# Patient Record
Sex: FEMALE | Employment: OTHER | URBAN - METROPOLITAN AREA
[De-identification: names, ages, dates, MRNs, and addresses within clinical notes are randomized per-mention and may not be internally consistent; named-entity substitution may affect disease eponyms.]

---

## 2021-03-19 ENCOUNTER — OFFICE VISIT (OUTPATIENT)
Dept: FAMILY MEDICINE CLINIC | Age: 51
End: 2021-03-19
Payer: COMMERCIAL

## 2021-03-19 ENCOUNTER — HOSPITAL ENCOUNTER (OUTPATIENT)
Age: 51
Setting detail: SPECIMEN
Discharge: HOME | End: 2021-03-19
Payer: COMMERCIAL

## 2021-03-19 VITALS
WEIGHT: 248 LBS | BODY MASS INDEX: 39.86 KG/M2 | SYSTOLIC BLOOD PRESSURE: 100 MMHG | HEART RATE: 81 BPM | OXYGEN SATURATION: 98 % | TEMPERATURE: 97 F | HEIGHT: 66 IN | DIASTOLIC BLOOD PRESSURE: 70 MMHG

## 2021-03-19 DIAGNOSIS — R20.9 BILATERAL COLD FEET: ICD-10-CM

## 2021-03-19 DIAGNOSIS — Z00.00 WELL ADULT EXAM: Primary | ICD-10-CM

## 2021-03-19 DIAGNOSIS — Z76.0 MEDICATION REFILL: ICD-10-CM

## 2021-03-19 DIAGNOSIS — Z12.31 BREAST CANCER SCREENING BY MAMMOGRAM: ICD-10-CM

## 2021-03-19 DIAGNOSIS — Z12.11 COLON CANCER SCREENING: ICD-10-CM

## 2021-03-19 DIAGNOSIS — Z13.89 MULTIPHASIC SCREENING: ICD-10-CM

## 2021-03-19 DIAGNOSIS — E11.65 TYPE 2 DIABETES MELLITUS WITH HYPERGLYCEMIA, WITH LONG-TERM CURRENT USE OF INSULIN (HCC): ICD-10-CM

## 2021-03-19 DIAGNOSIS — E78.5 DYSLIPIDEMIA: ICD-10-CM

## 2021-03-19 DIAGNOSIS — Z79.4 TYPE 2 DIABETES MELLITUS WITH HYPERGLYCEMIA, WITH LONG-TERM CURRENT USE OF INSULIN (HCC): ICD-10-CM

## 2021-03-19 DIAGNOSIS — Z71.3 DIETARY COUNSELING: ICD-10-CM

## 2021-03-19 DIAGNOSIS — Z71.82 EXERCISE COUNSELING: ICD-10-CM

## 2021-03-19 DIAGNOSIS — I10 HTN, GOAL BELOW 130/80: ICD-10-CM

## 2021-03-19 PROBLEM — H59.021: Status: ACTIVE | Noted: 2019-07-31

## 2021-03-19 PROBLEM — J30.9 ALLERGIC RHINITIS: Status: ACTIVE | Noted: 2019-07-31

## 2021-03-19 PROBLEM — E11.42 POLYNEUROPATHY DUE TO TYPE 2 DIABETES MELLITUS (HCC): Status: ACTIVE | Noted: 2020-09-21

## 2021-03-19 PROBLEM — G50.0 TRIGEMINAL NEURALGIA: Status: ACTIVE | Noted: 2019-07-31

## 2021-03-19 PROBLEM — E11.9 DIABETES MELLITUS, TYPE 2 (HCC): Status: ACTIVE | Noted: 2019-07-31

## 2021-03-19 PROBLEM — E66.01 MORBID (SEVERE) OBESITY DUE TO EXCESS CALORIES (HCC): Status: ACTIVE | Noted: 2020-09-22

## 2021-03-19 PROBLEM — M50.30 DDD (DEGENERATIVE DISC DISEASE), CERVICAL: Status: ACTIVE | Noted: 2019-07-31

## 2021-03-19 PROBLEM — F32.9 MAJOR DEPRESSIVE DISORDER: Status: ACTIVE | Noted: 2020-09-22

## 2021-03-19 PROBLEM — F41.9 ANXIETY: Status: ACTIVE | Noted: 2019-07-31

## 2021-03-19 LAB
ABSOLUTE EOS #: 0.23 K/UL (ref 0–0.44)
ABSOLUTE IMMATURE GRANULOCYTE: 0.17 K/UL (ref 0–0.3)
ABSOLUTE LYMPH #: 3.05 K/UL (ref 1.1–3.7)
ABSOLUTE MONO #: 0.79 K/UL (ref 0.1–1.2)
ALBUMIN SERPL-MCNC: 4.4 G/DL (ref 3.5–5.2)
ALT SERPL-CCNC: 31 U/L (ref 5–33)
AMPHETAMINE SCREEN URINE: NEGATIVE
ANION GAP SERPL CALCULATED.3IONS-SCNC: 16 MMOL/L (ref 9–17)
AST SERPL-CCNC: 29 U/L
BARBITURATE SCREEN URINE: NEGATIVE
BASOPHILS # BLD: 1 % (ref 0–2)
BASOPHILS ABSOLUTE: 0.06 K/UL (ref 0–0.2)
BENZODIAZEPINE SCREEN, URINE: NEGATIVE
BUN BLDV-MCNC: 19 MG/DL (ref 6–20)
BUN/CREAT BLD: ABNORMAL (ref 9–20)
BUPRENORPHINE URINE: ABNORMAL
CALCIUM SERPL-MCNC: 9.3 MG/DL (ref 8.6–10.4)
CANNABINOID SCREEN URINE: POSITIVE
CHLORIDE BLD-SCNC: 101 MMOL/L (ref 98–107)
CHOLESTEROL/HDL RATIO: 7.1
CHOLESTEROL: 293 MG/DL
CO2: 22 MMOL/L (ref 20–31)
COCAINE METABOLITE, URINE: NEGATIVE
CREAT SERPL-MCNC: 0.66 MG/DL (ref 0.5–0.9)
CREATININE URINE: 112.9 MG/DL (ref 28–217)
DIFFERENTIAL TYPE: ABNORMAL
EOSINOPHILS RELATIVE PERCENT: 2 % (ref 1–4)
GFR AFRICAN AMERICAN: >60 ML/MIN
GFR NON-AFRICAN AMERICAN: >60 ML/MIN
GFR SERPL CREATININE-BSD FRML MDRD: ABNORMAL ML/MIN/{1.73_M2}
GFR SERPL CREATININE-BSD FRML MDRD: ABNORMAL ML/MIN/{1.73_M2}
GLUCOSE BLD-MCNC: 142 MG/DL (ref 70–99)
HCT VFR BLD CALC: 44.3 % (ref 36.3–47.1)
HDLC SERPL-MCNC: 41 MG/DL
HEMOGLOBIN: 13.8 G/DL (ref 11.9–15.1)
HEPATITIS C ANTIBODY: NONREACTIVE
HIV AG/AB: NONREACTIVE
IMMATURE GRANULOCYTES: 2 %
LDL CHOLESTEROL DIRECT: 197 MG/DL
LDL CHOLESTEROL: ABNORMAL MG/DL (ref 0–130)
LYMPHOCYTES # BLD: 28 % (ref 24–43)
MAGNESIUM: 2.2 MG/DL (ref 1.6–2.6)
MCH RBC QN AUTO: 29.9 PG (ref 25.2–33.5)
MCHC RBC AUTO-ENTMCNC: 31.2 G/DL (ref 28.4–34.8)
MCV RBC AUTO: 96.1 FL (ref 82.6–102.9)
MDMA URINE: ABNORMAL
METHADONE SCREEN, URINE: NEGATIVE
METHAMPHETAMINE, URINE: ABNORMAL
MICROALBUMIN/CREAT 24H UR: <12 MG/L
MICROALBUMIN/CREAT UR-RTO: NORMAL MCG/MG CREAT
MONOCYTES # BLD: 7 % (ref 3–12)
NRBC AUTOMATED: 0 PER 100 WBC
OPIATES, URINE: NEGATIVE
OXYCODONE SCREEN URINE: NEGATIVE
PDW BLD-RTO: 12.9 % (ref 11.8–14.4)
PHENCYCLIDINE, URINE: NEGATIVE
PHOSPHORUS: 3.1 MG/DL (ref 2.6–4.5)
PLATELET # BLD: 362 K/UL (ref 138–453)
PLATELET ESTIMATE: ABNORMAL
PMV BLD AUTO: 11.3 FL (ref 8.1–13.5)
POTASSIUM SERPL-SCNC: 4.5 MMOL/L (ref 3.7–5.3)
PROPOXYPHENE, URINE: ABNORMAL
RBC # BLD: 4.61 M/UL (ref 3.95–5.11)
RBC # BLD: ABNORMAL 10*6/UL
SEG NEUTROPHILS: 60 % (ref 36–65)
SEGMENTED NEUTROPHILS ABSOLUTE COUNT: 6.81 K/UL (ref 1.5–8.1)
SODIUM BLD-SCNC: 139 MMOL/L (ref 135–144)
TEST INFORMATION: ABNORMAL
TRICYCLIC ANTIDEPRESSANTS, UR: ABNORMAL
TRIGL SERPL-MCNC: 487 MG/DL
TSH SERPL DL<=0.05 MIU/L-ACNC: 1.18 MIU/L (ref 0.3–5)
VLDLC SERPL CALC-MCNC: ABNORMAL MG/DL (ref 1–30)
WBC # BLD: 11.1 K/UL (ref 3.5–11.3)
WBC # BLD: ABNORMAL 10*3/UL

## 2021-03-19 PROCEDURE — G8482 FLU IMMUNIZE ORDER/ADMIN: HCPCS | Performed by: FAMILY MEDICINE

## 2021-03-19 PROCEDURE — 99386 PREV VISIT NEW AGE 40-64: CPT | Performed by: FAMILY MEDICINE

## 2021-03-19 PROCEDURE — G8417 CALC BMI ABV UP PARAM F/U: HCPCS | Performed by: FAMILY MEDICINE

## 2021-03-19 PROCEDURE — 99204 OFFICE O/P NEW MOD 45 MIN: CPT | Performed by: FAMILY MEDICINE

## 2021-03-19 RX ORDER — LISINOPRIL 20 MG/1
20 TABLET ORAL DAILY
Qty: 90 TABLET | Refills: 0 | Status: SHIPPED | OUTPATIENT
Start: 2021-03-19 | End: 2021-07-02 | Stop reason: SDUPTHER

## 2021-03-19 RX ORDER — SEMAGLUTIDE 1.34 MG/ML
INJECTION, SOLUTION SUBCUTANEOUS
Qty: 2 PEN | Refills: 0 | COMMUNITY
Start: 2021-03-19 | End: 2021-07-15 | Stop reason: SDUPTHER

## 2021-03-19 RX ORDER — GABAPENTIN 300 MG/1
300 CAPSULE ORAL 3 TIMES DAILY
COMMUNITY
Start: 2020-08-31 | End: 2021-03-19 | Stop reason: SDUPTHER

## 2021-03-19 RX ORDER — GLIMEPIRIDE 2 MG/1
2 TABLET ORAL
COMMUNITY
Start: 2021-01-19 | End: 2021-03-19 | Stop reason: SDUPTHER

## 2021-03-19 RX ORDER — DICYCLOMINE HCL 20 MG
20 TABLET ORAL 4 TIMES DAILY
Qty: 360 TABLET | Refills: 0 | Status: SHIPPED | OUTPATIENT
Start: 2021-03-19 | End: 2022-05-16

## 2021-03-19 RX ORDER — DICYCLOMINE HCL 20 MG
20 TABLET ORAL 4 TIMES DAILY
COMMUNITY
Start: 2021-01-19 | End: 2021-03-19 | Stop reason: SDUPTHER

## 2021-03-19 RX ORDER — GABAPENTIN 300 MG/1
300 CAPSULE ORAL 3 TIMES DAILY
Qty: 270 CAPSULE | Refills: 0 | Status: SHIPPED | OUTPATIENT
Start: 2021-03-19 | End: 2021-09-01 | Stop reason: SDUPTHER

## 2021-03-19 RX ORDER — BUSPIRONE HYDROCHLORIDE 30 MG/1
30 TABLET ORAL 3 TIMES DAILY
COMMUNITY
Start: 2021-01-19 | End: 2021-03-19 | Stop reason: SDUPTHER

## 2021-03-19 RX ORDER — SIMVASTATIN 20 MG
20 TABLET ORAL NIGHTLY
Qty: 90 TABLET | Refills: 0 | Status: SHIPPED | OUTPATIENT
Start: 2021-03-19 | End: 2021-03-21

## 2021-03-19 RX ORDER — BUSPIRONE HYDROCHLORIDE 30 MG/1
30 TABLET ORAL 3 TIMES DAILY
Qty: 270 TABLET | Refills: 0 | Status: SHIPPED | OUTPATIENT
Start: 2021-03-19 | End: 2021-10-07

## 2021-03-19 RX ORDER — ASPIRIN 81 MG/1
81 TABLET, CHEWABLE ORAL DAILY
COMMUNITY

## 2021-03-19 RX ORDER — SIMVASTATIN 20 MG
20 TABLET ORAL
COMMUNITY
Start: 2020-05-04 | End: 2021-03-19 | Stop reason: SDUPTHER

## 2021-03-19 RX ORDER — LISINOPRIL 10 MG/1
20 TABLET ORAL 2 TIMES DAILY
COMMUNITY
Start: 2021-01-19 | End: 2021-03-19

## 2021-03-19 RX ORDER — INSULIN GLARGINE 100 [IU]/ML
30 INJECTION, SOLUTION SUBCUTANEOUS EVERY EVENING
COMMUNITY
Start: 2020-08-31 | End: 2021-09-23 | Stop reason: SDUPTHER

## 2021-03-19 RX ORDER — GLIMEPIRIDE 2 MG/1
2 TABLET ORAL
Qty: 90 TABLET | Refills: 0 | Status: SHIPPED | OUTPATIENT
Start: 2021-03-19 | End: 2021-07-02 | Stop reason: SDUPTHER

## 2021-03-19 SDOH — ECONOMIC STABILITY: TRANSPORTATION INSECURITY
IN THE PAST 12 MONTHS, HAS LACK OF TRANSPORTATION KEPT YOU FROM MEETINGS, WORK, OR FROM GETTING THINGS NEEDED FOR DAILY LIVING?: NO

## 2021-03-19 SDOH — ECONOMIC STABILITY: INCOME INSECURITY: HOW HARD IS IT FOR YOU TO PAY FOR THE VERY BASICS LIKE FOOD, HOUSING, MEDICAL CARE, AND HEATING?: NOT HARD AT ALL

## 2021-03-19 SDOH — HEALTH STABILITY: MENTAL HEALTH: HOW MANY STANDARD DRINKS CONTAINING ALCOHOL DO YOU HAVE ON A TYPICAL DAY?: NOT ASKED

## 2021-03-19 SDOH — HEALTH STABILITY: MENTAL HEALTH: HOW OFTEN DO YOU HAVE A DRINK CONTAINING ALCOHOL?: NEVER

## 2021-03-19 SDOH — ECONOMIC STABILITY: FOOD INSECURITY: WITHIN THE PAST 12 MONTHS, THE FOOD YOU BOUGHT JUST DIDN'T LAST AND YOU DIDN'T HAVE MONEY TO GET MORE.: NEVER TRUE

## 2021-03-19 ASSESSMENT — PATIENT HEALTH QUESTIONNAIRE - PHQ9
SUM OF ALL RESPONSES TO PHQ9 QUESTIONS 1 & 2: 2
1. LITTLE INTEREST OR PLEASURE IN DOING THINGS: 1
2. FEELING DOWN, DEPRESSED OR HOPELESS: 1

## 2021-03-19 NOTE — PROGRESS NOTES
Progress Note    Leticia Howe is a 45 y.o.  female who presents today alone for evaluation of   Chief Complaint   Patient presents with    [facility]     saw Yvette Naylor in 10/2020           HPI:   Patient is here to [facility] care today. Patient PMH DM type 2 IDDM, HTN, dyslipidemia, h/o DVT, DDD, OA, obesity, polyneuropathy 2/2 DM, GERD, mood disorder, and seasonal allergies. Patient [address] [place] GB removal and cataract removal. Patient fhx dad DM and mom COPD/PAD. Patient is a former tobacco smoker. Patient denies regular EtOH consumption. Patient denies illicits. Patient denies SIG E CAPS. Patient denies SI/HI. Patient denies anxiety. Patient admits to a low carb diet. Patient states she does walk and ride a stationary bike daily. Patient is due for mammogram. Patient is due for colon cancer screening. Patient states she had a PAP about 18 months ago and was told it was normal.    PHQ-9 Total Score: 2 (3/19/2021 10:11 AM)    Patient last HbA1c about 8.0 8/2020. Patient -250. Patient states she takes 30 units of meal time basalglar daily and 10 units of lispro with meals. Patient states she has gained about 40 lbs. Patient states this has been since 2019. Patient states she does have n/t in her feet. Patient denies polyuria/polyphagia/polydipsia. Patient states she does have exertional calf cramping. Patient states she does have cold feet frequently. Patient states she has been told she has a history of PAD. Patient denies cp/sob/le edema/dizziness/lightheadedness/blurry va/ha. Patient denies PND/orthopnea.     Health Maintenance Due   Topic Date Due    Potassium monitoring  Never done    Creatinine monitoring  Never done    Hepatitis C screen  Never done    Lipid screen  Never done    HIV screen  Never done    Cervical cancer screen  Never done    Diabetes screen  Never done    Breast cancer screen  Never done    Shingles Vaccine (1 of 2) Never done    Colon cancer screen colonoscopy Never done        Current Medications:     Current Outpatient Medications   Medication Sig Dispense Refill    aspirin 81 MG chewable tablet Take 81 mg by mouth daily      insulin glargine (BASAGLAR KWIKPEN) 100 UNIT/ML injection pen Inject 30 Units into the skin every evening      insulin lispro (HUMALOG) 100 UNIT/ML injection vial Inject 10 Units into the skin 3 times daily (before meals)      glimepiride (AMARYL) 2 MG tablet Take 1 tablet by mouth daily (with breakfast) 90 tablet 0    simvastatin (ZOCOR) 20 MG tablet Take 1 tablet by mouth nightly 90 tablet 0    lisinopril (PRINIVIL;ZESTRIL) 20 MG tablet Take 1 tablet by mouth daily 90 tablet 0    dicyclomine (BENTYL) 20 MG tablet Take 1 tablet by mouth 4 times daily 360 tablet 0    busPIRone (BUSPAR) 30 MG tablet Take 30 mg by mouth 3 times daily 270 tablet 0    gabapentin (NEURONTIN) 300 MG capsule Take 1 capsule by mouth 3 times daily for 90 days. 270 capsule 0    Semaglutide,0.25 or 0.5MG/DOS, (OZEMPIC, 0.25 OR 0.5 MG/DOSE,) 2 MG/1.5ML SOPN Inject 0.25 mg weekly 2 pen 0     No current facility-administered medications for this visit. Allergies: Allergies   Allergen Reactions    Latex Hives and Rash     After prolonged contact    After prolonged contact      Amoxicillin Hives    Atorvastatin Other (See Comments)     Stomach pain  Other reaction(s):  Other (See Comments), Other (See Comments)  Stomach pain  Stomach pain      Pineapple Itching        Medical History:     Past Medical History:   Diagnosis Date    Chronic deep vein thrombosis (DVT) of proximal vein of left lower extremity ([address]     DDD (degenerative disc disease), thoracolumbar     Dyslipidemia     Essential hypertension     Gastroesophageal reflux disease without esophagitis     Mood disorder (HCC)     Neuropathy     Trigeminal neuralgia     Type 2 diabetes mellitus with hyperglycemia, with long-term current use of insulin ([address]        Past Surgical History: Procedure Laterality Date    CATARACT REMOVAL WITH IMPLANT      CHOLECYSTECTOMY         Family History   Problem Relation Age of Onset    COPD Mother     Other Mother         PAD    Diabetes type 2  Father         Social History:     Social History     Socioeconomic History    Marital status:      Spouse name: Not on file    Number of children: Not on file    Years of education: Not on file    Highest education level: Not on file   Occupational History    Not on file   Social Needs    Financial resource strain: Not hard at all   Jose-Rosa insecurity     Worry: Never true     Inability: Never true   [employer] needs     Medical: No     Non-medical: No   Tobacco Use    Smoking status: Former Smoker     Quit date: 2019     Years since quittin.5    Smokeless tobacco: Never Used    Tobacco comment: estimated quit date   Substance and Sexual Activity    Alcohol use: Never     Frequency: Never     Binge frequency: Never    Drug use: Never    Sexual activity: Not on file   Lifestyle    Physical activity     Days per week: Not on file     Minutes per session: Not on file    Stress: Not on file   Relationships    Social connections     Talks on phone: Not on file     Gets together: Not on file     Attends Latter day service: Not on file     Active member of club or organization: Not on file     Attends meetings of clubs or organizations: Not on file     Relationship status: Not on file    Intimate partner violence     Fear of current or ex partner: Not on file     Emotionally abused: Not on file     Physically abused: Not on file     Forced sexual activity: Not on file   Other Topics Concern    Not on file   Social History Narrative    Not on file        ROS:     Constitutional: No fevers, chills, fatigue. ENT: No nasal congestion or sore throat  Respiratory: No difficulty in breathing or cough.    Cardiovascular: No chest pain, palpitations or shortness of breath  Gastrointestinal: No abdominal pain or change in bowel movements. Genitourinary: No change in urinary frequency or dysuria. Skin: No rashes or skin lesions. Neurological: No weakness. No headaches. MSK: +cold feet         Last Filed Vitals:  /70   Pulse 81   Temp 97 °F (36.1 °C) (Temporal)   Ht 5' 6\" (1.676 m)   Wt 248 lb (112.5 kg)   SpO2 98%   BMI 40.03 kg/m²      Physical Examination:     GENERAL APPEARANCE: in no acute distress, well developed, well nourished. HEAD: normocephalic, atraumatic. EYES: extraocular movement intact (EOMI), pupils equal, round, reactive to light and accommodation. EARS: normal, tympanic membrane intact, clear, auditory canal clear. NOSE: nares patent, no erythema, sinuses nontender bilaterally, no rhinorrhea. ORAL CAVITY: mucosa moist, no lesions. THROAT: clear, no mass, no exudate. NECK/THYROID: neck supple, full range of motion, no thyromegaly. HEART: no murmurs, regular rate and rhythm, S1, S2 normal.   LUNGS: clear to auscultation bilaterally, no wheezes, rales, rhonchi. ABDOMEN: normal, bowel sounds present, soft, nontender, nondistended, no rebound guarding or rigidity    Recent Labs/ In Office Testing/ Radiograph review:     No results found for any previous visit. No results found for this visit on 03/19/21. Assessment/Plan:     Najma Bennett was seen today for establish care. Diagnoses and all orders for this visit:    Well adult exam    BMI 40.0-44.9, adult [facility])    Exercise counseling    Dietary counseling  -      BMI ABOVE NORMAL F/U    Multiphasic screening  -     Hepatitis C Antibody; Future  -     HIV Screen; Future    Dyslipidemia  -     ALT; Future  -     AST; Future  -     CBC Auto Differential; Future  -     Lipid Panel; Future  -     TSH with Reflex; Future    HTN, goal below 130/80  -     Magnesium; Future  -     Renal Function Panel;  Future    Type 2 diabetes mellitus with hyperglycemia, with long-term current use of insulin ([facility]  -     Hemoglobin A1C; Future  -     Microalbumin, Ur; Future  -     Semaglutide,0.25 or 0.5MG/DOS, (OZEMPIC, 0.25 OR 0.5 MG/DOSE,) 2 MG/1.5ML SOPN; Inject 0.25 mg weekly    Breast cancer screening by mammogram  -     [facility] DIGITAL SCREEN W OR WO CAD BILATERAL; Future    Colon cancer screening  -     Cologuard (For External Results Only); Future    Medication refill  -     Urine Drug Screen; Future  -     glimepiride (AMARYL) 2 MG tablet; Take 1 tablet by mouth daily (with breakfast)  -     simvastatin (ZOCOR) 20 MG tablet; Take 1 tablet by mouth nightly  -     lisinopril (PRINIVIL;ZESTRIL) 20 MG tablet; Take 1 tablet by mouth daily  -     dicyclomine (BENTYL) 20 MG tablet; Take 1 tablet by mouth 4 times daily  -     busPIRone (BUSPAR) 30 MG tablet; Take 30 mg by mouth 3 times daily  -     gabapentin (NEURONTIN) 300 MG capsule; Take 1 capsule by mouth 3 times daily for 90 days. Bilateral cold feet  -     VL DUP LOWER EXTREMITY ARTERIES BILATERAL; Future    Follow up on labs and imaging. Refills as above. Start Ozempic for tighter glycemic control. OARRS reviewed. UDS ordered. CSA signed. Encouraged well balanced low carb diet. Encouraged 150 mins of aerobic activity weekly. All questions answered and addressed to patient satisfaction. Patient understands and agrees to the plan. The patient was evaluated and treated today based on the osteopathic principle that each person is a unit of body, mind, and spirit, the body is capable of self-regulation, self-healing, and health maintenance and that structure and function are reciprocally interrelated. Follow-up:   Return in about 3 months (around 6/19/2021) for dm/htn/chol; 20 min appt. Ching Stein D.O.    BMI was elevated today, and weight loss plan recommended is : conventional weight loss.

## 2021-03-21 DIAGNOSIS — E78.5 DYSLIPIDEMIA: Primary | ICD-10-CM

## 2021-03-21 LAB
ESTIMATED AVERAGE GLUCOSE: 197 MG/DL
HBA1C MFR BLD: 8.5 % (ref 4–6)

## 2021-03-21 RX ORDER — PRAVASTATIN SODIUM 40 MG
40 TABLET ORAL DAILY
Qty: 90 TABLET | Refills: 1 | Status: SHIPPED | OUTPATIENT
Start: 2021-03-21 | End: 2021-09-23

## 2021-04-12 ENCOUNTER — TELEPHONE (OUTPATIENT)
Dept: FAMILY MEDICINE CLINIC | Age: 51
End: 2021-04-12

## 2021-04-12 NOTE — TELEPHONE ENCOUNTER
Excellent thank you. Please ask her to try it for anoither two injections if she is agreeable.  almax

## 2021-04-12 NOTE — TELEPHONE ENCOUNTER
Pt has taken 3 doses of Ozempic and is due for the next one today  She had 4 days last week where she was constantly in the bathroom with diarrhea and abdominal pain. It was 2 days after she took a dose. Should she take dose today?

## 2021-06-22 ENCOUNTER — OFFICE VISIT (OUTPATIENT)
Dept: FAMILY MEDICINE CLINIC | Age: 51
End: 2021-06-22
Payer: COMMERCIAL

## 2021-06-22 VITALS
DIASTOLIC BLOOD PRESSURE: 70 MMHG | HEART RATE: 84 BPM | BODY MASS INDEX: 38.58 KG/M2 | WEIGHT: 239 LBS | SYSTOLIC BLOOD PRESSURE: 112 MMHG | TEMPERATURE: 97.4 F | OXYGEN SATURATION: 96 %

## 2021-06-22 DIAGNOSIS — Z79.4 TYPE 2 DIABETES MELLITUS WITH HYPERGLYCEMIA, WITH LONG-TERM CURRENT USE OF INSULIN (HCC): Primary | ICD-10-CM

## 2021-06-22 DIAGNOSIS — E78.5 DYSLIPIDEMIA: ICD-10-CM

## 2021-06-22 DIAGNOSIS — E11.65 TYPE 2 DIABETES MELLITUS WITH HYPERGLYCEMIA, WITH LONG-TERM CURRENT USE OF INSULIN (HCC): Primary | ICD-10-CM

## 2021-06-22 DIAGNOSIS — Z71.3 DIETARY COUNSELING: ICD-10-CM

## 2021-06-22 DIAGNOSIS — I10 HTN, GOAL BELOW 130/80: ICD-10-CM

## 2021-06-22 PROCEDURE — 1036F TOBACCO NON-USER: CPT | Performed by: FAMILY MEDICINE

## 2021-06-22 PROCEDURE — G8417 CALC BMI ABV UP PARAM F/U: HCPCS | Performed by: FAMILY MEDICINE

## 2021-06-22 PROCEDURE — 3017F COLORECTAL CA SCREEN DOC REV: CPT | Performed by: FAMILY MEDICINE

## 2021-06-22 PROCEDURE — G8427 DOCREV CUR MEDS BY ELIG CLIN: HCPCS | Performed by: FAMILY MEDICINE

## 2021-06-22 PROCEDURE — 2022F DILAT RTA XM EVC RTNOPTHY: CPT | Performed by: FAMILY MEDICINE

## 2021-06-22 PROCEDURE — 3052F HG A1C>EQUAL 8.0%<EQUAL 9.0%: CPT | Performed by: FAMILY MEDICINE

## 2021-06-22 PROCEDURE — 99214 OFFICE O/P EST MOD 30 MIN: CPT | Performed by: FAMILY MEDICINE

## 2021-06-22 ASSESSMENT — PATIENT HEALTH QUESTIONNAIRE - PHQ9
SUM OF ALL RESPONSES TO PHQ QUESTIONS 1-9: 0
SUM OF ALL RESPONSES TO PHQ9 QUESTIONS 1 & 2: 0
1. LITTLE INTEREST OR PLEASURE IN DOING THINGS: 0
SUM OF ALL RESPONSES TO PHQ QUESTIONS 1-9: 0
2. FEELING DOWN, DEPRESSED OR HOPELESS: 0
SUM OF ALL RESPONSES TO PHQ QUESTIONS 1-9: 0

## 2021-06-22 NOTE — PATIENT INSTRUCTIONS
Learning About Obesity  What is obesity? Obesity means having an unhealthy amount of body fat. This puts your health in danger. It can lead to other health problems, such as type 2 diabetes and high blood pressure. How do you know if your weight is in the obesity range? To know if your weight is in the obesity range, your doctor looks at your body mass index (BMI) and waist size. BMI is a number that is calculated from your weight and your height. To figure out your BMI for yourself, you can use an online tool, such as http://www.dow.com/ on the [employer]. If your BMI is 30.0 or higher, it falls within the obesity range. Keep in mind that BMI and waist size are only guides. They are not tools to determine your ideal body weight. What causes obesity? When you take in more calories than you burn off, you gain weight. How you eat, how active you are, and other things affect how your body uses calories and whether you gain weight. If you have family members who have too much body fat, you may have inherited a tendency to gain weight. And your family also helps form your eating and lifestyle habits, which can lead to obesity. Also, our busy lives make it harder to plan and cook healthy meals. For many of us, it's easier to reach for prepared foods, go out to eat, or go to the drive-through. But these foods are often high in saturated fat and calories. Portions are often too large. What can you do to reach a healthy weight? Focus on health, not diets. Diets are hard to stay on and don't work in the long run. It is very hard to stay with a diet that includes lots of big changes in your eating habits. Instead of a diet, focus on lifestyle changes that will improve your health and achieve the right balance of energy and calories. To lose weight, you need to burn more calories than you take in.  You can do it by eating healthy foods in reasonable amounts and becoming more active, even a little bit every day. Making small changes over time can add up to a lot. Make a plan for change. Many people have found that naming their reasons for change and staying focused on their plan can make a big difference. Work with your doctor to create a plan that is right for you. · Ask yourself: Thanh Jordan are my personal, most powerful reasons for wanting this change? What will my life look like when I've made the change? \"  · Set your long-term goal. Make it specific, such as \"I will lose x pounds. \"  · Break your long-term goal into smaller, short-term goals. Make these small steps specific and within your reach, things you know you can do. These steps are what keep you going from day to day. Talk with your doctor about other weight-loss options. If you have a BMI in a certain range and have not been able to lose weight with diet and exercise, medicine or surgery may be an option for you. Before your doctor will prescribe medicines or surgery, he or she will probably want you to be more active and follow your healthy eating plan for a period of time. These habits are key lifelong changes for managing your weight, with or without other medical treatment. And these changes can help you avoid weight-related health problems. How can you stay on your plan for change? Be ready. Choose to start during a time when there are few events like holidays, social events, and high-stress periods. These events might trigger slip-ups. Decide on your first few steps. Most people have more success when they make small changes, one step at a time. For example, you might switch a daily candy bar to a piece of fruit, walk 10 minutes more, or add more vegetables to a meal.  Line up your support people. Make sure you're not going to be alone as you make this change. Connect with people who understand how important it is to you.  Ask family members and friends for help in keeping It's also a good idea to know your test results and keep a list of the medicines you take. Where can you learn more? Go to https://[facility]iceweb.[employer]. org and sign in to your [employer] account. Enter N111 in the [facility] box to learn more about \"Learning About Obesity. \"     If you do not have an account, please click on the \"Sign Up Now\" link. Current as of: March 17, 2021               Content Version: 12.9  © 9132-2168 Healthwise, [employer]. Care instructions adapted under license by [facility] ([facility]). If you have questions about a medical condition or this instruction, always ask your healthcare professional. [address] any warranty or liability for your use of this information.

## 2021-06-22 NOTE — PROGRESS NOTES
Progress Note    Filippo Gale is a 45 y.o.  female who presents today alone for evaluation of   Chief Complaint   Patient presents with    Diabetes    Hypertension    Hyperlipidemia           HPI:   Patient is here for follow up on DM/HTN/dyslipidemia. Patient last HbA1c about 8.5 3/2021. Patient -140. Patient states she takes 30 units of meal time basalglar daily and 10 units of lispro with meals. Patient has lost about 10 lbs since her last visit since starting her ozempic. Patient states she does have n/t in her feet. Patient denies polyuria/polyphagia/polydipsia. Patient states she does have exertional calf cramping. Patient denies cp/sob/le edema/dizziness/lightheadedness/blurry va/ha. Patient denies PND/orthopnea. Health Maintenance Due   Topic Date Due    Diabetic foot exam  Never done    Diabetic retinal exam  Never done    Hepatitis B vaccine (1 of 3 - Risk 3-dose series) Never done    Cervical cancer screen  Never done    Breast cancer screen  Never done    Shingles Vaccine (1 of 2) Never done    Colon cancer screen colonoscopy  Never done        Current Medications:     Current Outpatient Medications   Medication Sig Dispense Refill    pravastatin (PRAVACHOL) 40 MG tablet Take 1 tablet by mouth daily 90 tablet 1    aspirin 81 MG chewable tablet Take 81 mg by mouth daily      insulin glargine (BASAGLAR KWIKPEN) 100 UNIT/ML injection pen Inject 30 Units into the skin every evening      insulin lispro (HUMALOG) 100 UNIT/ML injection vial Inject 10 Units into the skin 3 times daily (before meals)      glimepiride (AMARYL) 2 MG tablet Take 1 tablet by mouth daily (with breakfast) 90 tablet 0    lisinopril (PRINIVIL;ZESTRIL) 20 MG tablet Take 1 tablet by mouth daily 90 tablet 0    dicyclomine (BENTYL) 20 MG tablet Take 1 tablet by mouth 4 times daily 360 tablet 0    gabapentin (NEURONTIN) 300 MG capsule Take 1 capsule by mouth 3 times daily for 90 days.  270 capsule 0  Semaglutide,0.25 or 0.5MG/DOS, (OZEMPIC, 0.25 OR 0.5 MG/DOSE,) 2 MG/1.5ML SOPN Inject 0.25 mg weekly (Patient taking differently: 0.5 mg Inject 0.25 mg weekly) 2 pen 0     No current facility-administered medications for this visit. Allergies: Allergies   Allergen Reactions    Latex Hives and Rash     After prolonged contact    After prolonged contact      Amoxicillin Hives    Atorvastatin Other (See Comments)     Stomach pain  Other reaction(s):  Other (See Comments), Other (See Comments)  Stomach pain  Stomach pain      Pineapple Itching        Medical History:     Past Medical History:   Diagnosis Date    Chronic deep vein thrombosis (DVT) of proximal vein of left lower extremity ([address]     DDD (degenerative disc disease), thoracolumbar     Dyslipidemia     Essential hypertension     Gastroesophageal reflux disease without esophagitis     Mood disorder (HCC)     Neuropathy     Trigeminal neuralgia     Type 2 diabetes mellitus with hyperglycemia, with long-term current use of insulin (HCC)        Past Surgical History:   Procedure Laterality Date    CATARACT REMOVAL WITH IMPLANT      CHOLECYSTECTOMY         Family History   Problem Relation Age of Onset    COPD Mother     Other Mother         PAD    Diabetes type 2  Father         Social History:     Social History     Socioeconomic History    Marital status:      Spouse name: Not on file    Number of children: Not on file    Years of education: Not on file    Highest education level: Not on file   Occupational History    Not on file   Tobacco Use    Smoking status: Former Smoker     Quit date: 2019     Years since quittin.8    Smokeless tobacco: Never Used    Tobacco comment: estimated quit date   Substance and Sexual Activity    Alcohol use: Never    Drug use: Never    Sexual activity: Not on file   Other Topics Concern    Not on file   Social History Narrative    Not on file     Social Determinants of Health     Financial Resource Strain: Low Risk     Difficulty of Paying Living Expenses: Not hard at all   Food Insecurity: No Food Insecurity    Worried About Running Out of Food in the Last Year: Never true    [place] of Food in the Last Year: Never true   Transportation Needs: No Transportation Needs    Lack of Transportation (Medical): No    Lack of Transportation (Non-Medical): No   Physical Activity:     Days of Exercise per Week:     Minutes of Exercise per Session:    Stress:     Feeling of Stress :    Social Connections:     Frequency of Communication with Friends and Family:     Frequency of Social Gatherings with Friends and Family:     Attends Faith Services:     Active Member of Clubs or Organizations:     Attends Club or Organization Meetings:     Marital Status:    Intimate Partner Violence:     Fear of Current or Ex-Partner:     Emotionally Abused:     Physically Abused:     Sexually Abused:         ROS:     Constitutional: No fevers, chills, fatigue. ENT: No nasal congestion or sore throat  Respiratory: No difficulty in breathing or cough. Cardiovascular: No chest pain, palpitations or shortness of breath  Gastrointestinal: No abdominal pain or change in bowel movements. Genitourinary: No change in urinary frequency or dysuria. Skin: No rashes or skin lesions. Neurological: No weakness. No headaches. Last Filed Vitals:  /70   Pulse 84   Temp 97.4 °F (36.3 °C) (Temporal)   Wt 239 lb (108.4 kg)   SpO2 96%   BMI 38.58 kg/m²      Physical Examination:     GENERAL APPEARANCE: in no acute distress, well developed, well nourished. HEAD: normocephalic, atraumatic. EYES: extraocular movement intact (EOMI), pupils equal, round, reactive to light and accommodation. EARS: normal, tympanic membrane intact, clear, auditory canal clear. NOSE: nares patent, no erythema, sinuses nontender bilaterally, no rhinorrhea. ORAL CAVITY: mucosa moist, no lesions. REPORTED   Final    Hemoglobin A1C 03/19/2021 8.5* 4.0 - 6.0 % Final    Estimated Avg Glucose 03/19/2021 197  mg/dL Final    Comment: The ADA and AACC recommend providing the estimated average glucose result to permit better   patient understanding of their HBA1c result.  Hepatitis C Ab 03/19/2021 NONREACTIVE  NONREACTIVE Final    Comment:       The hepatitis C procedure used in our laboratory is a Chemiluminescent test specific for   three recombinant HCV antigens. A negative anti-HCV result indicates that the antibodies to   hepatitis C virus are not present at this time. Individuals with reactive anti-HCV should be considered infected and infectious until proven   otherwise. Confirmation of all equivocal or reactive results is recommended by ordering   HCV RNA by PCR.  HIV Ag/Ab 03/19/2021 NONREACTIVE  NONREACTIVE Final    Comment: No laboratory evidence of HIV infection. If acute HIV infection is suspected, consider   testing for HIV-1 RNA.  Cholesterol 03/19/2021 293* <200 mg/dL Final    Comment:    Cholesterol Guidelines:      <200  Desirable   200-240  Borderline      >240  Undesirable         HDL 03/19/2021 41  >40 mg/dL Final    Comment:    HDL Guidelines:    <40     Undesirable   40-59    Borderline    >59     Desirable         LDL Cholesterol 03/19/2021       0 - 130 mg/dL Final    Comment: Calculation not valid for Triglyceride value greater than 400 mg/dL. Direct LDL reflexed           LDL Guidelines:     <100    Desirable   100-129   Near to/above Desirable   130-159   Borderline      >159   Undesirable     Direct (measured) LDL and calculated LDL are not interchangeable tests.  Chol/HDL Ratio 03/19/2021 7.1* <5 Final            Triglycerides 03/19/2021 487* <150 mg/dL Final    Comment:    Triglyceride Guidelines:     <150   Desirable   150-199  Borderline   200-499  High     >499   Very high   Based on AHA Guidelines for fasting triglyceride, October 2012.          VLDL 03/19/2021 NOT REPORTED  1 - 30 mg/dL Final    Magnesium 03/19/2021 2.2  1.6 - 2.6 mg/dL Final    Microalb, Ur 03/19/2021 <12  <21 mg/L Final    Creatinine, Ur 03/19/2021 112.9  28.0 - 217.0 mg/dL Final    Microalb/Crt. Ratio 03/19/2021 CANNOT BE CALCULATED  <25 mcg/mg creat Final    Glucose 03/19/2021 142* 70 - 99 mg/dL Final    BUN 03/19/2021 19  6 - 20 mg/dL Final    CREATININE 03/19/2021 0.66  0.50 - 0.90 mg/dL Final    Bun/Cre Ratio 03/19/2021 NOT REPORTED  9 - 20 Final    Calcium 03/19/2021 9.3  8.6 - 10.4 mg/dL Final    Albumin 03/19/2021 4.4  3.5 - 5.2 g/dL Final    Phosphorus 03/19/2021 3.1  2.6 - 4.5 mg/dL Final    Sodium 03/19/2021 139  135 - 144 mmol/L Final    Potassium 03/19/2021 4.5  3.7 - 5.3 mmol/L Final    Chloride 03/19/2021 101  98 - 107 mmol/L Final    CO2 03/19/2021 22  20 - 31 mmol/L Final    Anion Gap 03/19/2021 16  9 - 17 mmol/L Final    GFR Non- 03/19/2021 >60  >60 mL/min Final    GFR  03/19/2021 >60  >60 mL/min Final    GFR Comment 03/19/2021        Final    Comment: Average GFR for 52-63 years old:   80 mL/min/1.73sq m  Chronic Kidney Disease:   <60 mL/min/1.73sq m  Kidney failure:   <15 mL/min/1.73sq m              eGFR calculated using average adult body mass.  Additional eGFR calculator available at:        b[employer]mobile[employer]            GFR Staging 03/19/2021 NOT REPORTED   Final    TSH 03/19/2021 1.18  0.30 - 5.00 mIU/L Final    Amphetamine Screen, Ur 03/19/2021 NEGATIVE  NEGATIVE Final    Comment:       (Positive cutoff 1000 ng/mL)                  Barbiturate Screen, Ur 03/19/2021 NEGATIVE  NEGATIVE Final    Comment:       (Positive cutoff 200 ng/mL)                  Benzodiazepine Screen, Urine 03/19/2021 NEGATIVE  NEGATIVE Final    Comment:       (Positive cutoff 200 ng/mL)                  Cocaine Metabolite, Urine 03/19/2021 NEGATIVE  NEGATIVE Final    Comment:       (Positive cutoff 300 Future  -     Renal Function Panel; Future    Dietary counseling  -      BMI ABOVE NORMAL F/U    Follow up on labs. Encouraged well balanced diet. Encouraged 150 mins of aerobic activity weekly. Continue current medical regimen. Congratulated her on her wt loss. All questions answered and addressed to patient satisfaction. Patient understands and agrees to the plan. The patient was evaluated and treated today based on the osteopathic principle that each person is a unit of body, mind, and spirit, the body is capable of self-regulation, self-healing, and health maintenance and that structure and function are reciprocally interrelated. Follow-up:   Return in about 3 months (around 9/22/2021) for dm; 20 min appt. Gabriela Middleton D.O.    BMI was elevated today, and weight loss plan recommended is : conventional weight loss.

## 2021-07-02 DIAGNOSIS — Z76.0 MEDICATION REFILL: ICD-10-CM

## 2021-07-02 RX ORDER — LISINOPRIL 20 MG/1
TABLET ORAL
Qty: 90 TABLET | Refills: 0 | Status: SHIPPED | OUTPATIENT
Start: 2021-07-02 | End: 2021-09-23

## 2021-07-02 RX ORDER — GLIMEPIRIDE 2 MG/1
2 TABLET ORAL
Qty: 90 TABLET | Refills: 0 | Status: SHIPPED | OUTPATIENT
Start: 2021-07-02 | End: 2022-01-17 | Stop reason: SDUPTHER

## 2021-07-02 RX ORDER — LISINOPRIL 20 MG/1
20 TABLET ORAL DAILY
Qty: 90 TABLET | Refills: 0 | Status: SHIPPED | OUTPATIENT
Start: 2021-07-02 | End: 2021-10-07

## 2021-07-02 RX ORDER — GLIMEPIRIDE 2 MG/1
TABLET ORAL
Qty: 90 TABLET | Refills: 0 | Status: SHIPPED | OUTPATIENT
Start: 2021-07-02 | End: 2021-10-07

## 2021-07-02 NOTE — TELEPHONE ENCOUNTER
Jigar Adhikari is calling to request a refill on the following medication(s):    Medication Request:  Requested Prescriptions     Pending Prescriptions Disp Refills    glimepiride (AMARYL) 2 MG tablet 90 tablet 0     Sig: Take 1 tablet by mouth daily (with breakfast)    lisinopril (PRINIVIL;ZESTRIL) 20 MG tablet 90 tablet 0     Sig: Take 1 tablet by mouth daily       Last Visit Date (If Applicable):  7/47/2202    Next Visit Date:    9/23/2021

## 2021-07-02 NOTE — TELEPHONE ENCOUNTER
Shruthi Stark is calling to request a refill on the following medication(s):    Medication Request:  Requested Prescriptions     Pending Prescriptions Disp Refills    glimepiride (AMARYL) 2 MG tablet [Pharmacy Med Name: GLIMEPIRIDE 2 MG TABLET] 90 tablet 0     Sig: TAKE ONE TABLET BY MOUTH DAILY ITH BREAKFAST    lisinopril (PRINIVIL;ZESTRIL) 20 MG tablet [Pharmacy Med Name: LISINOPRIL 20 MG TABLET] 90 tablet 0     Sig: TAKE ONE TABLET BY MOUTH DAILY       Last Visit Date (If Applicable):  5/27/0025    Next Visit Date:    7/2/2021

## 2021-07-15 DIAGNOSIS — Z79.4 TYPE 2 DIABETES MELLITUS WITH HYPERGLYCEMIA, WITH LONG-TERM CURRENT USE OF INSULIN (HCC): ICD-10-CM

## 2021-07-15 DIAGNOSIS — E11.65 TYPE 2 DIABETES MELLITUS WITH HYPERGLYCEMIA, WITH LONG-TERM CURRENT USE OF INSULIN (HCC): ICD-10-CM

## 2021-07-15 RX ORDER — SEMAGLUTIDE 1.34 MG/ML
INJECTION, SOLUTION SUBCUTANEOUS
Qty: 1 PEN | Refills: 0 | COMMUNITY
Start: 2021-07-15 | End: 2021-08-17 | Stop reason: SDUPTHER

## 2021-08-10 ENCOUNTER — TELEPHONE (OUTPATIENT)
Dept: FAMILY MEDICINE CLINIC | Age: 51
End: 2021-08-10

## 2021-08-10 NOTE — TELEPHONE ENCOUNTER
----- Message from Velvet Margaux sent at 8/10/2021 10:04 AM EDT -----  Subject: Message to Provider    QUESTIONS  Information for Provider? Patient doesn't have a fever, but her other   covid symptoms are back. Patient is going to go get tested again just to   make sure.  ---------------------------------------------------------------------------  --------------  [address]  What is the best way for the office to contact you? OK to leave message on   voicemail  Preferred Call Back Phone Number? [phone]  ---------------------------------------------------------------------------  --------------  SCRIPT ANSWERS  Relationship to Patient?  Self

## 2021-08-17 DIAGNOSIS — Z79.4 TYPE 2 DIABETES MELLITUS WITH HYPERGLYCEMIA, WITH LONG-TERM CURRENT USE OF INSULIN (HCC): ICD-10-CM

## 2021-08-17 DIAGNOSIS — E11.65 TYPE 2 DIABETES MELLITUS WITH HYPERGLYCEMIA, WITH LONG-TERM CURRENT USE OF INSULIN (HCC): ICD-10-CM

## 2021-08-17 RX ORDER — SEMAGLUTIDE 1.34 MG/ML
INJECTION, SOLUTION SUBCUTANEOUS
Qty: 1 PEN | Refills: 0 | Status: SHIPPED | OUTPATIENT
Start: 2021-08-17 | End: 2021-11-23

## 2021-08-17 NOTE — TELEPHONE ENCOUNTER
Pt would like a sample of ozempic and also a rx sent to the pharmacy to see if they will cover it for her.   I will pend          [facility] refill on the following medication(s):    Medication Request:  Requested Prescriptions     Pending Prescriptions Disp Refills    Semaglutide,0.25 or 0.5MG/DOS, (OZEMPIC, 0.25 OR 0.5 MG/DOSE,) 2 MG/1.5ML SOPN 1 pen 0     Sig: Inject 0.25 mg weekly       Last Visit Date (If Applicable):  2/39/5782    Next Visit Date:    9/23/2021

## 2021-08-19 ENCOUNTER — TELEPHONE (OUTPATIENT)
Dept: FAMILY MEDICINE CLINIC | Age: 51
End: 2021-08-19

## 2021-08-19 NOTE — TELEPHONE ENCOUNTER
No samples currently. I would recommend she call back next week and if she needs a PA we can complete it. Please contact her pharmacy.  thx.

## 2021-08-19 NOTE — TELEPHONE ENCOUNTER
----- Message from Steven Thakur sent at 8/19/2021 10:44 AM EDT -----  Subject: Message to Provider    QUESTIONS  Information for Provider? Patient is calling about her ozempic medication   her insurance company needs more information on this medication in order   for them to fill it. She is wondering if you have any samples available   until it is straightened out with her insurance company.  ---------------------------------------------------------------------------  --------------  [id number] [address]  What is the best way for the office to contact you? OK to leave message on   voicemail  Preferred Call Back Phone Number? [phone]  ---------------------------------------------------------------------------  --------------  SCRIPT ANSWERS  Relationship to Patient?  Self

## 2021-09-01 DIAGNOSIS — Z76.0 MEDICATION REFILL: ICD-10-CM

## 2021-09-01 RX ORDER — GABAPENTIN 300 MG/1
300 CAPSULE ORAL 3 TIMES DAILY
Qty: 270 CAPSULE | Refills: 0 | Status: SHIPPED | OUTPATIENT
Start: 2021-09-01 | End: 2022-01-06 | Stop reason: SDUPTHER

## 2021-09-01 NOTE — TELEPHONE ENCOUNTER
Yasemin Wylie is calling to request a refill on the following medication(s):    Medication Request:  Requested Prescriptions     Pending Prescriptions Disp Refills    gabapentin (NEURONTIN) 300 MG capsule 270 capsule 0     Sig: Take 1 capsule by mouth 3 times daily for 90 days.        Last Visit Date (If Applicable):  8/58/9337    Next Visit Date:    9/23/2021

## 2021-09-09 ENCOUNTER — TELEPHONE (OUTPATIENT)
Dept: FAMILY MEDICINE CLINIC | Age: 51
End: 2021-09-09

## 2021-09-09 NOTE — TELEPHONE ENCOUNTER
Nika Feng was contacted as part of mammography outreach. Provided patient with the phone number for [facility] scheduling.      Epifanio

## 2021-09-22 ENCOUNTER — HOSPITAL ENCOUNTER (OUTPATIENT)
Age: 51
Setting detail: SPECIMEN
Discharge: HOME | End: 2021-09-22
Payer: COMMERCIAL

## 2021-09-22 DIAGNOSIS — E78.5 DYSLIPIDEMIA: ICD-10-CM

## 2021-09-22 DIAGNOSIS — Z79.4 TYPE 2 DIABETES MELLITUS WITH HYPERGLYCEMIA, WITH LONG-TERM CURRENT USE OF INSULIN (HCC): ICD-10-CM

## 2021-09-22 DIAGNOSIS — I10 HTN, GOAL BELOW 130/80: ICD-10-CM

## 2021-09-22 DIAGNOSIS — E11.65 TYPE 2 DIABETES MELLITUS WITH HYPERGLYCEMIA, WITH LONG-TERM CURRENT USE OF INSULIN (HCC): ICD-10-CM

## 2021-09-22 LAB
ABSOLUTE EOS #: 0.76 K/UL (ref 0–0.44)
ABSOLUTE IMMATURE GRANULOCYTE: 0.08 K/UL (ref 0–0.3)
ABSOLUTE LYMPH #: 3.24 K/UL (ref 1.1–3.7)
ABSOLUTE MONO #: 0.63 K/UL (ref 0.1–1.2)
ALBUMIN SERPL-MCNC: 4.1 G/DL (ref 3.5–5.2)
ALT SERPL-CCNC: 33 U/L (ref 5–33)
ANION GAP SERPL CALCULATED.3IONS-SCNC: 16 MMOL/L (ref 9–17)
AST SERPL-CCNC: 30 U/L
BASOPHILS # BLD: 1 % (ref 0–2)
BASOPHILS ABSOLUTE: 0.1 K/UL (ref 0–0.2)
BUN BLDV-MCNC: 19 MG/DL (ref 6–20)
BUN/CREAT BLD: ABNORMAL (ref 9–20)
CALCIUM SERPL-MCNC: 9.1 MG/DL (ref 8.6–10.4)
CHLORIDE BLD-SCNC: 103 MMOL/L (ref 98–107)
CHOLESTEROL/HDL RATIO: 9.8
CHOLESTEROL: 304 MG/DL
CO2: 19 MMOL/L (ref 20–31)
CREAT SERPL-MCNC: 0.91 MG/DL (ref 0.5–0.9)
CREATININE URINE: 136.1 MG/DL (ref 28–217)
DIFFERENTIAL TYPE: ABNORMAL
EOSINOPHILS RELATIVE PERCENT: 7 % (ref 1–4)
ESTIMATED AVERAGE GLUCOSE: 143 MG/DL
GFR AFRICAN AMERICAN: >60 ML/MIN
GFR NON-AFRICAN AMERICAN: >60 ML/MIN
GFR SERPL CREATININE-BSD FRML MDRD: ABNORMAL ML/MIN/{1.73_M2}
GFR SERPL CREATININE-BSD FRML MDRD: ABNORMAL ML/MIN/{1.73_M2}
GLUCOSE BLD-MCNC: 146 MG/DL (ref 70–99)
HBA1C MFR BLD: 6.6 % (ref 4–6)
HCT VFR BLD CALC: 40.3 % (ref 36.3–47.1)
HDLC SERPL-MCNC: 31 MG/DL
HEMOGLOBIN: 12.6 G/DL (ref 11.9–15.1)
IMMATURE GRANULOCYTES: 1 %
LDL CHOLESTEROL: 204 MG/DL (ref 0–130)
LYMPHOCYTES # BLD: 32 % (ref 24–43)
MAGNESIUM: 2 MG/DL (ref 1.6–2.6)
MCH RBC QN AUTO: 30.7 PG (ref 25.2–33.5)
MCHC RBC AUTO-ENTMCNC: 31.3 G/DL (ref 28.4–34.8)
MCV RBC AUTO: 98.1 FL (ref 82.6–102.9)
MICROALBUMIN/CREAT 24H UR: <12 MG/L
MICROALBUMIN/CREAT UR-RTO: NORMAL MCG/MG CREAT
MONOCYTES # BLD: 6 % (ref 3–12)
NRBC AUTOMATED: 0 PER 100 WBC
PDW BLD-RTO: 13.1 % (ref 11.8–14.4)
PHOSPHORUS: 2.5 MG/DL (ref 2.6–4.5)
PLATELET # BLD: 340 K/UL (ref 138–453)
PLATELET ESTIMATE: ABNORMAL
PMV BLD AUTO: 10.4 FL (ref 8.1–13.5)
POTASSIUM SERPL-SCNC: 4.6 MMOL/L (ref 3.7–5.3)
RBC # BLD: 4.11 M/UL (ref 3.95–5.11)
RBC # BLD: ABNORMAL 10*6/UL
SEG NEUTROPHILS: 53 % (ref 36–65)
SEGMENTED NEUTROPHILS ABSOLUTE COUNT: 5.44 K/UL (ref 1.5–8.1)
SODIUM BLD-SCNC: 138 MMOL/L (ref 135–144)
TRIGL SERPL-MCNC: 343 MG/DL
VLDLC SERPL CALC-MCNC: ABNORMAL MG/DL (ref 1–30)
WBC # BLD: 10.3 K/UL (ref 3.5–11.3)
WBC # BLD: ABNORMAL 10*3/UL

## 2021-09-23 ENCOUNTER — TELEPHONE (OUTPATIENT)
Dept: FAMILY MEDICINE CLINIC | Age: 51
End: 2021-09-23

## 2021-09-23 ENCOUNTER — OFFICE VISIT (OUTPATIENT)
Dept: FAMILY MEDICINE CLINIC | Age: 51
End: 2021-09-23
Payer: COMMERCIAL

## 2021-09-23 VITALS
SYSTOLIC BLOOD PRESSURE: 120 MMHG | DIASTOLIC BLOOD PRESSURE: 60 MMHG | WEIGHT: 238 LBS | BODY MASS INDEX: 38.41 KG/M2 | OXYGEN SATURATION: 97 % | HEART RATE: 84 BPM

## 2021-09-23 DIAGNOSIS — I10 HTN, GOAL BELOW 130/80: ICD-10-CM

## 2021-09-23 DIAGNOSIS — Z71.3 DIETARY COUNSELING: ICD-10-CM

## 2021-09-23 DIAGNOSIS — Z79.4 TYPE 2 DIABETES MELLITUS WITH HYPERGLYCEMIA, WITH LONG-TERM CURRENT USE OF INSULIN (HCC): Primary | ICD-10-CM

## 2021-09-23 DIAGNOSIS — E78.5 DYSLIPIDEMIA: ICD-10-CM

## 2021-09-23 DIAGNOSIS — E78.5 DYSLIPIDEMIA: Primary | ICD-10-CM

## 2021-09-23 DIAGNOSIS — E11.65 TYPE 2 DIABETES MELLITUS WITH HYPERGLYCEMIA, WITH LONG-TERM CURRENT USE OF INSULIN (HCC): Primary | ICD-10-CM

## 2021-09-23 DIAGNOSIS — R20.0 LEFT UPPER EXTREMITY NUMBNESS: ICD-10-CM

## 2021-09-23 DIAGNOSIS — R20.0 NUMBNESS OF LEFT LOWER EXTREMITY: ICD-10-CM

## 2021-09-23 DIAGNOSIS — Z23 NEED FOR VACCINATION: ICD-10-CM

## 2021-09-23 PROCEDURE — G8417 CALC BMI ABV UP PARAM F/U: HCPCS | Performed by: FAMILY MEDICINE

## 2021-09-23 PROCEDURE — 90674 CCIIV4 VAC NO PRSV 0.5 ML IM: CPT | Performed by: FAMILY MEDICINE

## 2021-09-23 PROCEDURE — 99214 OFFICE O/P EST MOD 30 MIN: CPT | Performed by: FAMILY MEDICINE

## 2021-09-23 PROCEDURE — 3017F COLORECTAL CA SCREEN DOC REV: CPT | Performed by: FAMILY MEDICINE

## 2021-09-23 PROCEDURE — G8427 DOCREV CUR MEDS BY ELIG CLIN: HCPCS | Performed by: FAMILY MEDICINE

## 2021-09-23 PROCEDURE — 2022F DILAT RTA XM EVC RTNOPTHY: CPT | Performed by: FAMILY MEDICINE

## 2021-09-23 PROCEDURE — 1036F TOBACCO NON-USER: CPT | Performed by: FAMILY MEDICINE

## 2021-09-23 PROCEDURE — 90471 IMMUNIZATION ADMIN: CPT | Performed by: FAMILY MEDICINE

## 2021-09-23 PROCEDURE — 3044F HG A1C LEVEL LT 7.0%: CPT | Performed by: FAMILY MEDICINE

## 2021-09-23 RX ORDER — INSULIN GLARGINE 100 [IU]/ML
30 INJECTION, SOLUTION SUBCUTANEOUS EVERY EVENING
Qty: 15 PEN | Refills: 2 | Status: SHIPPED | OUTPATIENT
Start: 2021-09-23 | End: 2022-05-09

## 2021-09-23 RX ORDER — SEMAGLUTIDE 1.34 MG/ML
INJECTION, SOLUTION SUBCUTANEOUS
Qty: 1 PEN | Refills: 0 | COMMUNITY
Start: 2021-09-23 | End: 2021-12-03

## 2021-09-23 RX ORDER — PRAVASTATIN SODIUM 20 MG
20 TABLET ORAL DAILY
Qty: 90 TABLET | Refills: 0 | Status: SHIPPED | OUTPATIENT
Start: 2021-09-23 | End: 2021-12-03 | Stop reason: SDUPTHER

## 2021-09-23 NOTE — PATIENT INSTRUCTIONS

## 2021-09-23 NOTE — TELEPHONE ENCOUNTER
Patient had an appt today and asked if we could complete prior auth for [employer] on [facility] and spoke with Afua Barros who informed me that the medication was going through [employer] for $24. I called and advised patient of this which she was satisfied with.

## 2021-09-23 NOTE — PROGRESS NOTES
Progress Note    Adonis Croft is a 45 y.o.  female who presents today alone for evaluation of   Chief Complaint   Patient presents with    3 Month Follow-Up    Diabetes           HPI:   Patient is here for follow up on DM/HTN/dyslipidemia. Patient last HbA1c 6.9 9/2021. Patient -140. Patient states she takes 30 units of meal time basalglar daily and 10 units of lispro with meals. Patient states she does have n/t in her feet. Patient denies polyuria/polyphagia/polydipsia. Patient states she does have exertional calf cramping. Patient denies cp/sob/le edema/dizziness/lightheadedness/blurry va/ha. Patient denies PND/orthopnea. Patient reports history of DDD in her cervical spine. Patient notices n/t in her left thumb. Patient states this has been present for a few weeks. Patient states it did get worse after moving recently. Patient states she also has a history of lumbar DDD. Patient also reports n/t in her left foot. Patient needs refills and samples today.     Health Maintenance Due   Topic Date Due    Diabetic foot exam  Never done    Diabetic retinal exam  Never done    Hepatitis B vaccine (1 of 3 - Risk 3-dose series) Never done    Cervical cancer screen  Never done    Colon cancer screen colonoscopy  Never done    Breast cancer screen  Never done    Shingles Vaccine (1 of 2) Never done    Flu vaccine (1) 09/01/2021        Current Medications:     Current Outpatient Medications   Medication Sig Dispense Refill    pravastatin (PRAVACHOL) 20 MG tablet Take 1 tablet by mouth daily 90 tablet 0    Semaglutide,0.25 or 0.5MG/DOS, (OZEMPIC, 0.25 OR 0.5 MG/DOSE,) 2 MG/1.5ML SOPN Inject 0.5 mg weekly 1 pen 0    insulin glargine (BASAGLAR KWIKPEN) 100 UNIT/ML injection pen Inject 30 Units into the skin every evening 15 pen 2    insulin lispro (HUMALOG) 100 UNIT/ML injection vial Inject 10 Units into the skin 3 times daily (before meals) 10 mL 2    gabapentin (NEURONTIN) 300 MG capsule Take 1 capsule by mouth 3 times daily for 90 days. 270 capsule 0    Semaglutide,0.25 or 0.5MG/DOS, (OZEMPIC, 0.25 OR 0.5 MG/DOSE,) 2 MG/1.5ML SOPN Inject 0.25 mg weekly 1 pen 0    glimepiride (AMARYL) 2 MG tablet TAKE ONE TABLET BY MOUTH DAILY ITH BREAKFAST 90 tablet 0    glimepiride (AMARYL) 2 MG tablet Take 1 tablet by mouth daily (with breakfast) 90 tablet 0    lisinopril (PRINIVIL;ZESTRIL) 20 MG tablet Take 1 tablet by mouth daily 90 tablet 0    aspirin 81 MG chewable tablet Take 81 mg by mouth daily      dicyclomine (BENTYL) 20 MG tablet Take 1 tablet by mouth 4 times daily 360 tablet 0     No current facility-administered medications for this visit. Allergies: Allergies   Allergen Reactions    Latex Hives and Rash     After prolonged contact    After prolonged contact      Amoxicillin Hives    Atorvastatin Other (See Comments)     Stomach pain  Other reaction(s):  Other (See Comments), Other (See Comments)  Stomach pain  Stomach pain      Pineapple Itching        Medical History:     Past Medical History:   Diagnosis Date    Chronic deep vein thrombosis (DVT) of proximal vein of left lower extremity ([address]     DDD (degenerative disc disease), thoracolumbar     Dyslipidemia     Essential hypertension     Gastroesophageal reflux disease without esophagitis     Mood disorder (HCC)     Neuropathy     Trigeminal neuralgia     Type 2 diabetes mellitus with hyperglycemia, with long-term current use of insulin (HCC)        Past Surgical History:   Procedure Laterality Date    CATARACT REMOVAL WITH IMPLANT      CHOLECYSTECTOMY         Family History   Problem Relation Age of Onset    COPD Mother     Other Mother         PAD    Diabetes type 2  Father         Social History:     Social History     Socioeconomic History    Marital status:      Spouse name: Not on file    Number of children: Not on file    Years of education: Not on file    Highest education level: Not on file   Occupational History    Not on file   Tobacco Use    Smoking status: Former Smoker     Packs/day: 1.00     Years: 15.00     Pack years: 15.00     Start date:      Quit date: 2019     Years since quittin.0    Smokeless tobacco: Never Used    Tobacco comment: estimated quit date   Substance and Sexual Activity    Alcohol use: Never    Drug use: Never    Sexual activity: Not on file   Other Topics Concern    Not on file   Social History Narrative    Not on file     Social Determinants of Health     Financial Resource Strain: Low Risk     Difficulty of Paying Living Expenses: Not hard at all   Food Insecurity: No Food Insecurity    Worried About [address] [employer] in the Last Year: Never true    [address] in the Last Year: Never true   Transportation Needs: No Transportation Needs    Lack of Transportation (Medical): No    Lack of Transportation (Non-Medical): No   Physical Activity:     Days of Exercise per Week:     Minutes of Exercise per Session:    Stress:     Feeling of Stress :    Social Connections:     Frequency of Communication with Friends and Family:     Frequency of Social Gatherings with Friends and Family:     Attends Scientologist Services:     Active Member of Clubs or Organizations:     Attends Club or Organization Meetings:     Marital Status:    Intimate Partner Violence:     Fear of Current or Ex-Partner:     Emotionally Abused:     Physically Abused:     Sexually Abused:         ROS:     Constitutional: No fevers, chills, fatigue. ENT: No nasal congestion or sore throat  Respiratory: No difficulty in breathing or cough. Cardiovascular: No chest pain, palpitations or shortness of breath  Gastrointestinal: No abdominal pain or change in bowel movements. Genitourinary: No change in urinary frequency or dysuria. Skin: No rashes or skin lesions. Neurological: No weakness. No headaches.  +LUE/LLE n/t         Last Filed Vitals:  /60 (Site: Left Upper Arm, Position: Sitting, Cuff Size: Medium Adult)   Pulse 84   Wt 238 lb (108 kg)   SpO2 97%   BMI 38.41 kg/m²      Physical Examination:     GENERAL APPEARANCE: in no acute distress, well developed, well nourished. HEAD: normocephalic, atraumatic. EYES: extraocular movement intact (EOMI), pupils equal, round, reactive to light and accommodation. EARS: normal, tympanic membrane intact, clear, auditory canal clear. NOSE: nares patent, no erythema, sinuses nontender bilaterally, no rhinorrhea. ORAL CAVITY: mucosa moist, no lesions. THROAT: clear, no mass, no exudate. NECK/THYROID: neck supple, full range of motion, no thyromegaly. HEART: no murmurs, regular rate and rhythm, S1, S2 normal.   LUNGS: clear to auscultation bilaterally, no wheezes, rales, rhonchi.    ABDOMEN: normal, bowel sounds present, soft, nontender, nondistended, no rebound guarding or rigidity    Recent Labs/ In Office Testing/ Radiograph review:     Hospital Outpatient Visit on 09/22/2021   Component Date Value Ref Range Status    ALT 09/22/2021 33  5 - 33 U/L Final    AST 09/22/2021 30  <32 U/L Final    WBC 09/22/2021 10.3  3.5 - 11.3 k/uL Final    RBC 09/22/2021 4.11  3.95 - 5.11 m/uL Final    Hemoglobin 09/22/2021 12.6  11.9 - 15.1 g/dL Final    Hematocrit 09/22/2021 40.3  36.3 - 47.1 % Final    MCV 09/22/2021 98.1  82.6 - 102.9 fL Final    MCH 09/22/2021 30.7  25.2 - 33.5 pg Final    MCHC 09/22/2021 31.3  28.4 - 34.8 g/dL Final    RDW 09/22/2021 13.1  11.8 - 14.4 % Final    Platelets 24/60/5659 340  138 - 453 k/uL Final    MPV 09/22/2021 10.4  8.1 - 13.5 fL Final    NRBC Automated 09/22/2021 0.0  0.0 per 100 WBC Final    Differential Type 09/22/2021 NOT REPORTED   Final    Seg Neutrophils 09/22/2021 53  36 - 65 % Final    Lymphocytes 09/22/2021 32  24 - 43 % Final    Monocytes 09/22/2021 6  3 - 12 % Final    Eosinophils % 09/22/2021 7* 1 - 4 % Final    Basophils 09/22/2021 1  0 - 2 % Final    Immature Granulocytes 09/22/2021 1* 0 % Final    Segs Absolute 09/22/2021 5.44  1.50 - 8.10 k/uL Final    Absolute Lymph # 09/22/2021 3.24  1.10 - 3.70 k/uL Final    Absolute Mono # 09/22/2021 0.63  0.10 - 1.20 k/uL Final    Absolute Eos # 09/22/2021 0.76* 0.00 - 0.44 k/uL Final    Basophils Absolute 09/22/2021 0.10  0.00 - 0.20 k/uL Final    Absolute Immature Granulocyte 09/22/2021 0.08  0.00 - 0.30 k/uL Final    WBC Morphology 09/22/2021 NOT REPORTED   Final    RBC Morphology 09/22/2021 NOT REPORTED   Final    Platelet Estimate 26/78/1440 NOT REPORTED   Final    Hemoglobin A1C 09/22/2021 6.6* 4.0 - 6.0 % Final    Estimated Avg Glucose 09/22/2021 143  mg/dL Final    Comment: The ADA and AACC recommend providing the estimated average glucose result to permit better   patient understanding of their HBA1c result.  Cholesterol 09/22/2021 304* <200 mg/dL Final    Comment:    Cholesterol Guidelines:      <200  Desirable   200-240  Borderline      >240  Undesirable         HDL 09/22/2021 31* >40 mg/dL Final    Comment:    HDL Guidelines:    <40     Undesirable   40-59    Borderline    >59     Desirable         LDL Cholesterol 09/22/2021 204* 0 - 130 mg/dL Final    Comment:    LDL Guidelines:     <100    Desirable   100-129   Near to/above Desirable   130-159   Borderline      >159   Undesirable     Direct (measured) LDL and calculated LDL are not interchangeable tests.  Chol/HDL Ratio 09/22/2021 9.8* <5 Final            Triglycerides 09/22/2021 343* <150 mg/dL Final    Comment:    Triglyceride Guidelines:     <150   Desirable   150-199  Borderline   200-499  High     >499   Very high   Based on AHA Guidelines for fasting triglyceride, October 2012.  VLDL 09/22/2021 NOT REPORTED* 1 - 30 mg/dL Final    Magnesium 09/22/2021 2.0  1.6 - 2.6 mg/dL Final    Microalb, Ur 09/22/2021 <12  <21 mg/L Final    Creatinine, Ur 09/22/2021 136.1  28.0 - 217.0 mg/dL Final    Microalb/Crt.  Ratio 09/22/2021 CANNOT Panel; Future    HTN, goal below 130/80  -     Magnesium; Future  -     Renal Function Panel; Future    BMI 38.0-38.9,adult    Dietary counseling  -      BMI ABOVE NORMAL F/U    Need for vaccination  -     INFLUENZA, MDCK QUADV, 2 YRS AND OLDER, IM, PF, PREFILL SYR OR SDV, 0.5ML (FLUCELVAX QUADV, PF)    Left upper extremity numbness  -     Yamile Smith MD, [facility], [place]    Numbness of left lower extremity  -     Yamile Smith MD, [facility], [place]    Follow up on labs. Encouraged well balanced diet. Encouraged 150 mins of aerobic activity weekly. Continue current medical regimen. Flu shot today. All questions answered and addressed to patient satisfaction. Patient understands and agrees to the plan. The patient was evaluated and treated today based on the osteopathic principle that each person is a unit of body, mind, and spirit, the body is capable of self-regulation, self-healing, and health maintenance and that structure and function are reciprocally interrelated. Follow-up:   Return in about 4 months (around 1/23/2022) for dm; 20 min appt. Caridad Bridges D.O.    BMI was elevated today, and weight loss plan recommended is : conventional weight loss.

## 2021-09-29 ENCOUNTER — TELEPHONE (OUTPATIENT)
Dept: FAMILY MEDICINE CLINIC | Age: 51
End: 2021-09-29

## 2021-09-29 NOTE — TELEPHONE ENCOUNTER
Patient called. She states she was told by pharmacy that Humalog needs a PA  They told her they have tried to send the request with no response from us. Anusha told me to send these to you for Dr. Mary Lamas.

## 2021-10-07 DIAGNOSIS — Z76.0 MEDICATION REFILL: ICD-10-CM

## 2021-10-07 RX ORDER — BUSPIRONE HYDROCHLORIDE 30 MG/1
TABLET ORAL
Qty: 90 TABLET | Refills: 0 | Status: SHIPPED | OUTPATIENT
Start: 2021-10-07 | End: 2021-11-29 | Stop reason: SDUPTHER

## 2021-10-07 RX ORDER — GLIMEPIRIDE 2 MG/1
TABLET ORAL
Qty: 90 TABLET | Refills: 0 | Status: SHIPPED | OUTPATIENT
Start: 2021-10-07 | End: 2021-11-23

## 2021-10-07 RX ORDER — LISINOPRIL 20 MG/1
TABLET ORAL
Qty: 90 TABLET | Refills: 0 | Status: ON HOLD
Start: 2021-10-07 | End: 2021-12-15 | Stop reason: HOSPADM

## 2021-10-07 NOTE — TELEPHONE ENCOUNTER
Cathy Vivas is calling to request a refill on the following medication(s):    Medication Request:  Requested Prescriptions     Pending Prescriptions Disp Refills    busPIRone (BUSPAR) 30 MG tablet [Pharmacy Med Name: busPIRone HCL 30 MG TABLET] 90 tablet      Sig: TAKE ONE TABLET BY MOUTH THREE TIMES A DAY    lisinopril (PRINIVIL;ZESTRIL) 20 MG tablet [Pharmacy Med Name: LISINOPRIL 20 MG TABLET] 90 tablet 0     Sig: TAKE ONE TABLET BY MOUTH DAILY    glimepiride (AMARYL) 2 MG tablet [Pharmacy Med Name: GLIMEPIRIDE 2 MG TABLET] 90 tablet 0     Sig: TAKE ONE TABLET BY MOUTH DAILY WITH BREAKFAST       Last Visit Date (If Applicable):  8/72/4715    Next Visit Date:    1/27/2022

## 2021-10-11 ENCOUNTER — TELEPHONE (OUTPATIENT)
Dept: FAMILY MEDICINE CLINIC | Age: 51
End: 2021-10-11

## 2021-10-11 DIAGNOSIS — E11.65 TYPE 2 DIABETES MELLITUS WITH HYPERGLYCEMIA, WITH LONG-TERM CURRENT USE OF INSULIN (HCC): Primary | ICD-10-CM

## 2021-10-11 DIAGNOSIS — Z79.4 TYPE 2 DIABETES MELLITUS WITH HYPERGLYCEMIA, WITH LONG-TERM CURRENT USE OF INSULIN (HCC): Primary | ICD-10-CM

## 2021-10-11 RX ORDER — INSULIN ASPART 100 [IU]/ML
10 INJECTION, SOLUTION INTRAVENOUS; SUBCUTANEOUS
Qty: 27 ML | Refills: 1 | Status: SHIPPED | OUTPATIENT
Start: 2021-10-11 | End: 2021-11-23

## 2021-10-11 NOTE — TELEPHONE ENCOUNTER
Pts Humalog was not cover by insurance. Alternative suggested med was Novolog flexpen 100unit/ ml. Dr Vee Postal sent alternative in. Pt advised.

## 2021-10-14 ENCOUNTER — TELEPHONE (OUTPATIENT)
Dept: FAMILY MEDICINE CLINIC | Age: 51
End: 2021-10-14

## 2021-10-14 DIAGNOSIS — Z79.4 TYPE 2 DIABETES MELLITUS WITH HYPERGLYCEMIA, WITH LONG-TERM CURRENT USE OF INSULIN (HCC): Primary | ICD-10-CM

## 2021-10-14 DIAGNOSIS — E11.65 TYPE 2 DIABETES MELLITUS WITH HYPERGLYCEMIA, WITH LONG-TERM CURRENT USE OF INSULIN (HCC): Primary | ICD-10-CM

## 2021-10-14 NOTE — TELEPHONE ENCOUNTER
Patient called - novolog flexpen is too expensive with patient's insurance. She was told the vial would be cheaper. Can you send this in for her?

## 2021-10-15 NOTE — TELEPHONE ENCOUNTER
Pt advised and states she does not need them as of right now but will let provider know when she does

## 2021-10-19 DIAGNOSIS — Z12.11 COLON CANCER SCREENING: ICD-10-CM

## 2021-10-22 ENCOUNTER — HOSPITAL ENCOUNTER (OUTPATIENT)
Dept: WOMENS IMAGING | Age: 51
Discharge: HOME | End: 2021-10-24
Payer: COMMERCIAL

## 2021-10-22 DIAGNOSIS — Z12.31 BREAST CANCER SCREENING BY MAMMOGRAM: ICD-10-CM

## 2021-10-22 PROCEDURE — 77063 BREAST TOMOSYNTHESIS BI: CPT

## 2021-11-04 ENCOUNTER — PATIENT MESSAGE (OUTPATIENT)
Dept: FAMILY MEDICINE CLINIC | Age: 51
End: 2021-11-04

## 2021-11-05 NOTE — TELEPHONE ENCOUNTER
From: Benavides Dates  To: Manuel Duenas DO  Sent: 11/4/2021 4:48 PM EDT  Subject: Non-Urgent Medical Question    VL Duplex Lower Extremity Arteries Bilateral  ordered by Manuel Duenas DO on 3/19/2021    Hi,    I have this test ordered but I don't know who to call to schedule it as I have misplaced my paper copy. Please advise.     Thank you,  Miguel Angel Avitia

## 2021-11-20 ENCOUNTER — PATIENT MESSAGE (OUTPATIENT)
Dept: FAMILY MEDICINE CLINIC | Age: 51
End: 2021-11-20

## 2021-11-21 NOTE — TELEPHONE ENCOUNTER
From: Rachel Triana  To: Dr. Atilio Butterfield  Sent: 11/20/2021 8:37 PM EST  Subject: Non-Urgent Medical Question    Hi,    These red spots appeared today , they do not hurt or itch but they are on both legs. Just wondering if I should make appointment to be seen?     Thanks  Petar Mcclain

## 2021-11-23 ENCOUNTER — OFFICE VISIT (OUTPATIENT)
Dept: FAMILY MEDICINE CLINIC | Age: 51
End: 2021-11-23
Payer: COMMERCIAL

## 2021-11-23 VITALS
OXYGEN SATURATION: 100 % | WEIGHT: 239 LBS | DIASTOLIC BLOOD PRESSURE: 74 MMHG | HEART RATE: 84 BPM | BODY MASS INDEX: 38.58 KG/M2 | SYSTOLIC BLOOD PRESSURE: 138 MMHG

## 2021-11-23 DIAGNOSIS — L24.5 IRRITANT CONTACT DERMATITIS DUE TO OTHER CHEMICAL PRODUCTS: Primary | ICD-10-CM

## 2021-11-23 PROCEDURE — 3017F COLORECTAL CA SCREEN DOC REV: CPT | Performed by: FAMILY MEDICINE

## 2021-11-23 PROCEDURE — G8482 FLU IMMUNIZE ORDER/ADMIN: HCPCS | Performed by: FAMILY MEDICINE

## 2021-11-23 PROCEDURE — 1036F TOBACCO NON-USER: CPT | Performed by: FAMILY MEDICINE

## 2021-11-23 PROCEDURE — G8417 CALC BMI ABV UP PARAM F/U: HCPCS | Performed by: FAMILY MEDICINE

## 2021-11-23 PROCEDURE — 99213 OFFICE O/P EST LOW 20 MIN: CPT | Performed by: FAMILY MEDICINE

## 2021-11-23 PROCEDURE — G8427 DOCREV CUR MEDS BY ELIG CLIN: HCPCS | Performed by: FAMILY MEDICINE

## 2021-11-23 RX ORDER — MOMETASONE FUROATE 1 MG/G
CREAM TOPICAL
Qty: 50 G | Refills: 0 | Status: SHIPPED | OUTPATIENT
Start: 2021-11-23 | End: 2022-07-08

## 2021-11-23 NOTE — PROGRESS NOTES
Progress Note    Amilcar Galeano is a 45 y.o.  female who presents today alone for evaluation of   Chief Complaint   Patient presents with    Rash           HPI:   Patient is here for same day visit to discuss rash. Patient states the rash is located to her bilateral ankle/foot area. Patient states the rash started a few days ago after putting on a pair of socks that she had not worn in many years. Patient states the rash is itchy. Patient denies blisters. Patient denies drainage. Patient denies fevers or chills. Health Maintenance Due   Topic Date Due    Diabetic foot exam  Never done    Diabetic retinal exam  Never done    Hepatitis B vaccine (1 of 3 - Risk 3-dose series) Never done    Cervical cancer screen  Never done    COVID-19 Vaccine (2 - Booster for Picmon[place] series) 05/03/2021        Current Medications:     Current Outpatient Medications   Medication Sig Dispense Refill    busPIRone (BUSPAR) 30 MG tablet TAKE ONE TABLET BY MOUTH THREE TIMES A DAY 90 tablet 0    lisinopril (PRINIVIL;ZESTRIL) 20 MG tablet TAKE ONE TABLET BY MOUTH DAILY 90 tablet 0    pravastatin (PRAVACHOL) 20 MG tablet Take 1 tablet by mouth daily 90 tablet 0    Semaglutide,0.25 or 0.5MG/DOS, (OZEMPIC, 0.25 OR 0.5 MG/DOSE,) 2 MG/1.5ML SOPN Inject 0.5 mg weekly 1 pen 0    insulin glargine (BASAGLAR KWIKPEN) 100 UNIT/ML injection pen Inject 30 Units into the skin every evening 15 pen 2    gabapentin (NEURONTIN) 300 MG capsule Take 1 capsule by mouth 3 times daily for 90 days. 270 capsule 0    glimepiride (AMARYL) 2 MG tablet Take 1 tablet by mouth daily (with breakfast) 90 tablet 0    aspirin 81 MG chewable tablet Take 81 mg by mouth daily      dicyclomine (BENTYL) 20 MG tablet Take 1 tablet by mouth 4 times daily 360 tablet 0    mometasone (ELOCON) 0.1 % cream Apply topically daily. 50 g 0     No current facility-administered medications for this visit. Allergies:      Allergies   Allergen Reactions    Latex Hives and Rash     After prolonged contact    After prolonged contact      Amoxicillin Hives    Atorvastatin Other (See Comments)     Stomach pain  Other reaction(s):  Other (See Comments), Other (See Comments)  Stomach pain  Stomach pain      Pineapple Itching        Medical History:     Past Medical History:   Diagnosis Date    Chronic deep vein thrombosis (DVT) of proximal vein of left lower extremity ([address]     DDD (degenerative disc disease), thoracolumbar     Dyslipidemia     Essential hypertension     Gastroesophageal reflux disease without esophagitis     Mood disorder (HCC)     Neuropathy     Trigeminal neuralgia     Type 2 diabetes mellitus with hyperglycemia, with long-term current use of insulin ([facility])        Past Surgical History:   Procedure Laterality Date    CATARACT REMOVAL WITH IMPLANT      CHOLECYSTECTOMY         Family History   Problem Relation Age of Onset    COPD Mother     Other Mother         PAD    Diabetes type 2  Father         Social History:     Social History     Socioeconomic History    Marital status:      Spouse name: Not on file    Number of children: Not on file    Years of education: Not on file    Highest education level: Not on file   Occupational History    Not on file   Tobacco Use    Smoking status: Former Smoker     Packs/day: 1.00     Years: 15.00     Pack years: 15.00     Start date:      Quit date: 2019     Years since quittin.2    Smokeless tobacco: Never Used    Tobacco comment: estimated quit date   Substance and Sexual Activity    Alcohol use: Never    Drug use: Never    Sexual activity: Not on file   Other Topics Concern    Not on file   Social History Narrative    Not on file     Social Determinants of Health     Financial Resource Strain: Low Risk     Difficulty of Paying Living Expenses: Not hard at all   Food Insecurity: No Food Insecurity    Worried About Running Out of Food in the Last Year: Never true   Guajardo Ran Out of Food in the Last Year: Never true   Transportation Needs: No Transportation Needs    Lack of Transportation (Medical): No    Lack of Transportation (Non-Medical): No   Physical Activity:     Days of Exercise per Week: Not on file    Minutes of Exercise per Session: Not on file   Stress:     Feeling of Stress : Not on file   Social Connections:     Frequency of Communication with Friends and Family: Not on file    Frequency of Social Gatherings with Friends and Family: Not on file    Attends Baptism Services: Not on file    Active Member of [address] [place] [address] or Organizations: Not on file    Attends Club or Organization Meetings: Not on file    Marital Status: Not on file   Intimate Partner Violence:     Fear of Current or Ex-Partner: Not on file    Emotionally Abused: Not on file    Physically Abused: Not on file    Sexually Abused: Not on file   Housing Stability:     Unable to Pay for Housing in the Last Year: Not on file    Number of [place] in the Last Year: Not on file    Unstable Housing in the Last Year: Not on file        ROS:     Constitutional: No fevers, chills, fatigue. ENT: No nasal congestion or sore throat  Respiratory: No difficulty in breathing or cough. Cardiovascular: No chest pain, palpitations or shortness of breath  Gastrointestinal: No abdominal pain or change in bowel movements. Genitourinary: No change in urinary frequency or dysuria. Skin: + rash; no skin lesions. Neurological: No weakness. No headaches. Last Filed Vitals:  /74   Pulse 84   Wt 239 lb (108.4 kg)   SpO2 100%   BMI 38.58 kg/m²      Physical Examination:     GENERAL APPEARANCE: in no acute distress, well developed, well nourished. HEAD: normocephalic, atraumatic. EYES: extraocular movement intact (EOMI), pupils equal, round, reactive to light and accommodation. EARS: normal, tympanic membrane intact, clear, auditory canal clear.    NOSE: nares patent, no erythema, sinuses nontender bilaterally, no rhinorrhea. ORAL CAVITY: mucosa moist, no lesions. THROAT: clear, no mass, no exudate. NECK/THYROID: neck supple, full range of motion, no thyromegaly. HEART: no murmurs, regular rate and rhythm, S1, S2 normal.   LUNGS: clear to auscultation bilaterally, no wheezes, rales, rhonchi.    ABDOMEN: normal, bowel sounds present, soft, nontender, nondistended, no rebound guarding or rigidity  SKIN: +erythematous patches over bilateral ankle    Recent Labs/ In Office Testing/ Radiograph review:     Hospital Outpatient Visit on 09/22/2021   Component Date Value Ref Range Status    ALT 09/22/2021 33  5 - 33 U/L Final    AST 09/22/2021 30  <32 U/L Final    WBC 09/22/2021 10.3  3.5 - 11.3 k/uL Final    RBC 09/22/2021 4.11  3.95 - 5.11 m/uL Final    Hemoglobin 09/22/2021 12.6  11.9 - 15.1 g/dL Final    Hematocrit 09/22/2021 40.3  36.3 - 47.1 % Final    MCV 09/22/2021 98.1  82.6 - 102.9 fL Final    MCH 09/22/2021 30.7  25.2 - 33.5 pg Final    MCHC 09/22/2021 31.3  28.4 - 34.8 g/dL Final    RDW 09/22/2021 13.1  11.8 - 14.4 % Final    Platelets 22/06/4869 340  138 - 453 k/uL Final    MPV 09/22/2021 10.4  8.1 - 13.5 fL Final    NRBC Automated 09/22/2021 0.0  0.0 per 100 WBC Final    Differential Type 09/22/2021 NOT REPORTED   Final    Seg Neutrophils 09/22/2021 53  36 - 65 % Final    Lymphocytes 09/22/2021 32  24 - 43 % Final    Monocytes 09/22/2021 6  3 - 12 % Final    Eosinophils % 09/22/2021 7* 1 - 4 % Final    Basophils 09/22/2021 1  0 - 2 % Final    Immature Granulocytes 09/22/2021 1* 0 % Final    Segs Absolute 09/22/2021 5.44  1.50 - 8.10 k/uL Final    Absolute Lymph # 09/22/2021 3.24  1.10 - 3.70 k/uL Final    Absolute Mono # 09/22/2021 0.63  0.10 - 1.20 k/uL Final    Absolute Eos # 09/22/2021 0.76* 0.00 - 0.44 k/uL Final    Basophils Absolute 09/22/2021 0.10  0.00 - 0.20 k/uL Final    Absolute Immature Granulocyte 09/22/2021 0.08  0.00 - 0.30 k/uL Final    WBC Morphology 09/22/2021 NOT REPORTED   Final    RBC Morphology 09/22/2021 NOT REPORTED   Final    Platelet Estimate 37/75/3522 NOT REPORTED   Final    Hemoglobin A1C 09/22/2021 6.6* 4.0 - 6.0 % Final    Estimated Avg Glucose 09/22/2021 143  mg/dL Final    Comment: The ADA and AACC recommend providing the estimated average glucose result to permit better   patient understanding of their HBA1c result.  Cholesterol 09/22/2021 304* <200 mg/dL Final    Comment:    Cholesterol Guidelines:      <200  Desirable   200-240  Borderline      >240  Undesirable         HDL 09/22/2021 31* >40 mg/dL Final    Comment:    HDL Guidelines:    <40     Undesirable   40-59    Borderline    >59     Desirable         LDL Cholesterol 09/22/2021 204* 0 - 130 mg/dL Final    Comment:    LDL Guidelines:     <100    Desirable   100-129   Near to/above Desirable   130-159   Borderline      >159   Undesirable     Direct (measured) LDL and calculated LDL are not interchangeable tests.  Chol/HDL Ratio 09/22/2021 9.8* <5 Final            Triglycerides 09/22/2021 343* <150 mg/dL Final    Comment:    Triglyceride Guidelines:     <150   Desirable   150-199  Borderline   200-499  High     >499   Very high   Based on AHA Guidelines for fasting triglyceride, October 2012.  VLDL 09/22/2021 NOT REPORTED* 1 - 30 mg/dL Final    Magnesium 09/22/2021 2.0  1.6 - 2.6 mg/dL Final    Microalb, Ur 09/22/2021 <12  <21 mg/L Final    Creatinine, Ur 09/22/2021 136.1  28.0 - 217.0 mg/dL Final    Microalb/Crt.  Ratio 09/22/2021 CANNOT BE CALCULATED  <25 mcg/mg creat Final    Glucose 09/22/2021 146* 70 - 99 mg/dL Final    BUN 09/22/2021 19  6 - 20 mg/dL Final    CREATININE 09/22/2021 0.91* 0.50 - 0.90 mg/dL Final    Bun/Cre Ratio 09/22/2021 NOT REPORTED  9 - 20 Final    Calcium 09/22/2021 9.1  8.6 - 10.4 mg/dL Final    Albumin 09/22/2021 4.1  3.5 - 5.2 g/dL Final    Phosphorus 09/22/2021 2.5* 2.6 - 4.5 mg/dL Final    Sodium 09/22/2021 138 135 - 144 mmol/L Final    Potassium 09/22/2021 4.6  3.7 - 5.3 mmol/L Final    Chloride 09/22/2021 103  98 - 107 mmol/L Final    CO2 09/22/2021 19* 20 - 31 mmol/L Final    Anion Gap 09/22/2021 16  9 - 17 mmol/L Final    GFR Non- 09/22/2021 >60  >60 mL/min Final    GFR  09/22/2021 >60  >60 mL/min Final    GFR Comment 09/22/2021        Final    Comment: Average GFR for 52-63 years old:   80 mL/min/1.73sq m  Chronic Kidney Disease:   <60 mL/min/1.73sq m  Kidney failure:   <15 mL/min/1.73sq m              eGFR calculated using average adult body mass. Additional eGFR calculator available at:        [email or web address]            GFR Staging 09/22/2021 NOT REPORTED   Final       No results found for this visit on 11/23/21. Assessment/Plan:     Antoine Weber was seen today for rash. Diagnoses and all orders for this visit:    Irritant contact dermatitis due to other chemical products  -     mometasone (ELOCON) 0.1 % cream; Apply topically daily. Strongly suspect contact dermatitis based on distribution of rash and history with socks. Rx as above. RTC precautions provided. All questions answered and addressed to patient satisfaction. Patient understands and agrees to the plan. The patient was evaluated and treated today based on the osteopathic principle that each person is a unit of body, mind, and spirit, the body is capable of self-regulation, self-healing, and health maintenance and that structure and function are reciprocally interrelated. Follow-up:   Return if symptoms worsen or fail to improve.       Charisma Bray D.O.

## 2021-11-29 ENCOUNTER — PROCEDURE VISIT (OUTPATIENT)
Dept: PHYSICAL MEDICINE AND REHAB | Age: 51
End: 2021-11-29
Payer: COMMERCIAL

## 2021-11-29 DIAGNOSIS — Z76.0 MEDICATION REFILL: ICD-10-CM

## 2021-11-29 DIAGNOSIS — R20.0 NUMBNESS OF LEFT LOWER EXTREMITY: ICD-10-CM

## 2021-11-29 DIAGNOSIS — R20.0 NUMBNESS OF LEFT HAND: ICD-10-CM

## 2021-11-29 PROCEDURE — 95911 NRV CNDJ TEST 9-10 STUDIES: CPT | Performed by: STUDENT IN AN ORGANIZED HEALTH CARE EDUCATION/TRAINING PROGRAM

## 2021-11-29 PROCEDURE — 95886 MUSC TEST DONE W/N TEST COMP: CPT | Performed by: STUDENT IN AN ORGANIZED HEALTH CARE EDUCATION/TRAINING PROGRAM

## 2021-11-30 ENCOUNTER — OFFICE VISIT (OUTPATIENT)
Dept: PRIMARY CARE CLINIC | Age: 51
End: 2021-11-30
Payer: COMMERCIAL

## 2021-11-30 ENCOUNTER — HOSPITAL ENCOUNTER (OUTPATIENT)
Age: 51
Setting detail: SPECIMEN
Discharge: HOME | End: 2021-11-30

## 2021-11-30 VITALS
SYSTOLIC BLOOD PRESSURE: 86 MMHG | TEMPERATURE: 97 F | HEART RATE: 83 BPM | BODY MASS INDEX: 38.41 KG/M2 | HEIGHT: 66 IN | WEIGHT: 239 LBS | OXYGEN SATURATION: 98 % | DIASTOLIC BLOOD PRESSURE: 56 MMHG

## 2021-11-30 DIAGNOSIS — R68.89 FLU-LIKE SYMPTOMS: ICD-10-CM

## 2021-11-30 DIAGNOSIS — Z20.822 SUSPECTED COVID-19 VIRUS INFECTION: Primary | ICD-10-CM

## 2021-11-30 LAB
INFLUENZA A ANTIBODY: NORMAL
INFLUENZA B ANTIBODY: NORMAL

## 2021-11-30 PROCEDURE — 3017F COLORECTAL CA SCREEN DOC REV: CPT | Performed by: FAMILY MEDICINE

## 2021-11-30 PROCEDURE — G8417 CALC BMI ABV UP PARAM F/U: HCPCS | Performed by: FAMILY MEDICINE

## 2021-11-30 PROCEDURE — 1036F TOBACCO NON-USER: CPT | Performed by: FAMILY MEDICINE

## 2021-11-30 PROCEDURE — G8482 FLU IMMUNIZE ORDER/ADMIN: HCPCS | Performed by: FAMILY MEDICINE

## 2021-11-30 PROCEDURE — 87804 INFLUENZA ASSAY W/OPTIC: CPT | Performed by: FAMILY MEDICINE

## 2021-11-30 PROCEDURE — 99213 OFFICE O/P EST LOW 20 MIN: CPT | Performed by: FAMILY MEDICINE

## 2021-11-30 PROCEDURE — G8427 DOCREV CUR MEDS BY ELIG CLIN: HCPCS | Performed by: FAMILY MEDICINE

## 2021-11-30 RX ORDER — BUSPIRONE HYDROCHLORIDE 30 MG/1
30 TABLET ORAL 3 TIMES DAILY
Qty: 90 TABLET | Refills: 0 | Status: SHIPPED | OUTPATIENT
Start: 2021-11-30 | End: 2022-01-06 | Stop reason: SDUPTHER

## 2021-11-30 ASSESSMENT — ENCOUNTER SYMPTOMS
VOMITING: 0
RHINORRHEA: 1
NAUSEA: 1
WHEEZING: 1
SORE THROAT: 1
DIARRHEA: 0
ABDOMINAL PAIN: 0
COUGH: 1

## 2021-11-30 NOTE — PATIENT INSTRUCTIONS
Patient Education        Learning About Coronavirus [phone])  What is coronavirus (COVID-19)? COVID-19 is a disease caused by a type of coronavirus. This illness was first found in December 2019. It has since spread worldwide. Coronaviruses are a large group of viruses. They cause the common cold. They also cause more serious illnesses like [place] East respiratory syndrome (MERS) and severe acute respiratory syndrome (SARS). COVID-19 is caused by a novel coronavirus. That means it's a new type that has not been seen in people before. What are the symptoms? COVID-19 symptoms may include:  · Fever. · Cough. · Trouble breathing. · Chills or repeated shaking with chills. · Muscle and body aches. · Headache. · Sore throat. · New loss of taste or smell. · Vomiting. · Diarrhea. In severe cases, COVID-19 can cause pneumonia and make it hard to breathe without help from a machine. It can cause death. How is it diagnosed? COVID-19 is diagnosed with a viral test. This may also be called a PCR test or antigen test. It looks for evidence of the virus in your breathing passages or lungs (respiratory system). The test is most often done on a sample from the nose, throat, or lungs. It's sometimes done on a sample of saliva. One way a sample is collected is by putting a long swab into the back of your nose. How is it treated? Mild cases of COVID-19 can be treated at home. Serious cases need treatment in the hospital. Treatment may include medicines to reduce symptoms, plus breathing support such as oxygen therapy or a ventilator. Some people may be placed on their belly to help their oxygen levels. Treatments that may help people who have COVID-19 include:  Antiviral medicines. These medicines treat viral infections. Remdesivir is an example. Immune-based therapy. These medicines help the immune system fight COVID-19. Examples include monoclonal antibodies. Blood thinners.    These medicines help prevent blood clots. People with severe illness are at risk for blood clots. How can you protect yourself and others? The best way to protect yourself from getting sick is to:  · Get vaccinated. · Avoid sick people. · If you are not fully vaccinated:  ? Wear a mask if you have to go to public areas. ? Avoid crowds and try to stay at least 6 feet away from other people. · Cover your mouth with a tissue when you cough or sneeze. · Wash your hands often, especially after you cough or sneeze. Use soap and water, and scrub for at least 20 seconds. If soap and water aren't available, use an alcohol-based hand . · Avoid touching your mouth, nose, and eyes. To help avoid spreading the virus to others:  · Get vaccinated. · Cover your mouth with a tissue when you cough or sneeze. · Wash your hands often, especially after you cough or sneeze. Use soap and water, and scrub for at least 20 seconds. If soap and water aren't available, use an alcohol-based hand . · If you have been exposed to the virus and are not fully vaccinated:  ? Stay home. Don't go to school, work, or public areas. And don't use public transportation, ride-shares, or taxis unless you have no choice. ? Wear a mask if you have to go to public areas, like the pharmacy. · If you're sick:  ? Leave your home only if you need to get medical care. But call the doctor's office first so they know you're coming. And wear a mask. ? Wear a mask whenever you're around other people. ? Limit contact with pets and people in your home. If possible, stay in a separate bedroom and use a separate bathroom. ? Clean and disinfect your home every day. Use household  and disinfectant wipes or sprays. Take special care to clean things that you touch with your hands. How can you self-isolate when you have COVID-19? If you have COVID-19, there are things you can do to help avoid spreading the virus to others.   · Limit contact with people in your home. If possible, stay in a separate bedroom and use a separate bathroom. · Wear a mask when you are around other people. · If you have to leave home, avoid crowds and try to stay at least 6 feet away from other people. · Avoid contact with pets and other animals. · Cover your mouth and nose with a tissue when you cough or sneeze. Then throw it in the trash right away. · Wash your hands often, especially after you cough or sneeze. Use soap and water, and scrub for at least 20 seconds. If soap and water aren't available, use an alcohol-based hand . · Don't share personal household items. These include bedding, towels, cups and glasses, and eating utensils. · [address] in the warmest water allowed for the fabric type, and dry it completely. It's okay to wash other people's laundry with yours. · Clean and disinfect your home. Use household  and disinfectant wipes or sprays. When should you call for help? Call 911 anytime you think you may need emergency care. For example, call if you have life-threatening symptoms, such as:    · You have severe trouble breathing. (You can't talk at all.)     · You have constant chest pain or pressure.     · You are severely dizzy or lightheaded.     · You are confused or can't think clearly.     · You have pale, gray, or blue-colored skin or lips.     · You pass out (lose consciousness) or are very hard to wake up. Call your doctor now or seek immediate medical care if:    · You have moderate trouble breathing. (You can't speak a full sentence.)     · You are coughing up blood (more than about 1 teaspoon).     · You have signs of low blood pressure. These include feeling lightheaded; being too weak to stand; and having cold, pale, clammy skin.    Watch closely for changes in your health, and be sure to contact your doctor if:    · Your symptoms get worse.     · You are not getting better as expected.     · You have new or worse symptoms of anxiety, depression, nightmares, or flashbacks. Call before you go to the doctor's office. Follow their instructions. And wear a mask. Current as of: July 1, 2021               Content Version: 13.0  © 2006-2021 [employer]. Care instructions adapted under license by [facility] ([facility]). If you have questions about a medical condition or this instruction, always ask your healthcare professional. [place] [address] any warranty or liability for your use of this information. Flu test in office negative  We will send Covid swab for culture and call with results when available. Continue over-the-counter cough/cold medications as needed for symptoms.   If symptoms worsen or do not improve please follow-up with PCP or return to clinic

## 2021-11-30 NOTE — PROGRESS NOTES
[facility]  [facility] PHYSICAL MEDICINE AND REHABILITATION  [address]  [place] [address]  Dept: [phone]  Dept Fax: [phone]    EMG/NCS Left Upper Limb and Left Lower Limb    Subjective:     Juan Duncan is a 45y.o.-year-old right-handed female presenting with numbness in the left hand and left lower limb. She localizes the numbness to the to the fingertips of the left hand and notes that it has been present for about 3-4 months. She denies any pain or weakness of the left hand. She reports cervical stenosis at C5-C6 for which she has not had surgery. She reports neck pain but states that it is manageable at this time. She denies any symptoms in the right upper limb. She localizes the numbness to the entire left foot and up to the distal left lower limb for about 6 months. She states that sometimes this area can have hot, cold, or painful sensations. She also notes shooting pain in the bilateral feet up to the mid-calf, left worse than right, which she attributes to neuropathy. She states that she has had some numbness of the 5th digit of the right foot for a couple of weeks. She denies any weakness in the left lower limb. She does note back pain, which she states is manageable at this time. PMH:  +type 2 diabetes, no thyroid disease    PSH:  no neck surgery, no hand surgery, no back surgery    Objective:     Physical Exam    Constitutional: She appears well-developed and well-nourished. In no distress. Pulmonary/Chest: Respirations WNL and unlabored. Neurological: She is alert. Sensation to light touch decreased in the 1st digit of the left hand compared to the right. Sensation to light touch decreased at the medial and lateral ankle and in the dorsum of the foot on the left. Strength 5/5 in all limbs. Negative Chun's reflex bilaterally. Negative Tinel sign at the left wrist and left medial elbow.   No clonus with passive ankle dorsiflexion bilaterally. Skin: Skin is warm and dry with good turgor. Imaging  MRI cervical spine, 6/16/19: IMPRESSION:   1. Diffuse degenerative posterior disc bulge at C5-C6 with mild central canal narrowing. 2. Small central disc protrusion at C6-C7 with some arthritic spurring of the left uncovertebral joint. There is mild left foraminal encroachment. Lumbar and sacral spine x-rays, 12/13/18:  FINDINGS: Vertebral body heights and alignment and interspacing are within normal limits.   There are no fractures identified.  Mild anterior osteophytes are noted. There is disc height preservation. No evidence of spondylolysis or spondylolisthesis. Paravertebral soft tissues are unremarkable.  The sacroiliac joints have a symmetrical appearance. IMPRESSION:   No significant arthritic change, acute fracture or subluxation. Findings:     The procedure was explained to the patient prior to beginning the test.  Risks and benefits of the procedure were discussed. Patient gave verbal consent to proceed. The left median sensory studies to digits 1 and 3 are normal.  The left radial sensory study to digit 1 is normal.  The left ulnar sensory study to digit 5 is normal.  The left median motor study to APB is normal.  The left ulnar motor study to ADM is normal.    The left sural sensory study shows prolonged latency but normal amplitude. The left peroneal/fibular motor study to EDB shows normal latency with borderline normal amplitude and normal conduction velocity. The left tibial motor study to AHB is normal.    EMG of selected muscles of the left upper limb is normal.    EMG of selected muscles of the left lower limb is normal.    Impression:     1. Abnormal electrodiagnostic study.   2. There is prolonged latency of the left sural sensory study and borderline normal amplitude of the left peroneal/fibular motor study to EDB, which could be suggestive of a sensorimotor polyneuropathy; however, further investigation with comparison to the right lower limb would provide more information. 3. There is no evidence of left median neuropathy, ulnar neuropathy, cervical radiculopathy, plexopathy, or myopathy. 4. There is no evidence of left lumbosacral radiculopathy, plexopathy, or myopathy. Please see separate document with nerve conduction and EMG tables.       Electronically signed by Adrienne Lomax MD on 11/29/2021 at 11:57 PM

## 2021-11-30 NOTE — PROGRESS NOTES
[facility] [address] [facility] [place] [facility]  [address] [address]  Dept: [phone]  Dept Fax: [phone]    Geovanna Luna is a 45 y.o. female who presents today for her medical conditions/complaintsas noted below. Geovanna Luna is c/o of Covid Testing (pt has been having cough congestion and has been having thick mucus )        HPI:     Cough  This is a new problem. The current episode started in the past 7 days (3 days). The problem has been unchanged. The problem occurs constantly. The cough is non-productive. Associated symptoms include ear pain, a fever (100F), headaches, myalgias, nasal congestion, rhinorrhea, a sore throat and wheezing. Pertinent negatives include no chills or rash. Nothing aggravates the symptoms. Treatments tried: tylenol, dayquil. There is no history of asthma, COPD or environmental allergies.    Past medical history of hypertension, GERD, diabetes  Nephewwas sick recently  Past Medical History:   Diagnosis Date    Chronic deep vein thrombosis (DVT) of proximal vein of left lower extremity (HCC)     DDD (degenerative disc disease), thoracolumbar     Dyslipidemia     Essential hypertension     Gastroesophageal reflux disease without esophagitis     Mood disorder (HCC)     Neuropathy     Trigeminal neuralgia     Type 2 diabetes mellitus with hyperglycemia, with long-term current use of insulin ([facility])     Past medical history reviewed and pertinent positives/negatives in the HPI    Past Surgical History:   Procedure Laterality Date    CATARACT REMOVAL WITH IMPLANT      CHOLECYSTECTOMY         Family History   Problem Relation Age of Onset    COPD Mother     Other Mother         PAD    Diabetes type 2  Father        Social History     Tobacco Use    Smoking status: Former Smoker     Packs/day: 1.00     Years: 15.00     Pack years: 15.00     Start date:      Quit date: 2019     Years since quittin.2    Smokeless tobacco: Never Used    Tobacco comment: estimated quit date   Substance Use Topics    Alcohol use: Never      Current Outpatient Medications   Medication Sig Dispense Refill    busPIRone (BUSPAR) 30 MG tablet Take 30 mg by mouth 3 times daily 90 tablet 0    mometasone (ELOCON) 0.1 % cream Apply topically daily. 50 g 0    lisinopril (PRINIVIL;ZESTRIL) 20 MG tablet TAKE ONE TABLET BY MOUTH DAILY 90 tablet 0    pravastatin (PRAVACHOL) 20 MG tablet Take 1 tablet by mouth daily 90 tablet 0    Semaglutide,0.25 or 0.5MG/DOS, (OZEMPIC, 0.25 OR 0.5 MG/DOSE,) 2 MG/1.5ML SOPN Inject 0.5 mg weekly 1 pen 0    insulin glargine (BASAGLAR KWIKPEN) 100 UNIT/ML injection pen Inject 30 Units into the skin every evening 15 pen 2    gabapentin (NEURONTIN) 300 MG capsule Take 1 capsule by mouth 3 times daily for 90 days. 270 capsule 0    glimepiride (AMARYL) 2 MG tablet Take 1 tablet by mouth daily (with breakfast) 90 tablet 0    aspirin 81 MG chewable tablet Take 81 mg by mouth daily      dicyclomine (BENTYL) 20 MG tablet Take 1 tablet by mouth 4 times daily 360 tablet 0     No current facility-administered medications for this visit. Allergies   Allergen Reactions    Latex Hives and Rash     After prolonged contact    After prolonged contact      Amoxicillin Hives    Atorvastatin Other (See Comments)     Stomach pain  Other reaction(s):  Other (See Comments), Other (See Comments)  Stomach pain  Stomach pain      Pineapple Itching       Health Maintenance   Topic Date Due    Diabetic foot exam  Never done    Diabetic retinal exam  Never done    Hepatitis B vaccine (1 of 3 - Risk 3-dose series) Never done    Cervical cancer screen  Never done    DTaP/Tdap/Td vaccine (2 - Td or Tdap) 12/06/2021    Shingles Vaccine (2 of 2) 12/27/2021    A1C test (Diabetic or Prediabetic)  09/22/2022    Diabetic microalbuminuria test  09/22/2022    Lipid screen  09/22/2022    Potassium monitoring  09/22/2022    Creatinine monitoring  09/22/2022    Breast cancer screen  10/22/2023    Colon cancer screen fecal DNA test (Cologuard)  10/06/2024    Pneumococcal 0-64 years Vaccine (2 of 2 - PPSV23) 07/08/2034    Flu vaccine  Completed    COVID-19 Vaccine  Completed    Hepatitis C screen  Completed    HIV screen  Completed    Hepatitis A vaccine  Aged Out    Hib vaccine  Aged Out    Meningococcal (ACWY) vaccine  Aged Out       :      Review of Systems   Constitutional: Positive for fever (100F). Negative for chills. HENT: Positive for congestion, ear pain, rhinorrhea and sore throat. Respiratory: Positive for cough and wheezing. Gastrointestinal: Positive for nausea. Negative for abdominal pain, diarrhea and vomiting. Musculoskeletal: Positive for myalgias. Skin: Negative for pallor and rash. Allergic/Immunologic: Negative for environmental allergies. Neurological: Positive for headaches. Hematological: Negative for adenopathy. Objective:     Physical Exam  Vitals and nursing note reviewed. Constitutional:       Appearance: Normal appearance. She is obese. HENT:      Head: Normocephalic and atraumatic. Right Ear: Hearing normal.      Left Ear: Hearing normal.      Nose: Nose normal.      Mouth/Throat:      Lips: Pink. Mouth: Mucous membranes are moist.      Pharynx: Oropharynx is clear. Eyes:      Extraocular Movements: Extraocular movements intact. Conjunctiva/sclera: Conjunctivae normal.   Cardiovascular:      Rate and Rhythm: Normal rate and regular rhythm. Heart sounds: Normal heart sounds. Pulmonary:      Effort: Pulmonary effort is normal.      Breath sounds: Normal breath sounds. Musculoskeletal:      Cervical back: Normal range of motion. No muscular tenderness. Skin:     General: Skin is warm and dry. Neurological:      Mental Status: She is alert and oriented to person, place, and time. Mental status is at baseline.    Psychiatric:         Mood and Affect: Mood normal.         Behavior: Behavior normal.         Thought Content: Thought content normal.         Judgment: Judgment normal.       BP (!) 86/56 (Site: Left Upper Arm, Position: Sitting, Cuff Size: Large Adult)   Pulse 83   Temp 97 °F (36.1 °C) (Temporal)   Ht 5' 6\" (1.676 m)   Wt 239 lb (108.4 kg)   SpO2 98%   Breastfeeding No   BMI 38.58 kg/m²     Assessment:       Diagnosis Orders   1. Suspected COVID-19 virus infection  COVID-19   2. Flu-like symptoms  POCT Influenza A/B       Plan:    Flu test in office negative  We will send Covid swab for culture and call with results when available. Continue over-the-counter cough/cold medications as needed for symptoms. If symptoms worsen or do not improve please follow-up with PCP or return to clinic    Orders Placed This Encounter   Procedures    COVID-19     Standing Status:   Future     Standing Expiration Date:   11/30/2022     Scheduling Instructions:      1) Due to current limited availability of the COVID-19 test, tests will be prioritized based on responses to questions above. Testing may be delayed due to volume. 2) Print and instruct patient to adhere to CDC home isolation program. (Link Above)              3) Set up or refer patient for a monitoring program.              4) Have patient sign up for and leverage [employer] (if not previously done). Order Specific Question:   Is this test for diagnosis or screening? Answer:   Diagnosis of ill patient     Order Specific Question:   Symptomatic for COVID-19 as defined by CDC? Answer:   Yes     Order Specific Question:   Date of Symptom Onset     Answer:   11/27/2021     Order Specific Question:   Hospitalized for COVID-19? Answer:   No     Order Specific Question:   Admitted to ICU for COVID-19? Answer:   No     Order Specific Question:   Employed in healthcare setting? Answer:   Unknown     Order Specific Question:   Resident in a congregate (group) care setting? Answer:   Unknown     Order Specific Question:   Pregnant? Answer:   No     Order Specific Question:   Previously tested for COVID-19? Answer:   Unknown    POCT Influenza A/B     No orders of the defined types were placed in this encounter. Patient given educational materials - see patient instructions. Discussed use, benefit, and side effects of prescribed medications. All patient questions answered. Pt voiced understanding. Follow up as directed.      Electronicallysigned by Mitzi Murray MD on 11/30/2021 at 1:40 PM

## 2021-12-01 DIAGNOSIS — Z20.822 SUSPECTED COVID-19 VIRUS INFECTION: ICD-10-CM

## 2021-12-03 ENCOUNTER — VIRTUAL VISIT (OUTPATIENT)
Dept: FAMILY MEDICINE CLINIC | Age: 51
End: 2021-12-03
Payer: COMMERCIAL

## 2021-12-03 ENCOUNTER — HOSPITAL ENCOUNTER (OUTPATIENT)
Dept: VASCULAR LAB | Age: 51
Discharge: HOME | End: 2021-12-03
Payer: COMMERCIAL

## 2021-12-03 DIAGNOSIS — E78.5 DYSLIPIDEMIA: ICD-10-CM

## 2021-12-03 DIAGNOSIS — E11.65 TYPE 2 DIABETES MELLITUS WITH HYPERGLYCEMIA, WITH LONG-TERM CURRENT USE OF INSULIN (HCC): ICD-10-CM

## 2021-12-03 DIAGNOSIS — R05.9 COUGH: Primary | ICD-10-CM

## 2021-12-03 DIAGNOSIS — Z79.4 TYPE 2 DIABETES MELLITUS WITH HYPERGLYCEMIA, WITH LONG-TERM CURRENT USE OF INSULIN (HCC): ICD-10-CM

## 2021-12-03 DIAGNOSIS — R20.9 BILATERAL COLD FEET: ICD-10-CM

## 2021-12-03 LAB
SARS-COV-2: NORMAL
SARS-COV-2: NOT DETECTED
SOURCE: NORMAL

## 2021-12-03 PROCEDURE — 99442 PR PHYS/QHP TELEPHONE EVALUATION 11-20 MIN: CPT | Performed by: FAMILY MEDICINE

## 2021-12-03 PROCEDURE — 93923 UPR/LXTR ART STDY 3+ LVLS: CPT

## 2021-12-03 RX ORDER — DOXYCYCLINE HYCLATE 100 MG
100 TABLET ORAL 2 TIMES DAILY
Qty: 20 TABLET | Refills: 0 | Status: ON HOLD
Start: 2021-12-03 | End: 2021-12-15 | Stop reason: HOSPADM

## 2021-12-03 RX ORDER — PRAVASTATIN SODIUM 20 MG
20 TABLET ORAL DAILY
Qty: 90 TABLET | Refills: 0 | Status: SHIPPED | OUTPATIENT
Start: 2021-12-03 | End: 2022-01-27

## 2021-12-03 RX ORDER — PREDNISONE 10 MG/1
10 TABLET ORAL DAILY
Qty: 5 TABLET | Refills: 0 | Status: SHIPPED | OUTPATIENT
Start: 2021-12-03 | End: 2021-12-08

## 2021-12-03 RX ORDER — BENZONATATE 200 MG/1
200 CAPSULE ORAL 3 TIMES DAILY PRN
Qty: 30 CAPSULE | Refills: 0 | Status: SHIPPED | OUTPATIENT
Start: 2021-12-03 | End: 2021-12-10

## 2021-12-03 RX ORDER — GUAIFENESIN AND CODEINE PHOSPHATE 100; 10 MG/5ML; MG/5ML
10 SOLUTION ORAL NIGHTLY PRN
Qty: 70 ML | Refills: 0 | Status: SHIPPED | OUTPATIENT
Start: 2021-12-03 | End: 2021-12-10

## 2021-12-03 RX ORDER — SEMAGLUTIDE 1.34 MG/ML
INJECTION, SOLUTION SUBCUTANEOUS
Qty: 3 PEN | Refills: 1 | Status: SHIPPED | OUTPATIENT
Start: 2021-12-03 | End: 2022-04-07

## 2021-12-03 NOTE — PROGRESS NOTES
Final Anesthesia Post-op Assessment    Patient: Roberto Hebert  Procedure(s) Performed: COLONOSCOPY- DIABETIC  //  DO NO MOVE PROCEDURE TIME (0800)  Anesthesia type: Monitor Anesthesia Care    Vital Last Value   Temperature 36.7 °C (98 °F) (03/31/17 0703)   Pulse 65 (03/31/17 0848)   Respiratory Rate 16 (03/31/17 0848)   Non-Invasive   Blood Pressure 97/57 (03/31/17 0848)   Arterial  Blood Pressure     Pulse Oximetry 98 % (03/31/17 0848)     Last 24 I/O:   Intake/Output Summary (Last 24 hours) at 03/31/17 0852  Last data filed at 03/31/17 0848   Gross per 24 hour   Intake              600 ml   Output                0 ml   Net              600 ml       PATIENT LOCATION: Phase II  POST-OP VITAL SIGNS: stable  LEVEL OF CONSCIOUSNESS: sedated  RESPIRATORY STATUS: spontaneous ventilation  CARDIOVASCULAR: blood pressure returned to baseline  HYDRATION: euvolemic    NAUSEA: None  AIRWAY PATENCY: patent  POST-OP ASSESSMENT: no complications and patient tolerated procedure well with no complications  COMPLICATIONS: none  HANDOFF:  Handoff to receiving nurse was performed and questions were answered       Kate De Leon is a 45 y.o. female evaluated via telephone on 12/3/2021. Consent:  She and/or health care decision maker is aware that that she may receive a bill for this telephone service, depending on her insurance coverage, and has provided verbal consent to proceed: Yes    Chief Complaint   Patient presents with    Pharyngitis    Cough         Documentation:  I communicated with the patient and/or health care decision maker about cough and sore throat. Details of this discussion including any medical advice provided: Patient states for the past 7 days patient has had worsening cough with production, sore throat, and fatigue. Patient states she did go to  and was flu/covid neg. Patient states she only received symptomatic tx at [facility] and still feels like her symptoms are worsening. Patient denies f/c. Patient states OTC therapy is not helping. Mary Saenz was seen today for pharyngitis and cough. Diagnoses and all orders for this visit:    Cough  -     doxycycline hyclate (VIBRA-TABS) 100 MG tablet; Take 1 tablet by mouth 2 times daily for 10 days  -     predniSONE (DELTASONE) 10 MG tablet; Take 1 tablet by mouth daily for 5 days  -     benzonatate (TESSALON) 200 MG capsule; Take 1 capsule by mouth 3 times daily as needed for Cough  -     guaiFENesin-codeine (TUSSI-ORGANIDIN NR) 100-10 MG/5ML syrup; Take 10 mLs by mouth nightly as needed for Cough for up to 7 days. Rx as above. I affirm this is a Patient Initiated Episode with a Patient who has not had a related appointment within my department in the past 7 days or scheduled within the next 24 hours. Patient identification was verified at the start of the visit: Yes    Total Time: minutes: 11-20 minutes    The visit was conducted pursuant to the emergency declaration under the [address], [address] [place] [address] authority and the [employer] Act.   Patient identification was verified, and a caregiver was present when appropriate. The patient was located in a state where the provider was credentialed to provide care. Note: not billable if this call serves to triage the patient into an appointment for the relevant concern      Shikha Arce, DO       Return if symptoms worsen or fail to improve.

## 2021-12-03 NOTE — TELEPHONE ENCOUNTER
William Bolivar is calling to request a refill on the following medication(s):    Medication Request:  Requested Prescriptions     Pending Prescriptions Disp Refills    Semaglutide,0.25 or 0.5MG/DOS, (OZEMPIC, 0.25 OR 0.5 MG/DOSE,) 2 MG/1.5ML SOPN [Pharmacy Med Name: OZEMPIC 0.25-0.5 MG/DOSE PEN]       Sig: DIAL AND INJECT UNDER THE SKIN 0.25 MG WEEKLY    pravastatin (PRAVACHOL) 20 MG tablet 90 tablet 0     Sig: Take 1 tablet by mouth daily       Last Visit Date (If Applicable):  24/77/4335    Next Visit Date:    12/3/2021

## 2021-12-04 DIAGNOSIS — I73.9 PAD (PERIPHERAL ARTERY DISEASE) (HCC): Primary | ICD-10-CM

## 2021-12-06 DIAGNOSIS — R94.131 ABNORMAL EMG: Primary | ICD-10-CM

## 2021-12-07 ENCOUNTER — TELEPHONE (OUTPATIENT)
Dept: FAMILY MEDICINE CLINIC | Age: 51
End: 2021-12-07

## 2021-12-07 NOTE — TELEPHONE ENCOUNTER
----- [facility] sent at 12/7/2021  1:43 PM EST -----  Subject: Message to Provider    QUESTIONS  Information for Provider? PtMinna Baird calling for her Test   Results  ---------------------------------------------------------------------------  --------------  CALL BACK INFO  What is the best way for the office to contact you? OK to leave message on   voicemail  Preferred Call Back Phone Number? [phone]  ---------------------------------------------------------------------------  --------------  SCRIPT ANSWERS  Relationship to Patient?  Self

## 2021-12-12 ENCOUNTER — APPOINTMENT (OUTPATIENT)
Dept: CT IMAGING | Facility: CLINIC | Age: 51
DRG: 684 | End: 2021-12-12
Payer: COMMERCIAL

## 2021-12-12 ENCOUNTER — HOSPITAL ENCOUNTER (INPATIENT)
Age: 51
LOS: 3 days | Discharge: HOME | DRG: 683 | End: 2021-12-15
Attending: RADIOLOGY | Admitting: STUDENT IN AN ORGANIZED HEALTH CARE EDUCATION/TRAINING PROGRAM
Payer: COMMERCIAL

## 2021-12-12 ENCOUNTER — HOSPITAL ENCOUNTER (INPATIENT)
Facility: CLINIC | Age: 51
LOS: 1 days | Discharge: ANOTHER HOSPITAL | DRG: 684 | End: 2021-12-12
Attending: EMERGENCY MEDICINE | Admitting: INTERNAL MEDICINE
Payer: COMMERCIAL

## 2021-12-12 ENCOUNTER — APPOINTMENT (OUTPATIENT)
Dept: GENERAL RADIOLOGY | Facility: CLINIC | Age: 51
DRG: 684 | End: 2021-12-12
Payer: COMMERCIAL

## 2021-12-12 VITALS
TEMPERATURE: 97.7 F | HEART RATE: 95 BPM | DIASTOLIC BLOOD PRESSURE: 57 MMHG | BODY MASS INDEX: 36.65 KG/M2 | HEIGHT: 65 IN | RESPIRATION RATE: 20 BRPM | WEIGHT: 220 LBS | SYSTOLIC BLOOD PRESSURE: 126 MMHG | OXYGEN SATURATION: 99 %

## 2021-12-12 DIAGNOSIS — N17.9 AKI (ACUTE KIDNEY INJURY) (HCC): Primary | ICD-10-CM

## 2021-12-12 LAB
-: ABNORMAL
ABSOLUTE EOS #: 0.1 K/UL (ref 0–0.4)
ABSOLUTE IMMATURE GRANULOCYTE: ABNORMAL K/UL (ref 0–0.3)
ABSOLUTE LYMPH #: 3.7 K/UL (ref 1–4.8)
ABSOLUTE MONO #: 0.7 K/UL (ref 0.1–1.2)
ALBUMIN SERPL-MCNC: 4.4 G/DL (ref 3.5–5.2)
ALBUMIN/GLOBULIN RATIO: 2.3 (ref 1–2.5)
ALP BLD-CCNC: 75 U/L (ref 35–104)
ALT SERPL-CCNC: 22 U/L (ref 5–33)
AMORPHOUS: ABNORMAL
ANION GAP SERPL CALCULATED.3IONS-SCNC: 12 MMOL/L (ref 9–17)
ANION GAP SERPL CALCULATED.3IONS-SCNC: 17 MMOL/L (ref 9–17)
AST SERPL-CCNC: 18 U/L
BACTERIA: ABNORMAL
BASOPHILS # BLD: 2 % (ref 0–2)
BASOPHILS ABSOLUTE: 0.2 K/UL (ref 0–0.2)
BILIRUB SERPL-MCNC: 0.3 MG/DL (ref 0.3–1.2)
BILIRUBIN URINE: NEGATIVE
BUN BLDV-MCNC: 55 MG/DL (ref 6–20)
BUN BLDV-MCNC: 58 MG/DL (ref 6–20)
BUN/CREAT BLD: ABNORMAL (ref 9–20)
BUN/CREAT BLD: ABNORMAL (ref 9–20)
CALCIUM SERPL-MCNC: 11.6 MG/DL (ref 8.6–10.4)
CALCIUM SERPL-MCNC: 9.9 MG/DL (ref 8.6–10.4)
CASTS UA: ABNORMAL /LPF (ref 0–2)
CHLORIDE BLD-SCNC: 103 MMOL/L (ref 98–107)
CHLORIDE BLD-SCNC: 96 MMOL/L (ref 98–107)
CO2: 23 MMOL/L (ref 20–31)
CO2: 24 MMOL/L (ref 20–31)
COLOR: YELLOW
COMMENT UA: ABNORMAL
CREAT SERPL-MCNC: 2 MG/DL (ref 0.5–0.9)
CREAT SERPL-MCNC: 2.4 MG/DL (ref 0.5–0.9)
CRYSTALS, UA: ABNORMAL /HPF
D-DIMER QUANTITATIVE: 0.6 MG/L FEU
DIFFERENTIAL TYPE: ABNORMAL
DIRECT EXAM: NORMAL
EOSINOPHILS RELATIVE PERCENT: 1 % (ref 1–4)
EPITHELIAL CELLS UA: ABNORMAL /HPF (ref 0–5)
GFR AFRICAN AMERICAN: 26 ML/MIN
GFR AFRICAN AMERICAN: 32 ML/MIN
GFR NON-AFRICAN AMERICAN: 21 ML/MIN
GFR NON-AFRICAN AMERICAN: 26 ML/MIN
GFR SERPL CREATININE-BSD FRML MDRD: ABNORMAL ML/MIN/{1.73_M2}
GLUCOSE BLD-MCNC: 110 MG/DL (ref 70–99)
GLUCOSE BLD-MCNC: 193 MG/DL (ref 70–99)
GLUCOSE URINE: NEGATIVE
HCT VFR BLD CALC: 39.3 % (ref 36–46)
HEMOGLOBIN: 13.2 G/DL (ref 12–16)
IMMATURE GRANULOCYTES: ABNORMAL %
KETONES, URINE: NEGATIVE
LACTIC ACID: 2.4 MMOL/L (ref 0.5–2.2)
LEUKOCYTE ESTERASE, URINE: NEGATIVE
LYMPHOCYTES # BLD: 32 % (ref 24–44)
Lab: NORMAL
MCH RBC QN AUTO: 30.1 PG (ref 26–34)
MCHC RBC AUTO-ENTMCNC: 33.7 G/DL (ref 31–37)
MCV RBC AUTO: 89.4 FL (ref 80–100)
MONOCYTES # BLD: 6 % (ref 2–11)
MUCUS: ABNORMAL
NITRITE, URINE: NEGATIVE
NRBC AUTOMATED: ABNORMAL PER 100 WBC
OTHER OBSERVATIONS UA: ABNORMAL
PDW BLD-RTO: 13.2 % (ref 12.5–15.4)
PH UA: 5 (ref 5–8)
PLATELET # BLD: 411 K/UL (ref 140–450)
PLATELET ESTIMATE: ABNORMAL
PMV BLD AUTO: 7.7 FL (ref 6–12)
POTASSIUM SERPL-SCNC: 4.5 MMOL/L (ref 3.7–5.3)
POTASSIUM SERPL-SCNC: 4.6 MMOL/L (ref 3.7–5.3)
PROTEIN UA: NEGATIVE
RBC # BLD: 4.4 M/UL (ref 4–5.2)
RBC # BLD: ABNORMAL 10*6/UL
RBC UA: ABNORMAL /HPF (ref 0–2)
RENAL EPITHELIAL, UA: ABNORMAL /HPF
SARS-COV-2, RAPID: NOT DETECTED
SEG NEUTROPHILS: 59 % (ref 36–66)
SEGMENTED NEUTROPHILS ABSOLUTE COUNT: 6.9 K/UL (ref 1.8–7.7)
SODIUM BLD-SCNC: 136 MMOL/L (ref 135–144)
SODIUM BLD-SCNC: 139 MMOL/L (ref 135–144)
SPECIFIC GRAVITY UA: 1.02 (ref 1–1.03)
SPECIMEN DESCRIPTION: NORMAL
SPECIMEN DESCRIPTION: NORMAL
TOTAL PROTEIN: 6.3 G/DL (ref 6.4–8.3)
TRICHOMONAS: ABNORMAL
TROPONIN INTERP: NORMAL
TROPONIN T: NORMAL NG/ML
TROPONIN, HIGH SENSITIVITY: 8 NG/L (ref 0–14)
TURBIDITY: ABNORMAL
URINE HGB: NEGATIVE
UROBILINOGEN, URINE: NORMAL
WBC # BLD: 11.6 K/UL (ref 3.5–11)
WBC # BLD: ABNORMAL 10*3/UL
WBC UA: ABNORMAL /HPF (ref 0–5)
YEAST: ABNORMAL

## 2021-12-12 PROCEDURE — 1200000000 HC SEMI PRIVATE

## 2021-12-12 PROCEDURE — 87804 INFLUENZA ASSAY W/OPTIC: CPT

## 2021-12-12 PROCEDURE — 2580000003 HC RX 258: Performed by: INTERNAL MEDICINE

## 2021-12-12 PROCEDURE — 2580000003 HC RX 258: Performed by: REGISTERED NURSE

## 2021-12-12 PROCEDURE — 87040 BLOOD CULTURE FOR BACTERIA: CPT

## 2021-12-12 PROCEDURE — 6370000000 HC RX 637 (ALT 250 FOR IP): Performed by: INTERNAL MEDICINE

## 2021-12-12 PROCEDURE — 96374 THER/PROPH/DIAG INJ IV PUSH: CPT

## 2021-12-12 PROCEDURE — 99285 EMERGENCY DEPT VISIT HI MDM: CPT

## 2021-12-12 PROCEDURE — 83605 ASSAY OF LACTIC ACID: CPT

## 2021-12-12 PROCEDURE — 85379 FIBRIN DEGRADATION QUANT: CPT

## 2021-12-12 PROCEDURE — 81001 URINALYSIS AUTO W/SCOPE: CPT

## 2021-12-12 PROCEDURE — 87635 SARS-COV-2 COVID-19 AMP PRB: CPT

## 2021-12-12 PROCEDURE — 1210000000 HC MED SURG R&B

## 2021-12-12 PROCEDURE — 6360000002 HC RX W HCPCS: Performed by: INTERNAL MEDICINE

## 2021-12-12 PROCEDURE — 80053 COMPREHEN METABOLIC PANEL: CPT

## 2021-12-12 PROCEDURE — 96361 HYDRATE IV INFUSION ADD-ON: CPT

## 2021-12-12 PROCEDURE — 2580000003 HC RX 258: Performed by: STUDENT IN AN ORGANIZED HEALTH CARE EDUCATION/TRAINING PROGRAM

## 2021-12-12 PROCEDURE — 36415 COLL VENOUS BLD VENIPUNCTURE: CPT

## 2021-12-12 PROCEDURE — 80048 BASIC METABOLIC PNL TOTAL CA: CPT

## 2021-12-12 PROCEDURE — 85025 COMPLETE CBC W/AUTO DIFF WBC: CPT

## 2021-12-12 PROCEDURE — 84484 ASSAY OF TROPONIN QUANT: CPT

## 2021-12-12 PROCEDURE — 93005 ELECTROCARDIOGRAM TRACING: CPT | Performed by: REGISTERED NURSE

## 2021-12-12 PROCEDURE — 71045 X-RAY EXAM CHEST 1 VIEW: CPT

## 2021-12-12 PROCEDURE — 6360000002 HC RX W HCPCS: Performed by: REGISTERED NURSE

## 2021-12-12 RX ORDER — ASPIRIN 81 MG/1
81 TABLET, CHEWABLE ORAL DAILY
Status: DISCONTINUED | OUTPATIENT
Start: 2021-12-13 | End: 2021-12-15 | Stop reason: HOSPADM

## 2021-12-12 RX ORDER — DEXTROSE MONOHYDRATE 25 G/50ML
12.5 INJECTION, SOLUTION INTRAVENOUS PRN
Status: DISCONTINUED | OUTPATIENT
Start: 2021-12-12 | End: 2021-12-15 | Stop reason: HOSPADM

## 2021-12-12 RX ORDER — PRAVASTATIN SODIUM 20 MG
20 TABLET ORAL DAILY
Status: DISCONTINUED | OUTPATIENT
Start: 2021-12-13 | End: 2021-12-15 | Stop reason: HOSPADM

## 2021-12-12 RX ORDER — SODIUM CHLORIDE 0.9 % (FLUSH) 0.9 %
5-40 SYRINGE (ML) INJECTION EVERY 12 HOURS SCHEDULED
Status: CANCELLED | OUTPATIENT
Start: 2021-12-12

## 2021-12-12 RX ORDER — DICYCLOMINE HYDROCHLORIDE 10 MG/1
20 CAPSULE ORAL 4 TIMES DAILY
Status: DISCONTINUED | OUTPATIENT
Start: 2021-12-12 | End: 2021-12-12 | Stop reason: HOSPADM

## 2021-12-12 RX ORDER — ONDANSETRON 4 MG/1
4 TABLET, ORALLY DISINTEGRATING ORAL EVERY 8 HOURS PRN
Status: CANCELLED | OUTPATIENT
Start: 2021-12-12

## 2021-12-12 RX ORDER — DEXTROSE MONOHYDRATE 50 MG/ML
100 INJECTION, SOLUTION INTRAVENOUS PRN
Status: DISCONTINUED | OUTPATIENT
Start: 2021-12-12 | End: 2021-12-15 | Stop reason: HOSPADM

## 2021-12-12 RX ORDER — ONDANSETRON 2 MG/ML
4 INJECTION INTRAMUSCULAR; INTRAVENOUS EVERY 6 HOURS PRN
Status: CANCELLED | OUTPATIENT
Start: 2021-12-12

## 2021-12-12 RX ORDER — SODIUM CHLORIDE 9 MG/ML
INJECTION, SOLUTION INTRAVENOUS CONTINUOUS
Status: DISCONTINUED | OUTPATIENT
Start: 2021-12-12 | End: 2021-12-14

## 2021-12-12 RX ORDER — INSULIN GLARGINE 100 [IU]/ML
30 INJECTION, SOLUTION SUBCUTANEOUS NIGHTLY
Status: DISCONTINUED | OUTPATIENT
Start: 2021-12-12 | End: 2021-12-15 | Stop reason: HOSPADM

## 2021-12-12 RX ORDER — SODIUM CHLORIDE 0.9 % (FLUSH) 0.9 %
5-40 SYRINGE (ML) INJECTION EVERY 12 HOURS SCHEDULED
Status: DISCONTINUED | OUTPATIENT
Start: 2021-12-12 | End: 2021-12-15 | Stop reason: HOSPADM

## 2021-12-12 RX ORDER — POLYETHYLENE GLYCOL 3350 17 G/17G
17 POWDER, FOR SOLUTION ORAL DAILY PRN
Status: DISCONTINUED | OUTPATIENT
Start: 2021-12-12 | End: 2021-12-15 | Stop reason: HOSPADM

## 2021-12-12 RX ORDER — 0.9 % SODIUM CHLORIDE 0.9 %
1000 INTRAVENOUS SOLUTION INTRAVENOUS ONCE
Status: COMPLETED | OUTPATIENT
Start: 2021-12-12 | End: 2021-12-12

## 2021-12-12 RX ORDER — BUSPIRONE HYDROCHLORIDE 15 MG/1
30 TABLET ORAL 3 TIMES DAILY
Status: DISCONTINUED | OUTPATIENT
Start: 2021-12-12 | End: 2021-12-15 | Stop reason: HOSPADM

## 2021-12-12 RX ORDER — ACETAMINOPHEN 650 MG/1
650 SUPPOSITORY RECTAL EVERY 6 HOURS PRN
Status: DISCONTINUED | OUTPATIENT
Start: 2021-12-12 | End: 2021-12-15 | Stop reason: HOSPADM

## 2021-12-12 RX ORDER — ASPIRIN 81 MG/1
81 TABLET, CHEWABLE ORAL DAILY
Status: DISCONTINUED | OUTPATIENT
Start: 2021-12-13 | End: 2021-12-12 | Stop reason: HOSPADM

## 2021-12-12 RX ORDER — ONDANSETRON 4 MG/1
4 TABLET, ORALLY DISINTEGRATING ORAL EVERY 8 HOURS PRN
Status: DISCONTINUED | OUTPATIENT
Start: 2021-12-12 | End: 2021-12-15 | Stop reason: HOSPADM

## 2021-12-12 RX ORDER — ACETAMINOPHEN 325 MG/1
650 TABLET ORAL EVERY 6 HOURS PRN
Status: DISCONTINUED | OUTPATIENT
Start: 2021-12-12 | End: 2021-12-15 | Stop reason: HOSPADM

## 2021-12-12 RX ORDER — SODIUM CHLORIDE 9 MG/ML
25 INJECTION, SOLUTION INTRAVENOUS PRN
Status: DISCONTINUED | OUTPATIENT
Start: 2021-12-12 | End: 2021-12-15 | Stop reason: HOSPADM

## 2021-12-12 RX ORDER — HEPARIN SODIUM 5000 [USP'U]/ML
5000 INJECTION, SOLUTION INTRAVENOUS; SUBCUTANEOUS EVERY 8 HOURS SCHEDULED
Status: DISCONTINUED | OUTPATIENT
Start: 2021-12-12 | End: 2021-12-12 | Stop reason: HOSPADM

## 2021-12-12 RX ORDER — POLYETHYLENE GLYCOL 3350 17 G/17G
17 POWDER, FOR SOLUTION ORAL DAILY PRN
Status: CANCELLED | OUTPATIENT
Start: 2021-12-12

## 2021-12-12 RX ORDER — HEPARIN SODIUM 5000 [USP'U]/ML
5000 INJECTION, SOLUTION INTRAVENOUS; SUBCUTANEOUS EVERY 8 HOURS SCHEDULED
Status: DISCONTINUED | OUTPATIENT
Start: 2021-12-12 | End: 2021-12-15 | Stop reason: HOSPADM

## 2021-12-12 RX ORDER — PRAVASTATIN SODIUM 20 MG
20 TABLET ORAL DAILY
Status: CANCELLED | OUTPATIENT
Start: 2021-12-12

## 2021-12-12 RX ORDER — NICOTINE POLACRILEX 4 MG
15 LOZENGE BUCCAL PRN
Status: DISCONTINUED | OUTPATIENT
Start: 2021-12-12 | End: 2021-12-15 | Stop reason: HOSPADM

## 2021-12-12 RX ORDER — FAMOTIDINE 20 MG/1
20 TABLET, FILM COATED ORAL DAILY
Status: DISCONTINUED | OUTPATIENT
Start: 2021-12-12 | End: 2021-12-12 | Stop reason: HOSPADM

## 2021-12-12 RX ORDER — SODIUM CHLORIDE 9 MG/ML
INJECTION, SOLUTION INTRAVENOUS CONTINUOUS
Status: DISCONTINUED | OUTPATIENT
Start: 2021-12-12 | End: 2021-12-12 | Stop reason: HOSPADM

## 2021-12-12 RX ORDER — SODIUM CHLORIDE 0.9 % (FLUSH) 0.9 %
5-40 SYRINGE (ML) INJECTION PRN
Status: DISCONTINUED | OUTPATIENT
Start: 2021-12-12 | End: 2021-12-15 | Stop reason: HOSPADM

## 2021-12-12 RX ORDER — ACETAMINOPHEN 325 MG/1
650 TABLET ORAL EVERY 6 HOURS PRN
Status: CANCELLED | OUTPATIENT
Start: 2021-12-12

## 2021-12-12 RX ORDER — ACETAMINOPHEN 650 MG/1
650 SUPPOSITORY RECTAL EVERY 6 HOURS PRN
Status: CANCELLED | OUTPATIENT
Start: 2021-12-12

## 2021-12-12 RX ORDER — SODIUM CHLORIDE 9 MG/ML
25 INJECTION, SOLUTION INTRAVENOUS PRN
Status: DISCONTINUED | OUTPATIENT
Start: 2021-12-12 | End: 2021-12-12 | Stop reason: HOSPADM

## 2021-12-12 RX ORDER — DICYCLOMINE HYDROCHLORIDE 10 MG/1
20 CAPSULE ORAL 4 TIMES DAILY
Status: DISCONTINUED | OUTPATIENT
Start: 2021-12-12 | End: 2021-12-15 | Stop reason: HOSPADM

## 2021-12-12 RX ORDER — SODIUM CHLORIDE 0.9 % (FLUSH) 0.9 %
10 SYRINGE (ML) INJECTION PRN
Status: CANCELLED | OUTPATIENT
Start: 2021-12-12

## 2021-12-12 RX ORDER — ONDANSETRON 2 MG/ML
4 INJECTION INTRAMUSCULAR; INTRAVENOUS EVERY 6 HOURS PRN
Status: DISCONTINUED | OUTPATIENT
Start: 2021-12-12 | End: 2021-12-15 | Stop reason: HOSPADM

## 2021-12-12 RX ADMIN — CEFTRIAXONE SODIUM 1000 MG: 1 INJECTION, POWDER, FOR SOLUTION INTRAMUSCULAR; INTRAVENOUS at 16:06

## 2021-12-12 RX ADMIN — HEPARIN SODIUM 5000 UNITS: 5000 INJECTION INTRAVENOUS; SUBCUTANEOUS at 21:27

## 2021-12-12 RX ADMIN — SODIUM CHLORIDE: 9 INJECTION, SOLUTION INTRAVENOUS at 18:51

## 2021-12-12 RX ADMIN — SODIUM CHLORIDE: 9 INJECTION, SOLUTION INTRAVENOUS at 23:24

## 2021-12-12 RX ADMIN — DICYCLOMINE HYDROCHLORIDE 20 MG: 10 CAPSULE ORAL at 21:24

## 2021-12-12 RX ADMIN — SODIUM CHLORIDE 1000 ML: 9 INJECTION, SOLUTION INTRAVENOUS at 16:04

## 2021-12-12 RX ADMIN — FAMOTIDINE 20 MG: 20 TABLET, FILM COATED ORAL at 18:51

## 2021-12-12 RX ADMIN — SODIUM CHLORIDE 1000 ML: 9 INJECTION, SOLUTION INTRAVENOUS at 14:23

## 2021-12-12 RX ADMIN — SODIUM CHLORIDE, PRESERVATIVE FREE 10 ML: 5 INJECTION INTRAVENOUS at 23:24

## 2021-12-12 ASSESSMENT — ENCOUNTER SYMPTOMS
NAUSEA: 1
EYES NEGATIVE: 1
ABDOMINAL PAIN: 1
COUGH: 0
VOMITING: 1
RESPIRATORY NEGATIVE: 1
CHEST TIGHTNESS: 0
WHEEZING: 0
SHORTNESS OF BREATH: 0
STRIDOR: 0

## 2021-12-12 ASSESSMENT — PAIN SCALES - GENERAL: PAINLEVEL_OUTOF10: 0

## 2021-12-12 NOTE — ED PROVIDER NOTES
normal limits   CBC WITH AUTO DIFFERENTIAL - Abnormal; Notable for the following components:    WBC 11.6 (*)     All other components within normal limits   LACTIC ACID - Abnormal; Notable for the following components:    Lactic Acid 2.4 (*)     All other components within normal limits   MICROSCOPIC URINALYSIS - Abnormal; Notable for the following components:    Bacteria, UA MANY (*)     Mucus, UA 3+ (*)     Other Observations UA   (*)     Value: Utilizing a urinalysis as the only screening method to exclude a potential uropathogen can be unreliable in many patient populations. Rapid screening tests are less sensitive than culture and if UTI is a clinical possibility, culture should be considered despite a negative urinalysis. All other components within normal limits   BASIC METABOLIC PANEL - Abnormal; Notable for the following components:    Glucose 110 (*)     BUN 55 (*)     CREATININE 2.00 (*)     GFR Non- 26 (*)     GFR  32 (*)     All other components within normal limits   COVID-19, RAPID   RAPID INFLUENZA A/B ANTIGENS   CULTURE, BLOOD 1   CULTURE, BLOOD 1   D-DIMER, QUANTITATIVE   TROPONIN          XR CHEST PORTABLE (Final result)  Result time 12/12/21 14:25:47  Final result by Enrique Lima DO (12/12/21 14:25:47)                Impression:    No acute process. Narrative:    EXAMINATION:   ONE XRAY VIEW OF THE CHEST     12/12/2021 2:11 pm     COMPARISON:   None. HISTORY:   ORDERING SYSTEM PROVIDED HISTORY: hypotensive   TECHNOLOGIST PROVIDED HISTORY:   hypotensive   Reason for Exam: Hypotension, ? COVID   Relevant Medical/Surgical History: Diabetes     FINDINGS:   The lungs are without acute focal process.  There is no effusion or   pneumothorax. The cardiomediastinal silhouette is without acute process.  The   osseous structures are without acute process.                       PERTINENT ATTENDING PHYSICIAN COMMENTS:    EKG: Emergency physician interpretation: Sinus 83 with no ischemia. Axis 32, , QRS 86, . No old to compare. The patient presents with a low blood pressure and feeling poorly. The patient started feeling poorly on 30 November. She had a negative Covid test and a negative x-ray. She was still not feeling well and saw her PCP who wrote for doxycycline and steroids. She has finished the antibiotics but she is still not feeling better. The patient reports generalized fatigue and has had a little bit of intermittent vomiting and abdominal pain as well as diarrhea. She has been vaccinated against Covid. On exam, the patient has clear lungs but has mild hypotension. She is mentating normally. She has normal distal pulses and no pain to palpation of the abdomen. The patient's work-up does not show any obvious infection but does show acute kidney injury. We have discussed the case with the hospitalist and we will be admitting her to [facility] when a bed becomes available. She has continued to [facility] well and has had no significant change in her vital signs. She is transferred in stable condition.          Junito Beach MD  12/14/21 0800

## 2021-12-12 NOTE — ED NOTES
Pt updated on change in bed status- verbalized understanding.   Pt given diet     [address], RN  12/12/21 4632

## 2021-12-12 NOTE — ED PROVIDER NOTES
[facility]  [address] [facility]  Phone: [phone]        Pt Name: Catarina Moya  MRN: [id number]  [place] 1970  Date of evaluation: 12/12/21    CHIEFCOMPLAINT       Chief Complaint   Patient presents with    Other     Hypotension. Sent by urgent care. HISTORY OF PRESENT ILLNESS (Location/Symptom, Timing/Onset, Context/Setting, Quality, Duration, Modifying Factors, Severity)      Catarina Moya is a 45 y.o. female with no pertinent PMH who presents to the ED via private auto with feeling ill for the last 2 weeks and was seen by her doctor and was prescribed doxycycline for possible pneumonia. She took the medication and has not improved in her symptoms continues to feel ill. She reports fatigue, couple episodes of vomiting, abdominal pain few days ago and episode of diarrhea. She is Covid vaccinated denies any shortness of breath, chest pain, or difficulty breathing. She went to a local urgent care today to be evaluated and was told to come to the ER for evaluation as she is hypotensive. She denies any syncope or loss of consciousness. She states that she does take lisinopril but did not take it today as her blood pressure was low at home. PAST MEDICAL / SURGICAL / SOCIAL / FAMILY HISTORY     PMH:  has a past medical history of Chronic deep vein thrombosis (DVT) of proximal vein of left lower extremity ([address] DDD (degenerative disc disease), thoracolumbar, Dyslipidemia, Essential hypertension, Gastroesophageal reflux disease without esophagitis, Mood disorder ([address] Neuropathy, Trigeminal neuralgia, and Type 2 diabetes mellitus with hyperglycemia, with long-term current use of insulin ([address] Surgical History:  has a past surgical history that includes Cholecystectomy and Cataract removal with implant. Social History:  reports that she quit smoking about 2 years ago. She started smoking about 16 years ago. She has a 15.00 pack-year smoking history. She has never used smokeless tobacco. She reports current drug use. Drug: Marijuana Matthew Jock). She reports that she does not drink alcohol. Family History: She indicated that the status of her mother is unknown. She indicated that the status of her father is unknown.   family history includes COPD in her mother; Diabetes type 2  in her father; Other in her mother. Psychiatric History: None    Allergies: Latex, Amoxicillin, Atorvastatin, and Pineapple    Home Medications:   Prior to Admission medications    Medication Sig Start Date End Date Taking? Authorizing Provider   Semaglutide,0.25 or 0.5MG/DOS, (OZEMPIC, 0.25 OR 0.5 MG/DOSE,) 2 MG/1.5ML SOPN DIAL AND INJECT UNDER THE SKIN 0.25 MG WEEKLY 12/3/21  Yes Fidel Normane, DO   pravastatin (PRAVACHOL) 20 MG tablet Take 1 tablet by mouth daily 12/3/21  Yes Fidel Normane, DO   doxycycline hyclate (VIBRA-TABS) 100 MG tablet Take 1 tablet by mouth 2 times daily for 10 days 12/3/21 12/13/21 Yes Fidel Quirogagle, DO   busPIRone (BUSPAR) 30 MG tablet Take 30 mg by mouth 3 times daily 11/30/21  Yes Fidel Kaur, DO   mometasone (ELOCON) 0.1 % cream Apply topically daily. 11/23/21  Yes Fidel Normane, DO   lisinopril (PRINIVIL;ZESTRIL) 20 MG tablet TAKE ONE TABLET BY MOUTH DAILY 10/7/21  Yes Claudell Roussel, APRN - CNP   insulin glargine Maggyyayo Carson) 100 UNIT/ML injection pen Inject 30 Units into the skin every evening 9/23/21  Yes Fidel Normane, DO   gabapentin (NEURONTIN) 300 MG capsule Take 1 capsule by mouth 3 times daily for 90 days.  9/1/21 12/12/21 Yes Fidel Quirogagle, DO   glimepiride (AMARYL) 2 MG tablet Take 1 tablet by mouth daily (with breakfast) 7/2/21  Yes Fidel Normane, DO   aspirin 81 MG chewable tablet Take 81 mg by mouth daily   Yes Historical Provider, MD   dicyclomine (BENTYL) 20 MG tablet Take 1 tablet by mouth 4 times daily 3/19/21  Yes Fidel Kaur, DO       REVIEW OF SYSTEMS  (2-9 systems for level 4, 10 ormore for level 5)      Review of Systems Constitutional: Positive for fatigue. Negative for chills and fever. HENT: Negative. Eyes: Negative. Respiratory: Negative. Negative for cough, chest tightness, shortness of breath, wheezing and stridor. Cardiovascular: Negative. Negative for chest pain and leg swelling. Gastrointestinal: Positive for abdominal pain, nausea and vomiting. Genitourinary: Negative. Musculoskeletal: Negative. Skin: Negative. Neurological: Positive for headaches. All other systems negative except as marked. PHYSICAL EXAM  (up to 7 for level 4, 8 or more for level 5)      INITIAL VITALS:  height is 5' 5\" (1.651 m) and weight is 99.8 kg (220 lb). Her oral temperature is 97.7 °F (36.5 °C). Her blood pressure is 126/69 and her pulse is 87. Her respiration is 20 and oxygen saturation is 100%. Vital signs reviewed. Physical Exam  Constitutional:       General: She is not in acute distress. Appearance: She is not toxic-appearing. HENT:      Head: Normocephalic and atraumatic. Nose: Nose normal.      Mouth/Throat:      Mouth: Mucous membranes are moist.      Pharynx: Oropharynx is clear. Eyes:      Extraocular Movements: Extraocular movements intact. Conjunctiva/sclera: Conjunctivae normal.      Pupils: Pupils are equal, round, and reactive to light. Cardiovascular:      Rate and Rhythm: Normal rate and regular rhythm. Pulses: Normal pulses. Radial pulses are 2+ on the right side and 2+ on the left side. Heart sounds: Normal heart sounds, S1 normal and S2 normal.   Pulmonary:      Effort: Pulmonary effort is normal.      Breath sounds: Normal breath sounds. Abdominal:      General: Abdomen is flat. Bowel sounds are normal.      Palpations: Abdomen is soft. Musculoskeletal:         General: Normal range of motion. Cervical back: Normal range of motion and neck supple. No rigidity. Right lower leg: No edema. Left lower leg: No edema. Skin:     General: Skin is warm and dry. Capillary Refill: Capillary refill takes less than 2 seconds. Neurological:      General: No focal deficit present. Mental Status: She is alert and oriented to person, place, and time. Mental status is at baseline. Cranial Nerves: No cranial nerve deficit. Sensory: No sensory deficit. Motor: No weakness. Psychiatric:         Mood and Affect: Mood normal.         Behavior: Behavior normal.           DIFFERENTIAL DIAGNOSIS / MDM     After my physical exam, I do have concern for cardiac etiology, possible PE, sepsis, or Covid cause for the patient's symptoms. Will obtain septic work-up along with a cardiac work-up and will obtain rapid Covid test at this time. Provide the patient with a normal saline bolus for hydration and her hypotension. EKG is unremarkable for any acute findings. CBC shows mild leukocytosis at 11.6. Lactic acid is 2.4. Troponin was negative. Covid and influenza swabs were negative. CMP shows acute kidney injury with a BUN of 58, creatinine 2.4, with a GFR of 21. We will administer a second liter normal saline bolus. D-dimer was elevated at 0.6, we are unable to scan for PE due to patient's kidney function. Urine did show many bacteria with 3+ mucus, will treat this with the IV Rocephin. I did page [facility] access for admission to [facility] on hospice, I did speak with Dr. Long Beck which he did accept the patient and recommended that a repeat BMP was obtained at 1700. BMP does show a BUN of 55 with a creatinine 2.0 with a GFR of 26 with some mild improvement from prior lab. Did receive a call back from [facility] stating that he would like to have the patient admitted to [facility] as there is bed placement available at this time. I did discuss admission with Dr. Katrin Georges, hospitalist at [facility] which he did accept. Patient does have a bed at [facility], nursing at this time is arranging transport to their facility. Patient is stable at time of disposition is alert and oriented x4. PLAN (LABS / IMAGING / EKG):  Orders Placed This Encounter   Procedures    COVID-19, Rapid    Rapid Influenza A/B Antigens    Culture, Blood 1    Culture, Blood 1    XR CHEST PORTABLE    Urinalysis Reflex to Culture    Comprehensive Metabolic Panel    CBC Auto Differential    D-Dimer, Quantitative    Lactic Acid    Troponin    Microscopic Urinalysis    Basic Metabolic Panel    EKG 12 Lead    Insert peripheral IV       MEDICATIONS ORDERED:  Orders Placed This Encounter   Medications    0.9 % sodium chloride bolus    0.9 % sodium chloride bolus    cefTRIAXone (ROCEPHIN) 1,000 mg in sterile water 10 mL IV syringe     Order Specific Question:   Antimicrobial Indications     Answer:   Urinary Tract Infection    dicyclomine (BENTYL) capsule 20 mg    0.9 % sodium chloride infusion    0.9 % sodium chloride infusion    famotidine (PEPCID) tablet 20 mg    heparin (porcine) injection 5,000 Units       Controlled Substances Monitoring:     DIAGNOSTIC RESULTS     EKG: All EKG's are interpreted by the Emergency Department Physician who either signs or Co-signs this chart in the absenceof a cardiologist.    EKG is interpreted by myself as normal sinus rhythm with a ventricular rate of 83, IN interval 122, QRS duration 86, QTc 437. There is no acute ST changes or signs of ischemia. RADIOLOGY: All images are read by the radiologist and their interpretations are reviewed. XR CHEST PORTABLE   Final Result   No acute process. LABS:  Results for orders placed or performed during the hospital encounter of 12/12/21   COVID-19, Rapid    Specimen: Nasopharyngeal Swab   Result Value Ref Range    Specimen Description . NASOPHARYNGEAL SWAB     SARS-CoV-2, Rapid Not Detected Not Detected   Rapid Influenza A/B Antigens    Specimen: Nasopharyngeal Swab   Result Value Ref Range    Specimen Description . NASOPHARYNGEAL SWAB     Special Requests NOT REPORTED     Direct Exam       NEGATIVE for Influenza A + B antigens. PCR testing to confirm this result is available upon request.  Specimen will be saved in the laboratory for 7 days. Please call [phone] if PCR testing is indicated.    Urinalysis Reflex to Culture    Specimen: Urine, clean catch   Result Value Ref Range    Color, UA Yellow Yellow    Turbidity UA SLIGHTLY CLOUDY (A) Clear    Glucose, Ur NEGATIVE NEGATIVE    Bilirubin Urine NEGATIVE NEGATIVE    Ketones, Urine NEGATIVE NEGATIVE    Specific Gravity, UA 1.020 1.005 - 1.030    Urine Hgb NEGATIVE NEGATIVE    pH, UA 5.0 5.0 - 8.0    Protein, UA NEGATIVE NEGATIVE    Urobilinogen, Urine Normal Normal    Nitrite, Urine NEGATIVE NEGATIVE    Leukocyte Esterase, Urine NEGATIVE NEGATIVE    Urinalysis Comments NOT REPORTED    Comprehensive Metabolic Panel   Result Value Ref Range    Glucose 193 (H) 70 - 99 mg/dL    BUN 58 (H) 6 - 20 mg/dL    CREATININE 2.40 (H) 0.50 - 0.90 mg/dL    Bun/Cre Ratio NOT REPORTED 9 - 20    Calcium 11.6 (H) 8.6 - 10.4 mg/dL    Sodium 136 135 - 144 mmol/L    Potassium 4.6 3.7 - 5.3 mmol/L    Chloride 96 (L) 98 - 107 mmol/L    CO2 23 20 - 31 mmol/L    Anion Gap 17 9 - 17 mmol/L    Alkaline Phosphatase 75 35 - 104 U/L    ALT 22 5 - 33 U/L    AST 18 <32 U/L    Total Bilirubin 0.30 0.3 - 1.2 mg/dL    Total Protein 6.3 (L) 6.4 - 8.3 g/dL    Albumin 4.4 3.5 - 5.2 g/dL    Albumin/Globulin Ratio 2.3 1.0 - 2.5    GFR Non- 21 (L) >60 mL/min    GFR  26 (L) >60 mL/min    GFR Comment          GFR Staging NOT REPORTED    CBC Auto Differential   Result Value Ref Range    WBC 11.6 (H) 3.5 - 11.0 k/uL    RBC 4.40 4.0 - 5.2 m/uL    Hemoglobin 13.2 12.0 - 16.0 g/dL    Hematocrit 39.3 36 - 46 %    MCV 89.4 80 - 100 fL    MCH 30.1 26 - 34 pg    MCHC 33.7 31 - 37 g/dL    RDW 13.2 12.5 - 15.4 %    Platelets 139 237 - 747 k/uL    MPV 7.7 6.0 - 12.0 fL    NRBC Automated NOT REPORTED per 100 WBC    Differential Type NOT REPORTED     Seg Neutrophils 59 36 - 66 %    Lymphocytes 32 24 - 44 %    Monocytes 6 2 - 11 %    Eosinophils % 1 1 - 4 %    Basophils 2 0 - 2 %    Immature Granulocytes NOT REPORTED 0 %    Segs Absolute 6.90 1.8 - 7.7 k/uL    Absolute Lymph # 3.70 1.0 - 4.8 k/uL    Absolute Mono # 0.70 0.1 - 1.2 k/uL    Absolute Eos # 0.10 0.0 - 0.4 k/uL    Basophils Absolute 0.20 0.0 - 0.2 k/uL    Absolute Immature Granulocyte NOT REPORTED 0.00 - 0.30 k/uL    WBC Morphology NOT REPORTED     RBC Morphology NOT REPORTED     Platelet Estimate NOT REPORTED    D-Dimer, Quantitative   Result Value Ref Range    D-Dimer, Quant 0.60 mg/L FEU   Lactic Acid   Result Value Ref Range    Lactic Acid 2.4 (H) 0.5 - 2.2 mmol/L   Troponin   Result Value Ref Range    Troponin, High Sensitivity 8 0 - 14 ng/L    Troponin T NOT REPORTED <0.03 ng/mL    Troponin Interp NOT REPORTED    Microscopic Urinalysis   Result Value Ref Range    -          WBC, UA 2 TO 5 0 - 5 /HPF    RBC, UA None 0 - 2 /HPF    Casts UA NOT REPORTED 0 - 2 /LPF    Crystals, UA NOT REPORTED None /HPF    Epithelial Cells UA 10 TO 20 0 - 5 /HPF    Renal Epithelial, UA NOT REPORTED 0 /HPF    Bacteria, UA MANY (A) None    Mucus, UA 3+ (A) None    Trichomonas, UA NOT REPORTED None    Amorphous, UA NOT REPORTED None    Other Observations UA (A) NOT REQ. Utilizing a urinalysis as the only screening method to exclude a potential uropathogen can be unreliable in many patient populations. Rapid screening tests are less sensitive than culture and if UTI is a clinical possibility, culture should be considered despite a negative urinalysis.     Yeast, UA NOT REPORTED None   Basic Metabolic Panel   Result Value Ref Range    Glucose 110 (H) 70 - 99 mg/dL    BUN 55 (H) 6 - 20 mg/dL    CREATININE 2.00 (H) 0.50 - 0.90 mg/dL    Bun/Cre Ratio NOT REPORTED 9 - 20    Calcium 9.9 8.6 - 10.4 mg/dL    Sodium 139 135 - 144 mmol/L    Potassium 4.5 3.7 - 5.3 mmol/L    Chloride 103 98 - 107 mmol/L    CO2 24 20 - 31 mmol/L    Anion Gap 12 9 - 17 mmol/L    GFR Non-African American 26 (L) >60 mL/min    GFR  32 (L) >60 mL/min    GFR Comment          GFR Staging NOT REPORTED        EMERGENCY DEPARTMENT COURSE     ED Course as of 12/12/21 2054   Sun Dec 12, 2021   1555 [address] [facility] access for admission to [facility] underneath the hospitalist [TM]   [phone] Discussed admission with Dr. Maria Alejandra Amin, hospitalist at [facility], he does accept the patient and recommends that a repeat BMP be drawn. [TM]   [id number] The received call from [facility] stating that it is recommended that patient go to [facility] due to bed placement, at this time I did speak with Dr. Westley Montero, hospitalist at [facility] which he does accept the patient and the nursing staff will arrange transport at this time. [TM]   2054 Patient is currently stable resting Comfortably with even unlabored breaths. Currently my shift is ended and care is to be assumed by the admitting team. [TM]      ED Course User Index  [TM] MONCHO Swann CNP        Vitals:    Vitals:    12/12/21 1603 12/12/21 1658 12/12/21 1854 12/12/21 1913   BP: (!) 94/58 101/62 126/69 126/69   Pulse: 78 74 86 87   Resp: 20 20 20 20   Temp:       TempSrc:       SpO2: 99% 100% 99% 100%   Weight:       Height:         -------------------------  BP: 126/69, Temp: 97.7 °F (36.5 °C), Pulse: 87, Resp: 20      RE-EVALUATION:  See ED Course notes above. CONSULTS:  None    PROCEDURES:  None    FINAL IMPRESSION      1. JOHN (acute kidney injury) ([facility] [address]          DISPOSITION / PLAN     CONDITION ON DISPOSITION:   Stable     PATIENT REFERRED TO:  No follow-up provider specified.     DISCHARGE MEDICATIONS:  New Prescriptions    No medications on file       MONCHO Swann CNP   Emergency Medicine Nurse Practitioner    (Please note that portions of this note were completed with a voice recognition program.  Efforts were made to edit the dictations but occasionally words aremis-transcribed.)       Pavel Pinedo, APRN - CNP  12/12/21 2055

## 2021-12-12 NOTE — Clinical Note
Patient Class: Inpatient [101]   REQUIRED: Diagnosis: JOHN (acute kidney injury) ([facility] [address] [[id number]]   Estimated Length of Stay: Estimated stay of more than 2 midnights   Admitting Provider: Ry Bond [[id number]]

## 2021-12-13 ENCOUNTER — APPOINTMENT (OUTPATIENT)
Dept: ULTRASOUND IMAGING | Age: 51
DRG: 683 | End: 2021-12-13
Attending: RADIOLOGY
Payer: COMMERCIAL

## 2021-12-13 PROBLEM — R19.7 DIARRHEA OF PRESUMED INFECTIOUS ORIGIN: Status: ACTIVE | Noted: 2021-12-13

## 2021-12-13 LAB
ABSOLUTE EOS #: 0.2 K/UL (ref 0–0.4)
ABSOLUTE IMMATURE GRANULOCYTE: ABNORMAL K/UL (ref 0–0.3)
ABSOLUTE LYMPH #: 3.8 K/UL (ref 1–4.8)
ABSOLUTE MONO #: 0.7 K/UL (ref 0.1–1.2)
ANION GAP SERPL CALCULATED.3IONS-SCNC: 10 MMOL/L (ref 9–17)
ANION GAP SERPL CALCULATED.3IONS-SCNC: 11 MMOL/L (ref 9–17)
BASOPHILS # BLD: 0 % (ref 0–2)
BASOPHILS ABSOLUTE: 0 K/UL (ref 0–0.2)
BUN BLDV-MCNC: 30 MG/DL (ref 6–20)
BUN BLDV-MCNC: 42 MG/DL (ref 6–20)
BUN/CREAT BLD: ABNORMAL (ref 9–20)
BUN/CREAT BLD: ABNORMAL (ref 9–20)
CALCIUM SERPL-MCNC: 8.6 MG/DL (ref 8.6–10.4)
CALCIUM SERPL-MCNC: 8.7 MG/DL (ref 8.6–10.4)
CHLORIDE BLD-SCNC: 108 MMOL/L (ref 98–107)
CHLORIDE BLD-SCNC: 110 MMOL/L (ref 98–107)
CO2: 19 MMOL/L (ref 20–31)
CO2: 22 MMOL/L (ref 20–31)
CREAT SERPL-MCNC: 1.3 MG/DL (ref 0.5–0.9)
CREAT SERPL-MCNC: 1.32 MG/DL (ref 0.5–0.9)
DIFFERENTIAL TYPE: ABNORMAL
EKG ATRIAL RATE: 83 BPM
EKG P AXIS: 72 DEGREES
EKG P-R INTERVAL: 122 MS
EKG Q-T INTERVAL: 372 MS
EKG QRS DURATION: 86 MS
EKG QTC CALCULATION (BAZETT): 437 MS
EKG R AXIS: 32 DEGREES
EKG T AXIS: 34 DEGREES
EKG VENTRICULAR RATE: 83 BPM
EOSINOPHILS RELATIVE PERCENT: 2 % (ref 1–4)
GFR AFRICAN AMERICAN: 51 ML/MIN
GFR AFRICAN AMERICAN: 52 ML/MIN
GFR NON-AFRICAN AMERICAN: 42 ML/MIN
GFR NON-AFRICAN AMERICAN: 43 ML/MIN
GFR SERPL CREATININE-BSD FRML MDRD: ABNORMAL ML/MIN/{1.73_M2}
GLUCOSE BLD-MCNC: 101 MG/DL (ref 65–105)
GLUCOSE BLD-MCNC: 106 MG/DL (ref 70–99)
GLUCOSE BLD-MCNC: 121 MG/DL (ref 65–105)
GLUCOSE BLD-MCNC: 99 MG/DL (ref 70–99)
HCT VFR BLD CALC: 30.9 % (ref 36–46)
HEMOGLOBIN: 10.4 G/DL (ref 12–16)
IMMATURE GRANULOCYTES: ABNORMAL %
LACTIC ACID, SEPSIS WHOLE BLOOD: ABNORMAL MMOL/L (ref 0.5–1.9)
LACTIC ACID, SEPSIS WHOLE BLOOD: NORMAL MMOL/L (ref 0.5–1.9)
LACTIC ACID, SEPSIS: 1.9 MMOL/L (ref 0.5–1.9)
LACTIC ACID, SEPSIS: 2.1 MMOL/L (ref 0.5–1.9)
LYMPHOCYTES # BLD: 43 % (ref 24–44)
MCH RBC QN AUTO: 30.3 PG (ref 26–34)
MCHC RBC AUTO-ENTMCNC: 33.8 G/DL (ref 31–37)
MCV RBC AUTO: 89.7 FL (ref 80–100)
MONOCYTES # BLD: 8 % (ref 2–11)
NRBC AUTOMATED: ABNORMAL PER 100 WBC
PDW BLD-RTO: 13.3 % (ref 12.5–15.4)
PLATELET # BLD: 331 K/UL (ref 140–450)
PLATELET ESTIMATE: ABNORMAL
PMV BLD AUTO: 8.2 FL (ref 6–12)
POTASSIUM SERPL-SCNC: 4.4 MMOL/L (ref 3.7–5.3)
POTASSIUM SERPL-SCNC: 4.6 MMOL/L (ref 3.7–5.3)
RBC # BLD: 3.44 M/UL (ref 4–5.2)
RBC # BLD: ABNORMAL 10*6/UL
SEG NEUTROPHILS: 47 % (ref 36–66)
SEGMENTED NEUTROPHILS ABSOLUTE COUNT: 4.2 K/UL (ref 1.8–7.7)
SODIUM BLD-SCNC: 140 MMOL/L (ref 135–144)
SODIUM BLD-SCNC: 140 MMOL/L (ref 135–144)
WBC # BLD: 8.8 K/UL (ref 3.5–11)
WBC # BLD: ABNORMAL 10*3/UL

## 2021-12-13 PROCEDURE — 85025 COMPLETE CBC W/AUTO DIFF WBC: CPT

## 2021-12-13 PROCEDURE — 87506 IADNA-DNA/RNA PROBE TQ 6-11: CPT

## 2021-12-13 PROCEDURE — 83605 ASSAY OF LACTIC ACID: CPT

## 2021-12-13 PROCEDURE — 2580000003 HC RX 258: Performed by: NURSE PRACTITIONER

## 2021-12-13 PROCEDURE — 2580000003 HC RX 258: Performed by: STUDENT IN AN ORGANIZED HEALTH CARE EDUCATION/TRAINING PROGRAM

## 2021-12-13 PROCEDURE — 6360000002 HC RX W HCPCS: Performed by: STUDENT IN AN ORGANIZED HEALTH CARE EDUCATION/TRAINING PROGRAM

## 2021-12-13 PROCEDURE — 6370000000 HC RX 637 (ALT 250 FOR IP): Performed by: STUDENT IN AN ORGANIZED HEALTH CARE EDUCATION/TRAINING PROGRAM

## 2021-12-13 PROCEDURE — 36415 COLL VENOUS BLD VENIPUNCTURE: CPT

## 2021-12-13 PROCEDURE — 76770 US EXAM ABDO BACK WALL COMP: CPT

## 2021-12-13 PROCEDURE — 99222 1ST HOSP IP/OBS MODERATE 55: CPT | Performed by: STUDENT IN AN ORGANIZED HEALTH CARE EDUCATION/TRAINING PROGRAM

## 2021-12-13 PROCEDURE — 80048 BASIC METABOLIC PNL TOTAL CA: CPT

## 2021-12-13 PROCEDURE — 82947 ASSAY GLUCOSE BLOOD QUANT: CPT

## 2021-12-13 PROCEDURE — 1210000000 HC MED SURG R&B

## 2021-12-13 PROCEDURE — 6370000000 HC RX 637 (ALT 250 FOR IP): Performed by: NURSE PRACTITIONER

## 2021-12-13 RX ORDER — GABAPENTIN 300 MG/1
300 CAPSULE ORAL ONCE
Status: COMPLETED | OUTPATIENT
Start: 2021-12-13 | End: 2021-12-13

## 2021-12-13 RX ORDER — 0.9 % SODIUM CHLORIDE 0.9 %
500 INTRAVENOUS SOLUTION INTRAVENOUS ONCE
Status: COMPLETED | OUTPATIENT
Start: 2021-12-13 | End: 2021-12-13

## 2021-12-13 RX ADMIN — PRAVASTATIN SODIUM 20 MG: 20 TABLET ORAL at 08:47

## 2021-12-13 RX ADMIN — HEPARIN SODIUM 5000 UNITS: 5000 INJECTION INTRAVENOUS; SUBCUTANEOUS at 05:50

## 2021-12-13 RX ADMIN — INSULIN GLARGINE 30 UNITS: 100 INJECTION, SOLUTION SUBCUTANEOUS at 22:05

## 2021-12-13 RX ADMIN — SODIUM CHLORIDE: 9 INJECTION, SOLUTION INTRAVENOUS at 08:00

## 2021-12-13 RX ADMIN — BUSPIRONE HYDROCHLORIDE 30 MG: 15 TABLET ORAL at 13:32

## 2021-12-13 RX ADMIN — BUSPIRONE HYDROCHLORIDE 30 MG: 15 TABLET ORAL at 08:48

## 2021-12-13 RX ADMIN — GABAPENTIN 300 MG: 300 CAPSULE ORAL at 23:28

## 2021-12-13 RX ADMIN — ASPIRIN 81 MG: 81 TABLET, CHEWABLE ORAL at 08:48

## 2021-12-13 RX ADMIN — HEPARIN SODIUM 5000 UNITS: 5000 INJECTION INTRAVENOUS; SUBCUTANEOUS at 20:38

## 2021-12-13 RX ADMIN — INSULIN LISPRO 1 UNITS: 100 INJECTION, SOLUTION INTRAVENOUS; SUBCUTANEOUS at 16:21

## 2021-12-13 RX ADMIN — HEPARIN SODIUM 5000 UNITS: 5000 INJECTION INTRAVENOUS; SUBCUTANEOUS at 13:32

## 2021-12-13 RX ADMIN — DICYCLOMINE HYDROCHLORIDE 20 MG: 10 CAPSULE ORAL at 16:21

## 2021-12-13 RX ADMIN — SODIUM CHLORIDE, PRESERVATIVE FREE 10 ML: 5 INJECTION INTRAVENOUS at 08:47

## 2021-12-13 RX ADMIN — DICYCLOMINE HYDROCHLORIDE 20 MG: 10 CAPSULE ORAL at 13:32

## 2021-12-13 RX ADMIN — SODIUM CHLORIDE: 9 INJECTION, SOLUTION INTRAVENOUS at 19:52

## 2021-12-13 RX ADMIN — DICYCLOMINE HYDROCHLORIDE 20 MG: 10 CAPSULE ORAL at 20:38

## 2021-12-13 RX ADMIN — DICYCLOMINE HYDROCHLORIDE 20 MG: 10 CAPSULE ORAL at 08:47

## 2021-12-13 RX ADMIN — BUSPIRONE HYDROCHLORIDE 30 MG: 15 TABLET ORAL at 20:38

## 2021-12-13 RX ADMIN — SODIUM CHLORIDE 500 ML: 0.9 INJECTION, SOLUTION INTRAVENOUS at 05:00

## 2021-12-13 ASSESSMENT — PAIN SCALES - GENERAL
PAINLEVEL_OUTOF10: 0

## 2021-12-13 NOTE — PROGRESS NOTES
0445 BP 78/52 MAP 60 (automatic cuff)  0452 BP 82/60 (manual cuff)   Pt is AOx4, denies pain and dizziness. ANGELICA Romero notified, 500cc bolus ordered and given. 0550 BP 89/49 MAP 62 (automatic cuff) post 500cc bolus. Pt remains asymptomatic at this time. ANGELICA Romero notified- no new orders.

## 2021-12-13 NOTE — CARE COORDINATION
Case Management Initial Discharge Plan  Juan Jimenez,             Met with:patient to discuss discharge plans. Information verified: address, contacts, phone number, , insurance Yes  Insurance Provider: Silvestre Lennon    Emergency Contact/Next of Kin name & number: Patience Garcia - spouse  Who are involved in patient's support system?     PCP: Keysha Shook DO  Date of last visit: past year      Discharge Planning    Living Arrangements:  Spouse/Significant Other     Home has 2 stories  few stairs to climb to get into front door, 1 flight stairs to climb to reach second floor  Location of bedroom/bathroom in home 2    Patient able to perform ADL's:Independent    Current Services (outpatient & in home) none   DME equipment: has cane and walker - doesn't need  DME provider:     Is patient receiving oral anticoagulation therapy? No    Potential Assistance Needed:  N/A    Patient agreeable to home care: No  [place] of choice provided:  no    Prior SNF/Rehab Placement and Facility: no  Agreeable to SNF/Rehab: No  [place] of choice provided: no     Evaluation: no    Expected Discharge date:  12/15/21    Patient expects to be discharged to: If home: is the family and/or caregiver wiling & able to provide support at home? yes  Who will be providing this support?     Follow Up Appointment: Best Day/ Time: Monday AM    Transportation provider: self, spouse  Transportation arrangements needed for discharge: No    Readmission Risk              Risk of Unplanned Readmission:  15         Does patient have a readmission risk score greater than 14?: Yes  If yes, follow-up appointment must be made within 7 days of discharge.      Goals of Care: manage renal function      Educated patient on transitional options, provided freedom of choice and are agreeable with plan      Discharge Plan: home with spouse, independent          Electronically signed by Kailash Celis RN on 21 at 11:54 AM EST

## 2021-12-13 NOTE — H&P
[facility]  Office: [address], DO, Caleb Guzman, DO, Fredi Diallo, DO, Jo Irvin Blood, DO, Jose Danielle MD, Sonali Gutiérrez MD, Rosio Urban MD, Shanique Whitmore MD, Mario Celeste MD, Marina Aldridge MD, Gera Stauffer MD, Ashish Man, DO, Geno Moya, DO, Lizbeth Moncada MD,  Carolyn Rodriguez DO, Renata Watts MD, Walter Upton MD, John Sanchez MD, Yina Wylie MD, Alton Sanchez MD, Pascual Bonilla MD, Latasha Dumont MD, Janelle Valentine [facility], [facility], CNP, Courtney Bazzi, CNP, Braeden Shay, CNS, Divine Yusuf, CNP, Guy Alarcon, CNP, Russell Coulter, CNP, Sam Corey, CNP, Celine Bar, CNP, Lamont Coe PA-C, Anitha Gómez, [facility], Rose Moses, CNP, Cierra Sherman, CNP, Krupa Mclaughlin, CNP, Connor Holt, CNP, Yola Velazco, CNP, Robert Uribe, CNP, Barbara Pearce, [facility]   [address] [facility]    HISTORY AND PHYSICAL EXAMINATION            Date:   12/13/2021  Patient name:  Prakash Lamb  Date of admission:  12/12/2021 10:59 PM  MRN:   [id number]  Account:  [de-identified]  YOB: 1970  PCP:    Ganesh Hollingsworth DO  Room:   [address] [place] [address]  Code Status:    Full Code    Chief Complaint:     Persistent nausea and diarrhea     History Obtained From:     patient    History of Present Illness:     Prakash Lamb is a 45 y.o. female with a past medical history of DM2, depression and HLD who presented to the emergency department on 12/12/2021 complaining of dizziness, fatigue as well as intractable nausea/vomiting and diarrhea. The patient states that her symptoms began about two-weeks-ago and have progressively worsened. She initially had viral URI symptoms and was tested for COVID and influenza outpatient (both negative). The patient states that she was not improving and called her PCP her placed her on doxycycline for possible pneumonia.  The patient states that she then began to experience nausea/vomiting and 12/12/21  2:00 PM   Result Value Ref Range    D-Dimer, Quant 0.60 mg/L FEU   Lactic Acid    Collection Time: 12/12/21  2:00 PM   Result Value Ref Range    Lactic Acid 2.4 (H) 0.5 - 2.2 mmol/L   Troponin    Collection Time: 12/12/21  2:00 PM   Result Value Ref Range    Troponin, High Sensitivity 8 0 - 14 ng/L    Troponin T NOT REPORTED <0.03 ng/mL    Troponin Interp NOT REPORTED    COVID-19, Rapid    Collection Time: 12/12/21  2:04 PM    Specimen: Nasopharyngeal Swab   Result Value Ref Range    Specimen Description . NASOPHARYNGEAL SWAB     SARS-CoV-2, Rapid Not Detected Not Detected   Rapid Influenza A/B Antigens    Collection Time: 12/12/21  2:04 PM    Specimen: Nasopharyngeal Swab   Result Value Ref Range    Specimen Description . NASOPHARYNGEAL SWAB     Special Requests NOT REPORTED     Direct Exam       NEGATIVE for Influenza A + B antigens. PCR testing to confirm this result is available upon request.  Specimen will be saved in the laboratory for 7 days. Please call [phone] if PCR testing is indicated. Culture, Blood 1    Collection Time: 12/12/21  2:15 PM    Specimen: Blood   Result Value Ref Range    Specimen Description . BLOOD     Special Requests LEFT ANTECUBE 20CC     Culture NO GROWTH <24 HRS    Urinalysis Reflex to Culture    Collection Time: 12/12/21  3:18 PM    Specimen: Urine, clean catch   Result Value Ref Range    Color, UA Yellow Yellow    Turbidity UA SLIGHTLY CLOUDY (A) Clear    Glucose, Ur NEGATIVE NEGATIVE    Bilirubin Urine NEGATIVE NEGATIVE    Ketones, Urine NEGATIVE NEGATIVE    Specific Gravity, UA 1.020 1.005 - 1.030    Urine Hgb NEGATIVE NEGATIVE    pH, UA 5.0 5.0 - 8.0    Protein, UA NEGATIVE NEGATIVE    Urobilinogen, Urine Normal Normal    Nitrite, Urine NEGATIVE NEGATIVE    Leukocyte Esterase, Urine NEGATIVE NEGATIVE    Urinalysis Comments NOT REPORTED    Microscopic Urinalysis    Collection Time: 12/12/21  3:18 PM   Result Value Ref Range    - WBC, UA 2 TO 5 0 - 5 /HPF    RBC, UA None 0 - 2 /HPF    Casts UA NOT REPORTED 0 - 2 /LPF    Crystals, UA NOT REPORTED None /HPF    Epithelial Cells UA 10 TO 20 0 - 5 /HPF    Renal Epithelial, UA NOT REPORTED 0 /HPF    Bacteria, UA MANY (A) None    Mucus, UA 3+ (A) None    Trichomonas, UA NOT REPORTED None    Amorphous, UA NOT REPORTED None    Other Observations UA (A) NOT REQ. Utilizing a urinalysis as the only screening method to exclude a potential uropathogen can be unreliable in many patient populations. Rapid screening tests are less sensitive than culture and if UTI is a clinical possibility, culture should be considered despite a negative urinalysis.     Yeast, UA NOT REPORTED None   Basic Metabolic Panel    Collection Time: 12/12/21  5:05 PM   Result Value Ref Range    Glucose 110 (H) 70 - 99 mg/dL    BUN 55 (H) 6 - 20 mg/dL    CREATININE 2.00 (H) 0.50 - 0.90 mg/dL    Bun/Cre Ratio NOT REPORTED 9 - 20    Calcium 9.9 8.6 - 10.4 mg/dL    Sodium 139 135 - 144 mmol/L    Potassium 4.5 3.7 - 5.3 mmol/L    Chloride 103 98 - 107 mmol/L    CO2 24 20 - 31 mmol/L    Anion Gap 12 9 - 17 mmol/L    GFR Non-African American 26 (L) >60 mL/min    GFR  32 (L) >60 mL/min    GFR Comment          GFR Staging NOT REPORTED    BASIC METABOLIC PANEL    Collection Time: 12/13/21  5:36 AM   Result Value Ref Range    Glucose 99 70 - 99 mg/dL    BUN 42 (H) 6 - 20 mg/dL    CREATININE 1.32 (H) 0.50 - 0.90 mg/dL    Bun/Cre Ratio NOT REPORTED 9 - 20    Calcium 8.6 8.6 - 10.4 mg/dL    Sodium 140 135 - 144 mmol/L    Potassium 4.4 3.7 - 5.3 mmol/L    Chloride 110 (H) 98 - 107 mmol/L    CO2 19 (L) 20 - 31 mmol/L    Anion Gap 11 9 - 17 mmol/L    GFR Non-African American 42 (L) >60 mL/min    GFR  51 (L) >60 mL/min    GFR Comment          GFR Staging NOT REPORTED    CBC auto differential    Collection Time: 12/13/21  5:36 AM   Result Value Ref Range    WBC 8.8 3.5 - 11.0 k/uL    RBC 3.44 (L) 4.0 - 5.2 m/uL Hemoglobin 10.4 (L) 12.0 - 16.0 g/dL    Hematocrit 30.9 (L) 36 - 46 %    MCV 89.7 80 - 100 fL    MCH 30.3 26 - 34 pg    MCHC 33.8 31 - 37 g/dL    RDW 13.3 12.5 - 15.4 %    Platelets 992 229 - 182 k/uL    MPV 8.2 6.0 - 12.0 fL    NRBC Automated NOT REPORTED per 100 WBC    Differential Type NOT REPORTED     Seg Neutrophils 47 36 - 66 %    Lymphocytes 43 24 - 44 %    Monocytes 8 2 - 11 %    Eosinophils % 2 1 - 4 %    Basophils 0 0 - 2 %    Immature Granulocytes NOT REPORTED 0 %    Segs Absolute 4.20 1.8 - 7.7 k/uL    Absolute Lymph # 3.80 1.0 - 4.8 k/uL    Absolute Mono # 0.70 0.1 - 1.2 k/uL    Absolute Eos # 0.20 0.0 - 0.4 k/uL    Basophils Absolute 0.00 0.0 - 0.2 k/uL    Absolute Immature Granulocyte NOT REPORTED 0.00 - 0.30 k/uL    WBC Morphology NOT REPORTED     RBC Morphology NOT REPORTED     Platelet Estimate NOT REPORTED        Imaging/Diagnostics:  XR CHEST PORTABLE    Result Date: 12/12/2021  No acute process.        Assessment :      Hospital Problems           Last Modified POA    Diabetes mellitus, type 2 ([facility] [address] 12/13/2021 Yes    Hyperlipidemia 12/13/2021 Yes    Morbid (severe) obesity due to excess calories ([address] 12/13/2021 Yes    Overview Signed 3/19/2021 10:04 AM by Vinod Hoyos,      Formatting of this note might be different from the original.  Comorbidity diabetes and depression    Last Assessment & Plan:   Formatting of this note might be different from the original.  Condition: stable    Follow up in: one month         JOHN (acute kidney injury) ([facility] [address] 12/12/2021 Yes    Diarrhea of presumed infectious origin 12/13/2021 Yes           Plan:     Patient status inpatient in the Med/Surge    JOHN, improving   -Secondary to volume depletion in setting of persistent nausea/vomiting and diarrhea   -Most recent creatinine 1.32 (decreased from 2.40 yesterday)   -Continue maintenance fluids at 125 mL/hr   -BMP q12h     Intractable nausea and diarrhea   -Likely secondary to infectious enteritis   -GI pathogen panel pending   -Continue maintenance fluids as above     DM2   -Continue home Lantus 30 units nightly   -LDSSI   -Hypoglycemia protocol     HLD   -Continue home pravastatin 20 mg daily     Anxiety   -Continue home buspirone 30 mg tid     Morbid obesity 2/2 excessive caloric intake   -Will  on dietary modifications when appropriate     Consultations:   None    Patient is admitted as inpatient status because of co-morbidities listed above, severity of signs and symptoms as outlined, requirement for current medical therapies and most importantly because of direct risk to patient if care not provided in a hospital setting. Expected length of stay > 48 hours.     Yina Wylie MD  12/13/2021  8:31 AM    Copy sent to Dr. Ganesh Hollingsworth DO

## 2021-12-13 NOTE — ED NOTES
Ok per dr Macrina Escudero for pt to take own buspar 30mg, gabapentin 300mg, prevastatin 20mg, asa 81mg of own meds, witnessed per writer.   No new c/o continue awaiting bed asignment     [facility], RN  12/12/21 2124       [address] [place] [address], RN  12/12/21 2127

## 2021-12-13 NOTE — PROGRESS NOTES
Pt arrived at approx 2300. AOx4. Able to ambulate to bathroom without assistance. Admission documentation completed. Denies pain. Safety maintained.

## 2021-12-14 LAB
ANION GAP SERPL CALCULATED.3IONS-SCNC: 9 MMOL/L (ref 9–17)
ANION GAP SERPL CALCULATED.3IONS-SCNC: 9 MMOL/L (ref 9–17)
BUN BLDV-MCNC: 21 MG/DL (ref 6–20)
BUN BLDV-MCNC: 26 MG/DL (ref 6–20)
BUN/CREAT BLD: ABNORMAL (ref 9–20)
BUN/CREAT BLD: ABNORMAL (ref 9–20)
CALCIUM SERPL-MCNC: 8.3 MG/DL (ref 8.6–10.4)
CALCIUM SERPL-MCNC: 8.5 MG/DL (ref 8.6–10.4)
CAMPYLOBACTER PCR: NORMAL
CHLORIDE BLD-SCNC: 103 MMOL/L (ref 98–107)
CHLORIDE BLD-SCNC: 110 MMOL/L (ref 98–107)
CO2: 20 MMOL/L (ref 20–31)
CO2: 23 MMOL/L (ref 20–31)
CREAT SERPL-MCNC: 0.9 MG/DL (ref 0.5–0.9)
CREAT SERPL-MCNC: 1.06 MG/DL (ref 0.5–0.9)
E COLI ENTEROTOXIGENIC PCR: NORMAL
GFR AFRICAN AMERICAN: >60 ML/MIN
GFR AFRICAN AMERICAN: >60 ML/MIN
GFR NON-AFRICAN AMERICAN: 54 ML/MIN
GFR NON-AFRICAN AMERICAN: >60 ML/MIN
GFR SERPL CREATININE-BSD FRML MDRD: ABNORMAL ML/MIN/{1.73_M2}
GLUCOSE BLD-MCNC: 129 MG/DL (ref 70–99)
GLUCOSE BLD-MCNC: 153 MG/DL (ref 65–105)
GLUCOSE BLD-MCNC: 165 MG/DL (ref 65–105)
GLUCOSE BLD-MCNC: 82 MG/DL (ref 65–105)
GLUCOSE BLD-MCNC: 87 MG/DL (ref 70–99)
PLESIOMONAS SHIGELLOIDES PCR: NORMAL
POTASSIUM SERPL-SCNC: 4.2 MMOL/L (ref 3.7–5.3)
POTASSIUM SERPL-SCNC: 4.4 MMOL/L (ref 3.7–5.3)
SALMONELLA PCR: NORMAL
SHIGATOXIN GENE PCR: NORMAL
SHIGELLA SP PCR: NORMAL
SODIUM BLD-SCNC: 135 MMOL/L (ref 135–144)
SODIUM BLD-SCNC: 139 MMOL/L (ref 135–144)
SPECIMEN DESCRIPTION: NORMAL
VIBRIO PCR: NORMAL
YERSINIA ENTEROCOLITICA PCR: NORMAL

## 2021-12-14 PROCEDURE — 2580000003 HC RX 258: Performed by: STUDENT IN AN ORGANIZED HEALTH CARE EDUCATION/TRAINING PROGRAM

## 2021-12-14 PROCEDURE — 6370000000 HC RX 637 (ALT 250 FOR IP): Performed by: STUDENT IN AN ORGANIZED HEALTH CARE EDUCATION/TRAINING PROGRAM

## 2021-12-14 PROCEDURE — 1210000000 HC MED SURG R&B

## 2021-12-14 PROCEDURE — 82947 ASSAY GLUCOSE BLOOD QUANT: CPT

## 2021-12-14 PROCEDURE — 99232 SBSQ HOSP IP/OBS MODERATE 35: CPT | Performed by: STUDENT IN AN ORGANIZED HEALTH CARE EDUCATION/TRAINING PROGRAM

## 2021-12-14 PROCEDURE — 80048 BASIC METABOLIC PNL TOTAL CA: CPT

## 2021-12-14 PROCEDURE — 36415 COLL VENOUS BLD VENIPUNCTURE: CPT

## 2021-12-14 PROCEDURE — 6360000002 HC RX W HCPCS: Performed by: STUDENT IN AN ORGANIZED HEALTH CARE EDUCATION/TRAINING PROGRAM

## 2021-12-14 RX ORDER — GABAPENTIN 300 MG/1
300 CAPSULE ORAL 3 TIMES DAILY
Status: DISCONTINUED | OUTPATIENT
Start: 2021-12-14 | End: 2021-12-15 | Stop reason: HOSPADM

## 2021-12-14 RX ADMIN — HEPARIN SODIUM 5000 UNITS: 5000 INJECTION INTRAVENOUS; SUBCUTANEOUS at 06:17

## 2021-12-14 RX ADMIN — DICYCLOMINE HYDROCHLORIDE 20 MG: 10 CAPSULE ORAL at 13:32

## 2021-12-14 RX ADMIN — HEPARIN SODIUM 5000 UNITS: 5000 INJECTION INTRAVENOUS; SUBCUTANEOUS at 20:00

## 2021-12-14 RX ADMIN — GABAPENTIN 300 MG: 300 CAPSULE ORAL at 13:32

## 2021-12-14 RX ADMIN — ASPIRIN 81 MG: 81 TABLET, CHEWABLE ORAL at 08:23

## 2021-12-14 RX ADMIN — INSULIN GLARGINE 30 UNITS: 100 INJECTION, SOLUTION SUBCUTANEOUS at 21:55

## 2021-12-14 RX ADMIN — BUSPIRONE HYDROCHLORIDE 30 MG: 15 TABLET ORAL at 08:23

## 2021-12-14 RX ADMIN — INSULIN LISPRO 1 UNITS: 100 INJECTION, SOLUTION INTRAVENOUS; SUBCUTANEOUS at 15:57

## 2021-12-14 RX ADMIN — DICYCLOMINE HYDROCHLORIDE 20 MG: 10 CAPSULE ORAL at 08:23

## 2021-12-14 RX ADMIN — HEPARIN SODIUM 5000 UNITS: 5000 INJECTION INTRAVENOUS; SUBCUTANEOUS at 13:32

## 2021-12-14 RX ADMIN — DICYCLOMINE HYDROCHLORIDE 20 MG: 10 CAPSULE ORAL at 20:01

## 2021-12-14 RX ADMIN — PRAVASTATIN SODIUM 20 MG: 20 TABLET ORAL at 08:40

## 2021-12-14 RX ADMIN — INSULIN LISPRO 1 UNITS: 100 INJECTION, SOLUTION INTRAVENOUS; SUBCUTANEOUS at 21:56

## 2021-12-14 RX ADMIN — BUSPIRONE HYDROCHLORIDE 30 MG: 15 TABLET ORAL at 13:32

## 2021-12-14 RX ADMIN — SODIUM CHLORIDE: 9 INJECTION, SOLUTION INTRAVENOUS at 09:00

## 2021-12-14 RX ADMIN — SODIUM CHLORIDE, PRESERVATIVE FREE 10 ML: 5 INJECTION INTRAVENOUS at 20:01

## 2021-12-14 RX ADMIN — SODIUM CHLORIDE: 9 INJECTION, SOLUTION INTRAVENOUS at 04:08

## 2021-12-14 RX ADMIN — SODIUM CHLORIDE, PRESERVATIVE FREE 10 ML: 5 INJECTION INTRAVENOUS at 08:24

## 2021-12-14 RX ADMIN — GABAPENTIN 300 MG: 300 CAPSULE ORAL at 20:01

## 2021-12-14 RX ADMIN — DICYCLOMINE HYDROCHLORIDE 20 MG: 10 CAPSULE ORAL at 16:00

## 2021-12-14 RX ADMIN — BUSPIRONE HYDROCHLORIDE 30 MG: 15 TABLET ORAL at 20:01

## 2021-12-14 ASSESSMENT — PAIN SCALES - GENERAL
PAINLEVEL_OUTOF10: 0

## 2021-12-14 NOTE — CARE COORDINATION
[facility] ICU Quality Flow/Interdisciplinary Rounds Progress Note    Quality Flow Rounds held on December 14, 2021 at [address] Attending:  Bedside Nurse, , Nursing Unit Leadership, Respiratory Therapy, Physical Therapy and Occupational Therapy    Anticipated Discharge Date:  Expected Discharge Date: 12/15/21    Anticipated Discharge Disposition: home    Readmission Risk              Risk of Unplanned Readmission:  17           Discussed patient goal for the day, patient clinical progression, and barriers to discharge.   The following Goal(s) of the Day/Commitment(s) have been identified:  IVF continue, monitor labs       [facility], RN  December 14, 2021

## 2021-12-14 NOTE — PROGRESS NOTES
[facility]  Office: [address], DO, Anisa Pole, DO, Kristy Brookhaven, DO, Ford Hall Blood, DO, Yennifer Arrington MD, Sisi Camarena MD, Caio Molina MD, Leland Wilson MD, Heriberto Crespo MD, Selam Zuñiga MD, Sandra Gallegos MD, Lester Ribera, DO, Andrews Umanzor, DO, Ron Larson MD,  Rosa Coronel, DO, Amina Bauer MD, Clark Salazar MD, Molina Mcdonald MD, Rick Chester MD, James Caballero MD, Kyleigh Delgado MD, Sophia Alexander MD, Kristyn Darling, [facility], [facility], [facility], Fernanda Singh, [facility], Autumn Mckeon, CNS, Beau Batres, CNP, Angela Rodriguez, CNP, Narinder Price, CNP, Jennifer Cortez, CNP, Blanco Troncoso, CNP, Chavez Kennedy PA-C, Adam Navarro, [facility], Margaret Alberto, CNP, Refugio Bowling, CNP, Maikol Poole, CNP, Shahab Saeed, CNP, Harriet Anderson, CNP, Cory Jauregui, CNP, Joceline Dewitt [address]    Progress Note    12/14/2021    7:32 AM    Name:   Almeda Mortimer  MRN:     [id number]     [place]:      [de-identified]   Room:   [address] [place] [address] Day:  2  Admit Date:  12/12/2021 10:59 PM    PCP:   Hanna Marie DO  Code Status:  Full Code    Subjective:     C/C: Nausea/vomiting and diarrhea; dehydration     Interval History Status: improved. Patient was seen and examined at bedside this AM. She reports feeling much better and is feeling less fatigued. Creatinine is significantly improved today. Plan to continue IV hydration for an additional day with anticipated discharge tomorrow AM.     Brief History:     Almeda Mortimer is a 45 y.o. female with a past medical history of DM2, depression and HLD who presented to the emergency department on 12/12/2021 complaining of dizziness, fatigue as well as intractable nausea/vomiting and diarrhea. The patient states that her symptoms began about two-weeks-ago and have progressively worsened.  She initially had viral URI symptoms and was tested for COVID and influenza outpatient (both negative). The patient states that she was not improving and called her PCP her placed her on doxycycline for possible pneumonia. The patient states that she then began to experience nausea/vomiting and diarrhea and reports that this has been persistent. In the ED, the patient was hypotensive and ill-appearing. She was found to have an JOHN with a creatinine of 2.40 (baseline within normal limits). The patient's renal function and blood pressure improved with fluids in the emergency department. She is admitted to internal medicine for further management of JOHN secondary to volume depletion. Review of Systems:     Constitutional:  Positive for fatigue. Respiratory:  negative for cough, dyspnea on exertion, shortness of breath, wheezing  Cardiovascular:  negative for chest pain, chest pressure/discomfort, lower extremity edema, palpitations  Gastrointestinal:  negative for abdominal pain, constipation, diarrhea, nausea, vomiting  Neurological:  negative for dizziness, headache    Medications: Allergies: Allergies   Allergen Reactions    Latex Hives and Rash     After prolonged contact    After prolonged contact      Amoxicillin Hives    Atorvastatin Other (See Comments)     Stomach pain  Other reaction(s):  Other (See Comments), Other (See Comments)  Stomach pain  Stomach pain      Pineapple Itching       Current Meds:   Scheduled Meds:    aspirin  81 mg Oral Daily    busPIRone  30 mg Oral TID    dicyclomine  20 mg Oral 4x Daily    insulin glargine  30 Units SubCUTAneous Nightly    pravastatin  20 mg Oral Daily    insulin lispro  0-6 Units SubCUTAneous TID WC    insulin lispro  0-3 Units SubCUTAneous Nightly    sodium chloride flush  5-40 mL IntraVENous 2 times per day    heparin (porcine)  5,000 Units SubCUTAneous 3 times per day     Continuous Infusions:    dextrose      sodium chloride 125 mL/hr at 12/14/21 0408    sodium chloride       PRN Meds: glucose, dextrose, glucagon (rDNA), dextrose, sodium chloride flush, sodium chloride, ondansetron **OR** ondansetron, polyethylene glycol, acetaminophen **OR** acetaminophen    Data:     Past Medical History:   has a past medical history of Chronic deep vein thrombosis (DVT) of proximal vein of left lower extremity ([facility] [address] DDD (degenerative disc disease), thoracolumbar, Dyslipidemia, Essential hypertension, Gastroesophageal reflux disease without esophagitis, Mood disorder ([facility] [address] Neuropathy, Trigeminal neuralgia, and Type 2 diabetes mellitus with hyperglycemia, with long-term current use of insulin ([facility] Social History:   reports that she quit smoking about 2 years ago. She started smoking about 16 years ago. She has a 15.00 pack-year smoking history. She has never used smokeless tobacco. She reports current drug use. Drug: Marijuana Valdene Mercedes). She reports that she does not drink alcohol. Family History:   Family History   Problem Relation Age of Onset    COPD Mother     Other Mother         PAD    Diabetes type 2  Father        Vitals:  /70   Pulse 80   Temp 98.2 °F (36.8 °C) (Oral)   Resp 18   Ht 5' 5\" (1.651 m)   Wt 220 lb 0.3 oz (99.8 kg)   SpO2 97%   BMI 36.61 kg/m²   Temp (24hrs), Av.2 °F (36.8 °C), Min:97.7 °F (36.5 °C), Max:98.6 °F (37 °C)    Recent Labs     21  2303 21  1159   POCGLU 101 121*       I/O (24Hr):     Intake/Output Summary (Last 24 hours) at 2021 0732  Last data filed at 2021 1680  Gross per 24 hour   Intake 3214.59 ml   Output 1500 ml   Net 1714.59 ml       Labs:  Hematology:  Recent Labs     21  1400 21  0536   WBC 11.6* 8.8   RBC 4.40 3.44*   HGB 13.2 10.4*   HCT 39.3 30.9*   MCV 89.4 89.7   MCH 30.1 30.3   MCHC 33.7 33.8   RDW 13.2 13.3    331   MPV 7.7 8.2   DDIMER 0.60  --      Chemistry:  Recent Labs     21  1400 21  1705 21  0536 21  1821 21  0608      < > 140 140 139   K 4.6   < > 4.4 4.6 4.4   CL 96*   < > 110* 108* 110*   CO2 23   < > 19* 22 20   GLUCOSE 193*   < > 99 106* 87   BUN 58*   < > 42* 30* 26*   CREATININE 2.40*   < > 1.32* 1.30* 1.06*   ANIONGAP 17   < > 11 10 9   LABGLOM 21*   < > 42* 43* 54*   GFRAA 26*   < > 51* 52* >60   CALCIUM 11.6*   < > 8.6 8.7 8.3*   TROPHS 8  --   --   --   --     < > = values in this interval not displayed. Recent Labs     12/12/21  1400 12/12/21  2303 12/13/21  1159   PROT 6.3*  --   --    LABALBU 4.4  --   --    AST 18  --   --    ALT 22  --   --    ALKPHOS 75  --   --    BILITOT 0.30  --   --    POCGLU  --  101 121*     ABG:No results found for: POCPH, PHART, PH, POCPCO2, ZSF7JPB, PCO2, POCPO2, PO2ART, PO2, POCHCO3, YBB0CIL, HCO3, NBEA, PBEA, BEART, BE, THGBART, THB, WFE9QTN, XVFY8ANE, P5UCYVHM, O2SAT, FIO2  Lab Results   Component Value Date/Time    [facility] 12/12/2021 02:15 PM     Lab Results   Component Value Date/Time    CULTURE NO GROWTH 1 DAY 12/12/2021 02:15 PM       Radiology:  US RENAL COMPLETE    Result Date: 12/13/2021  No ultrasound evidence medical renal disease or hydronephrosis; fullness left renal pelvis. Empty urinary bladder. Hepatic steatosis. RECOMMENDATIONS: Unavailable     XR CHEST PORTABLE    Result Date: 12/12/2021  No acute process. Physical Examination:     General appearance:  alert, cooperative and no distress  Mental Status:  oriented to person, place and time and normal affect  Lungs:  clear to auscultation bilaterally, normal effort  Heart:  regular rate and rhythm, no murmur  Abdomen:  Obese.  soft, nontender, nondistended, normal bowel sounds, no masses, hepatomegaly, splenomegaly  Extremities:  no edema, redness, tenderness in the calves  Skin:  no gross lesions, rashes, induration    Assessment:     Hospital Problems           Last Modified POA    Diabetes mellitus, type 2 ([facility] [address] 12/13/2021 Yes    Hyperlipidemia 12/13/2021 Yes    Morbid (severe) obesity due to excess calories ([facility] [address] 12/13/2021 Yes    Overview Signed 3/19/2021 10:04 AM by Leida Woods,      Formatting of this note might be different from the original.  Comorbidity diabetes and depression    Last Assessment & Plan:   Formatting of this note might be different from the original.  Condition: stable    Follow up in: one month         JOHN (acute kidney injury) ([facility] [address] 12/12/2021 Yes    Diarrhea of presumed infectious origin 12/13/2021 Yes          Plan:     JOHN, improving   -Secondary to volume depletion in setting of persistent nausea/vomiting and diarrhea   -Most recent creatinine 1.06   -Continue maintenance fluids at 125 mL/hr   -BMP q12h      Intractable nausea and diarrhea   -Likely secondary to infectious enteritis   -GI pathogen panel pending   -Continue maintenance fluids as above      DM2   -Continue home Lantus 30 units nightly   -LDSSI   -Hypoglycemia protocol      HLD   -Continue home pravastatin 20 mg daily      Anxiety   -Continue home buspirone 30 mg tid      Morbid obesity 2/2 excessive caloric intake   -Will  on dietary modifications when appropriate     Pollo Nguyen MD  12/14/2021  7:32 AM

## 2021-12-15 VITALS
OXYGEN SATURATION: 96 % | TEMPERATURE: 98.5 F | SYSTOLIC BLOOD PRESSURE: 112 MMHG | WEIGHT: 220.02 LBS | HEART RATE: 80 BPM | DIASTOLIC BLOOD PRESSURE: 68 MMHG | RESPIRATION RATE: 18 BRPM | HEIGHT: 65 IN | BODY MASS INDEX: 36.66 KG/M2

## 2021-12-15 LAB
ANION GAP SERPL CALCULATED.3IONS-SCNC: 10 MMOL/L (ref 9–17)
BUN BLDV-MCNC: 17 MG/DL (ref 6–20)
BUN/CREAT BLD: ABNORMAL (ref 9–20)
CALCIUM SERPL-MCNC: 8.5 MG/DL (ref 8.6–10.4)
CHLORIDE BLD-SCNC: 107 MMOL/L (ref 98–107)
CO2: 21 MMOL/L (ref 20–31)
CREAT SERPL-MCNC: 0.91 MG/DL (ref 0.5–0.9)
GFR AFRICAN AMERICAN: >60 ML/MIN
GFR NON-AFRICAN AMERICAN: >60 ML/MIN
GFR SERPL CREATININE-BSD FRML MDRD: ABNORMAL ML/MIN/{1.73_M2}
GFR SERPL CREATININE-BSD FRML MDRD: ABNORMAL ML/MIN/{1.73_M2}
GLUCOSE BLD-MCNC: 121 MG/DL (ref 65–105)
GLUCOSE BLD-MCNC: 133 MG/DL (ref 70–99)
POTASSIUM SERPL-SCNC: 4.1 MMOL/L (ref 3.7–5.3)
SODIUM BLD-SCNC: 138 MMOL/L (ref 135–144)

## 2021-12-15 PROCEDURE — 99238 HOSP IP/OBS DSCHRG MGMT 30/<: CPT | Performed by: STUDENT IN AN ORGANIZED HEALTH CARE EDUCATION/TRAINING PROGRAM

## 2021-12-15 PROCEDURE — 36415 COLL VENOUS BLD VENIPUNCTURE: CPT

## 2021-12-15 PROCEDURE — 80048 BASIC METABOLIC PNL TOTAL CA: CPT

## 2021-12-15 PROCEDURE — 6360000002 HC RX W HCPCS: Performed by: STUDENT IN AN ORGANIZED HEALTH CARE EDUCATION/TRAINING PROGRAM

## 2021-12-15 PROCEDURE — 6370000000 HC RX 637 (ALT 250 FOR IP): Performed by: STUDENT IN AN ORGANIZED HEALTH CARE EDUCATION/TRAINING PROGRAM

## 2021-12-15 PROCEDURE — 82947 ASSAY GLUCOSE BLOOD QUANT: CPT

## 2021-12-15 RX ADMIN — BUSPIRONE HYDROCHLORIDE 30 MG: 15 TABLET ORAL at 10:22

## 2021-12-15 RX ADMIN — PRAVASTATIN SODIUM 20 MG: 20 TABLET ORAL at 10:21

## 2021-12-15 RX ADMIN — DICYCLOMINE HYDROCHLORIDE 20 MG: 10 CAPSULE ORAL at 10:21

## 2021-12-15 RX ADMIN — HEPARIN SODIUM 5000 UNITS: 5000 INJECTION INTRAVENOUS; SUBCUTANEOUS at 06:02

## 2021-12-15 RX ADMIN — ASPIRIN 81 MG: 81 TABLET, CHEWABLE ORAL at 10:21

## 2021-12-15 RX ADMIN — GABAPENTIN 300 MG: 300 CAPSULE ORAL at 10:21

## 2021-12-15 ASSESSMENT — PAIN SCALES - GENERAL
PAINLEVEL_OUTOF10: 0
PAINLEVEL_OUTOF10: 0

## 2021-12-15 NOTE — DISCHARGE SUMMARY
[facility]  Office: [address], DO, Gurmeet Maurer, DO, Pedro Nails, DO, Macario Mathis Blood, DO, Gracie Amezcua MD, Rishi Moore MD, Anup España MD, Erickson Foley MD, Griselda Luna MD, Jaspal Day MD, Margarita Melgar MD, Capo Ingram, DO, Keira Alberto, DO, Nory Mcbride MD,  Prashant Valenzuela DO, Lauryn Mcclain MD, Kylie Ramirez MD, Cipriano Ogden MD, Kathleen Sarmiento MD, Hannah Yoo MD, Marlyn Singh MD, Arlette Ayala MD, Yeison Marvin, [facility], [facility], [facility], Radha Davis, CNP, Valorie Archibald, CNS, Ly Krueger, CNP, Juan Pablo Barclay, CNP, Varun Fisher, CNP, Adilson Huynh, CNP, Tacho Lauren, CNP, Peter Gaston PA-C, Arnulfo Mills, [facility], Pretty Moseley, CNP, Mable aHnna, CNP, Marisela Hardy, CNP, Isiah Solis, CNP, Sky Morrell, CNP, Anuradha Emanuel, CNP, Joceline Rivera [address]    Discharge Summary     Patient ID: Nik Sepulveda  :  1970   MRN: [id number]     ACCOUNT:  [de-identified]   Patient's PCP: Mikal Tatum DO  Admit Date: 2021   Discharge Date: 12/15/2021     Length of Stay: 3  Code Status:  Full Code  Admitting Physician: Kathleen Sarmiento MD  Discharge Physician: Kathleen Sarmiento MD     Active Discharge Diagnoses:     Hospital Problem Lists:  Active Problems:    Diabetes mellitus, type 2 ([facility]    Hyperlipidemia    Morbid (severe) obesity due to excess calories ([facility]    JOHN (acute kidney injury) ([facility]    Diarrhea of presumed infectious origin  Resolved Problems:    * No resolved hospital problems. *      Admission Condition:  fair    Discharged Condition: good    Hospital Stay:     Hospital Course:  Nik Sepulveda is a 45 y.o. female with a past medical history of DM2, depression and HLD who presented to the emergency department on 2021 complaining of dizziness, fatigue as well as intractable nausea/vomiting and diarrhea.  The patient states that her symptoms began about two-weeks-ago and have progressively worsened. She initially had viral URI symptoms and was tested for COVID and influenza outpatient (both negative). The patient states that she was not improving and called her PCP her placed her on doxycycline for possible pneumonia. The patient states that she then began to experience nausea/vomiting and diarrhea and reports that this has been persistent. In the ED, the patient was hypotensive and ill-appearing. She was found to have an JOHN with a creatinine of 2.40 (baseline within normal limits). The patient's renal function and blood pressure improved with fluids in the emergency department. She was admitted to internal medicine for further management of JOHN secondary to volume depletion. The patient improved with IV fluids throughout the course of hospitalization and creatinine normalized on the day of discharge. She is discharged today (12/15) in stable condition. The patient is instructed to follow-up with her primary care physician as scheduled. Significant therapeutic interventions: IV fluids     Significant Diagnostic Studies:   Labs / Micro:  BMP:    Lab Results   Component Value Date    GLUCOSE 133 12/15/2021     12/15/2021    K 4.1 12/15/2021     12/15/2021    CO2 21 12/15/2021    ANIONGAP 10 12/15/2021    BUN 17 12/15/2021    CREATININE 0.91 12/15/2021    BUNCRER NOT REPORTED 12/15/2021    CALCIUM 8.5 12/15/2021    LABGLOM >60 12/15/2021    GFRAA >60 12/15/2021    GFR      12/15/2021    GFR NOT REPORTED 12/15/2021       Radiology:  US RENAL COMPLETE    Result Date: 12/13/2021  No ultrasound evidence medical renal disease or hydronephrosis; fullness left renal pelvis. Empty urinary bladder. Hepatic steatosis. RECOMMENDATIONS: Unavailable     XR CHEST PORTABLE    Result Date: 12/12/2021  No acute process.        Consultations:    Consults:     Final Specialist Recommendations/Findings:   None      The patient was seen and examined on day of discharge and this discharge summary is in conjunction with any daily progress note from day of discharge. Discharge plan:     Disposition: Home    Physician Follow Up:     Leida Woods DO  [address]  [phone]    In 1 week  Hospital follow-up        Requiring Further Evaluation/Follow Up POST HOSPITALIZATION/Incidental Findings: Recommend repeat BMP in one-week. Diet: regular diet and diabetic diet    Activity: As tolerated    Instructions to Patient: Follow-up with your primary care physician as scheduled. Discharge Medications:      Medication List      CONTINUE taking these medications    aspirin 81 MG chewable tablet     Basaglar KwikPen 100 UNIT/ML injection pen  Generic drug: insulin glargine  Inject 30 Units into the skin every evening     busPIRone 30 MG tablet  Commonly known as: BUSPAR  Take 30 mg by mouth 3 times daily     dicyclomine 20 MG tablet  Commonly known as: BENTYL  Take 1 tablet by mouth 4 times daily     gabapentin 300 MG capsule  Commonly known as: NEURONTIN  Take 1 capsule by mouth 3 times daily for 90 days. glimepiride 2 MG tablet  Commonly known as: AMARYL  Take 1 tablet by mouth daily (with breakfast)     mometasone 0.1 % cream  Commonly known as: Elocon  Apply topically daily. Ozempic (0.25 or 0.5 MG/DOSE) 2 MG/1.5ML Sopn  Generic drug: Semaglutide(0.25 or 0.5MG/DOS)  DIAL AND INJECT UNDER THE SKIN 0.25 MG WEEKLY     pravastatin 20 MG tablet  Commonly known as: PRAVACHOL  Take 1 tablet by mouth daily        STOP taking these medications    doxycycline hyclate 100 MG tablet  Commonly known as: VIBRA-TABS     lisinopril 20 MG tablet  Commonly known as: PRINIVIL;ZESTRIL            No discharge procedures on file. Time Spent on discharge is  20 mins in patient examination, evaluation, counseling as well as medication reconciliation, prescriptions for required medications, discharge plan and follow up.     Electronically signed by   Rebeca Ontiveros MD  12/15/2021  9:02 AM      Thank you Dr. Latasha Garcia DO for the opportunity to be involved in this patient's care.

## 2021-12-15 NOTE — PLAN OF CARE
Problem: Infection:  Goal: Will remain free from infection  Description: Will remain free from infection  Outcome: Ongoing     Problem: Safety:  Goal: Free from accidental physical injury  Description: Free from accidental physical injury  Outcome: Ongoing  Goal: Free from intentional harm  Description: Free from intentional harm  Outcome: Ongoing     Problem: Daily Care:  Goal: Daily care needs are met  Description: Daily care needs are met  Outcome: Ongoing     Problem: Pain:  Goal: Patient's pain/discomfort is manageable  Description: Patient's pain/discomfort is manageable  Outcome: Ongoing  Goal: Pain level will decrease  Description: Pain level will decrease  Outcome: Ongoing  Goal: Control of acute pain  Description: Control of acute pain  Outcome: Ongoing  Goal: Control of chronic pain  Description: Control of chronic pain  Outcome: Ongoing     Problem: Skin Integrity:  Goal: Skin integrity will stabilize  Description: Skin integrity will stabilize  Outcome: Ongoing     Problem: Discharge Planning:  Goal: Patients continuum of care needs are met  Description: Patients continuum of care needs are met  Outcome: Ongoing     Problem: Skin Integrity:  Goal: Will show no infection signs and symptoms  Description: Will show no infection signs and symptoms  Outcome: Ongoing  Goal: Absence of new skin breakdown  Description: Absence of new skin breakdown  Outcome: Ongoing

## 2021-12-16 ENCOUNTER — TELEPHONE (OUTPATIENT)
Dept: FAMILY MEDICINE CLINIC | Age: 51
End: 2021-12-16

## 2021-12-17 ENCOUNTER — TELEPHONE (OUTPATIENT)
Dept: FAMILY MEDICINE CLINIC | Age: 51
End: 2021-12-17

## 2021-12-17 LAB
CULTURE: NORMAL
CULTURE: NORMAL
Lab: NORMAL
Lab: NORMAL
SPECIMEN DESCRIPTION: NORMAL
SPECIMEN DESCRIPTION: NORMAL

## 2021-12-17 NOTE — TELEPHONE ENCOUNTER
[facility] Transitions Initial Follow Up Call    Outreach made within 2 business days of discharge: Yes    Patient: Thea Mckeon Patient : 1970   MRN: [id number]  Reason for Admission: There are no discharge diagnoses documented for the most recent discharge. Discharge Date: 12/15/21       Spoke with: Has a wan on 21    Discharge department/facility: [facility] Interactive Patient Contact:  Was patient able to fill all prescriptions: Yes  Was patient instructed to bring all medications to the follow-up visit: Yes  Is patient taking all medications as directed in the discharge summary?  Yes  Does patient understand their discharge instructions: Yes  Does patient have questions or concerns that need addressed prior to 7-14 day follow up office visit: yes -     Scheduled appointment with PCP within 7-14 days    Follow Up  Future Appointments   Date Time Provider Rito Olson   2021  7:00 AM DO KARENA Vance [facility]   2022  9:00 AM [facility]   2022  8:20 AM DO [facility]   3/9/2022  2:20 PM Gabe Jauregui MD [facility] [address] [place]

## 2021-12-20 ENCOUNTER — OFFICE VISIT (OUTPATIENT)
Dept: FAMILY MEDICINE CLINIC | Age: 51
End: 2021-12-20
Payer: COMMERCIAL

## 2021-12-20 ENCOUNTER — HOSPITAL ENCOUNTER (OUTPATIENT)
Age: 51
Setting detail: SPECIMEN
Discharge: HOME | End: 2021-12-20

## 2021-12-20 VITALS
TEMPERATURE: 96.6 F | SYSTOLIC BLOOD PRESSURE: 114 MMHG | HEART RATE: 87 BPM | WEIGHT: 232 LBS | OXYGEN SATURATION: 100 % | BODY MASS INDEX: 38.61 KG/M2 | DIASTOLIC BLOOD PRESSURE: 78 MMHG

## 2021-12-20 DIAGNOSIS — E11.65 TYPE 2 DIABETES MELLITUS WITH HYPERGLYCEMIA, WITH LONG-TERM CURRENT USE OF INSULIN (HCC): ICD-10-CM

## 2021-12-20 DIAGNOSIS — Z09 HOSPITAL DISCHARGE FOLLOW-UP: Primary | ICD-10-CM

## 2021-12-20 DIAGNOSIS — I10 HTN, GOAL BELOW 130/80: ICD-10-CM

## 2021-12-20 DIAGNOSIS — N17.9 AKI (ACUTE KIDNEY INJURY) (HCC): ICD-10-CM

## 2021-12-20 DIAGNOSIS — Z79.4 TYPE 2 DIABETES MELLITUS WITH HYPERGLYCEMIA, WITH LONG-TERM CURRENT USE OF INSULIN (HCC): ICD-10-CM

## 2021-12-20 LAB
ABSOLUTE EOS #: 0.22 K/UL (ref 0–0.44)
ABSOLUTE IMMATURE GRANULOCYTE: 0.09 K/UL (ref 0–0.3)
ABSOLUTE LYMPH #: 3.17 K/UL (ref 1.1–3.7)
ABSOLUTE MONO #: 0.88 K/UL (ref 0.1–1.2)
ALBUMIN SERPL-MCNC: 4.1 G/DL (ref 3.5–5.2)
ALBUMIN/GLOBULIN RATIO: 1.6 (ref 1–2.5)
ALP BLD-CCNC: 74 U/L (ref 35–104)
ALT SERPL-CCNC: 40 U/L (ref 5–33)
ANION GAP SERPL CALCULATED.3IONS-SCNC: 14 MMOL/L (ref 9–17)
AST SERPL-CCNC: 33 U/L
BASOPHILS # BLD: 1 % (ref 0–2)
BASOPHILS ABSOLUTE: 0.05 K/UL (ref 0–0.2)
BILIRUB SERPL-MCNC: <0.1 MG/DL (ref 0.3–1.2)
BUN BLDV-MCNC: 13 MG/DL (ref 6–20)
BUN/CREAT BLD: ABNORMAL (ref 9–20)
CALCIUM SERPL-MCNC: 8.2 MG/DL (ref 8.6–10.4)
CHLORIDE BLD-SCNC: 103 MMOL/L (ref 98–107)
CO2: 22 MMOL/L (ref 20–31)
CREAT SERPL-MCNC: 0.71 MG/DL (ref 0.5–0.9)
DIFFERENTIAL TYPE: ABNORMAL
EOSINOPHILS RELATIVE PERCENT: 2 % (ref 1–4)
GFR AFRICAN AMERICAN: >60 ML/MIN
GFR NON-AFRICAN AMERICAN: >60 ML/MIN
GFR SERPL CREATININE-BSD FRML MDRD: ABNORMAL ML/MIN/{1.73_M2}
GFR SERPL CREATININE-BSD FRML MDRD: ABNORMAL ML/MIN/{1.73_M2}
GLUCOSE BLD-MCNC: 147 MG/DL (ref 70–99)
HCT VFR BLD CALC: 37 % (ref 36.3–47.1)
HEMOGLOBIN: 11.9 G/DL (ref 11.9–15.1)
IMMATURE GRANULOCYTES: 1 %
LYMPHOCYTES # BLD: 30 % (ref 24–43)
MAGNESIUM: 2 MG/DL (ref 1.6–2.6)
MCH RBC QN AUTO: 30.4 PG (ref 25.2–33.5)
MCHC RBC AUTO-ENTMCNC: 32.2 G/DL (ref 28.4–34.8)
MCV RBC AUTO: 94.6 FL (ref 82.6–102.9)
MONOCYTES # BLD: 8 % (ref 3–12)
NRBC AUTOMATED: 0 PER 100 WBC
PDW BLD-RTO: 13.2 % (ref 11.8–14.4)
PLATELET # BLD: 409 K/UL (ref 138–453)
PLATELET ESTIMATE: ABNORMAL
PMV BLD AUTO: 10.6 FL (ref 8.1–13.5)
POTASSIUM SERPL-SCNC: 3.9 MMOL/L (ref 3.7–5.3)
RBC # BLD: 3.91 M/UL (ref 3.95–5.11)
RBC # BLD: ABNORMAL 10*6/UL
SEG NEUTROPHILS: 58 % (ref 36–65)
SEGMENTED NEUTROPHILS ABSOLUTE COUNT: 6.02 K/UL (ref 1.5–8.1)
SODIUM BLD-SCNC: 139 MMOL/L (ref 135–144)
TOTAL PROTEIN: 6.7 G/DL (ref 6.4–8.3)
WBC # BLD: 10.4 K/UL (ref 3.5–11.3)
WBC # BLD: ABNORMAL 10*3/UL

## 2021-12-20 PROCEDURE — 99496 TRANSJ CARE MGMT HIGH F2F 7D: CPT | Performed by: FAMILY MEDICINE

## 2021-12-20 PROCEDURE — 1111F DSCHRG MED/CURRENT MED MERGE: CPT | Performed by: FAMILY MEDICINE

## 2021-12-20 ASSESSMENT — PATIENT HEALTH QUESTIONNAIRE - PHQ9
SUM OF ALL RESPONSES TO PHQ9 QUESTIONS 1 & 2: 0
SUM OF ALL RESPONSES TO PHQ QUESTIONS 1-9: 0
SUM OF ALL RESPONSES TO PHQ QUESTIONS 1-9: 0
1. LITTLE INTEREST OR PLEASURE IN DOING THINGS: 0
2. FEELING DOWN, DEPRESSED OR HOPELESS: 0
SUM OF ALL RESPONSES TO PHQ QUESTIONS 1-9: 0

## 2021-12-20 NOTE — PROGRESS NOTES
Post-Discharge Transitional Care Management Services or Hospital Follow Up      Prakash Lamb   YOB: 1970    Date of Office Visit:  12/20/2021  Date of Hospital Admission: 12/12/21  Date of Hospital Discharge: 12/15/21  Readmission Risk Score(high >=14%. Medium >=10%):Readmission Risk Score: 9.3 ( )      Care management risk score Rising risk (score 2-5) and Complex Care (Scores >=6): 1     Non face to face  following discharge, date last encounter closed (first attempt may have been earlier): 12/17/2021  3:17 PM 12/17/2021  3:17 PM    Call initiated 2 business days of discharge: Yes     Patient Active Problem List   Diagnosis    Allergic rhinitis    Anxiety    Body mass index (BMI) of 37.0-37.9 in adult    Cataract fragments of right eye following cataract surgery    DDD (degenerative disc disease), cervical    Deep vein thrombosis (DVT) of left upper extremity ([address]    Major depressive disorder    Diabetes mellitus, type 2 ([address]    GERD (gastroesophageal reflux disease)    Hyperlipidemia    Morbid (severe) obesity due to excess calories ([address]    Pelvic floor dysfunction    Polyneuropathy due to type 2 diabetes mellitus ([address]    Trigeminal neuralgia    Urinary incontinence    JOHN (acute kidney injury) ([address]    Diarrhea of presumed infectious origin       Allergies   Allergen Reactions    Latex Hives and Rash     After prolonged contact    After prolonged contact      Amoxicillin Hives    Atorvastatin Other (See Comments)     Stomach pain  Other reaction(s): Other (See Comments), Other (See Comments)  Stomach pain  Stomach pain      Pineapple Itching       Medications listed as ordered at the time of discharge from hospital     Medication List          Accurate as of December 20, 2021  7:13 AM. If you have any questions, ask your nurse or doctor.             CHANGE how you take these medications    Ozempic (0.25 or 0.5 MG/DOSE) 2 MG/1.5ML Sopn  Generic drug: medications ordered at time of hospital discharge: Yes    Chief Complaint   Patient presents with    Follow-Up from Hospital       HPI    Patient is a 45 y. o. female with a past medical history of DM2, depression and HLD who presented to the emergency department on 12/12/2021 complaining of dizziness, fatigue as well as intractable nausea/vomiting and diarrhea. The patient states that her symptoms began two-weeks prior to presentation and had been worsening. She initially had viral URI symptoms and was tested for COVID and influenza outpatient and were both negative. The patient states that she was not improving and called her PCP her placed her on doxycycline for possible pneumonia. The patient states that she then began to experience nausea/vomiting and diarrhea and reports that this has been persistent. In the ED, the patient was hypotensive and ill-appearing. She was found to have an JOHN with a creatinine of 2.40 (baseline within normal limits). The patient's renal function and blood pressure improved with fluids in the emergency department. She was admitted to internal medicine for further management of JOHN secondary to volume depletion. The patient improved with IV fluids throughout the course of hospitalization and creatinine normalized on the day of discharge. She is discharged today (12/15) in stable condition. Patient today denies cp/sob/le edema/dizziness/lightheadedness/blurry va/ha. Patient denies PND/orthopnea. Patient did have lisinopril stopped in the hospital due to hypotension and JOHN. Inpatient course: Discharge summary reviewed- see chart. Interval history/Current status: stable      Vitals:    12/20/21 0703   BP: 114/78   Pulse: 87   Temp: 96.6 °F (35.9 °C)   TempSrc: Temporal   SpO2: 100%   Weight: 232 lb (105.2 kg)     Body mass index is 38.61 kg/m².    Wt Readings from Last 3 Encounters:   12/20/21 232 lb (105.2 kg)   12/14/21 220 lb 0.3 oz (99.8 kg)   12/12/21 220 lb (99.8 kg) BP Readings from Last 3 Encounters:   12/20/21 114/78   12/15/21 112/68   12/12/21 (!) 126/57       ROS:    Constitutional: No fevers, chills, fatigue. ENT: No nasal congestion or sore throat  Respiratory: No difficulty in breathing or cough. Cardiovascular: No chest pain, palpitations or shortness of breath  Gastrointestinal: No abdominal pain or change in bowel movements. Genitourinary: No change in urinary frequency or dysuria. Skin: No rashes or skin lesions. Neurological: No weakness. No headaches. PE:    GENERAL APPEARANCE: in no acute distress, well developed, well nourished. HEAD: normocephalic, atraumatic. EYES: extraocular movement intact (EOMI), pupils equal, round, reactive to light and accommodation. EARS: normal, tympanic membrane intact, clear, auditory canal clear. NOSE: nares patent, no erythema, sinuses nontender bilaterally, no rhinorrhea. ORAL CAVITY: mucosa moist, no lesions. THROAT: clear, no mass, no exudate. NECK/THYROID: neck supple, full range of motion, no thyromegaly. HEART: no murmurs, regular rate and rhythm, S1, S2 normal.   LUNGS: clear to auscultation bilaterally, no wheezes, rales, rhonchi. ABDOMEN: normal, bowel sounds present, soft, nontender, nondistended, no rebound guarding or rigidity          Assessment/Plan:  1. Hospital discharge follow-up    - CT DISCHARGE MEDS RECONCILED W/ CURRENT OUTPATIENT MED LIST    2. JOHN (acute kidney injury) ([facility]    - CBC Auto Differential; Future  - Magnesium; Future  - Comprehensive Metabolic Panel; Future    3. Type 2 diabetes mellitus with hyperglycemia, with long-term current use of insulin (HCC)    - CBC Auto Differential; Future  - Magnesium; Future  - Comprehensive Metabolic Panel; Future    4. HTN, goal below 130/80    - CBC Auto Differential; Future  - Magnesium; Future  - Comprehensive Metabolic Panel; Future        Medical Decision Making: high complexity    Will follow up on labs.  If JOHN resolved will restart lisinopril and titrate up as needed. Will likely start with low dose. RTC precautions provided. Reviewed DC summary with patient. Ab Garces, DO     Return for keep appt.

## 2022-01-06 DIAGNOSIS — Z76.0 MEDICATION REFILL: ICD-10-CM

## 2022-01-06 RX ORDER — GABAPENTIN 300 MG/1
300 CAPSULE ORAL 3 TIMES DAILY
Qty: 270 CAPSULE | Refills: 0 | Status: SHIPPED | OUTPATIENT
Start: 2022-01-06 | End: 2022-02-11

## 2022-01-06 RX ORDER — BUSPIRONE HYDROCHLORIDE 30 MG/1
30 TABLET ORAL 3 TIMES DAILY
Qty: 90 TABLET | Refills: 0 | Status: SHIPPED | OUTPATIENT
Start: 2022-01-06 | End: 2022-02-16 | Stop reason: SDUPTHER

## 2022-01-06 NOTE — TELEPHONE ENCOUNTER
Marivel Ham is calling to request a refill on the following medication(s):    Medication Request:  Requested Prescriptions     Pending Prescriptions Disp Refills    gabapentin (NEURONTIN) 300 MG capsule 270 capsule 0     Sig: Take 1 capsule by mouth 3 times daily for 90 days.     busPIRone (BUSPAR) 30 MG tablet 90 tablet 0     Sig: Take 30 mg by mouth 3 times daily       Last Visit Date (If Applicable):  74/00/0415    Next Visit Date:    1/27/2022

## 2022-01-07 LAB
GLUCOSE BLD-MCNC: 152 MG/DL (ref 65–105)
GLUCOSE BLD-MCNC: 188 MG/DL (ref 65–105)

## 2022-01-17 DIAGNOSIS — Z76.0 MEDICATION REFILL: ICD-10-CM

## 2022-01-18 RX ORDER — GLIMEPIRIDE 2 MG/1
2 TABLET ORAL
Qty: 90 TABLET | Refills: 0 | Status: SHIPPED | OUTPATIENT
Start: 2022-01-18 | End: 2022-04-08 | Stop reason: SDUPTHER

## 2022-01-18 NOTE — TELEPHONE ENCOUNTER
Sagar Osborne is calling to request a refill on the following medication(s):    Medication Request:  Requested Prescriptions     Pending Prescriptions Disp Refills    glimepiride (AMARYL) 2 MG tablet 90 tablet 0     Sig: Take 1 tablet by mouth daily (with breakfast)       Last Visit Date (If Applicable):  02/10/4905    Next Visit Date:    1/27/2022

## 2022-01-20 ENCOUNTER — INITIAL CONSULT (OUTPATIENT)
Dept: VASCULAR SURGERY | Age: 52
End: 2022-01-20
Payer: COMMERCIAL

## 2022-01-20 VITALS
TEMPERATURE: 96.8 F | DIASTOLIC BLOOD PRESSURE: 80 MMHG | HEART RATE: 77 BPM | OXYGEN SATURATION: 97 % | BODY MASS INDEX: 39.65 KG/M2 | WEIGHT: 238 LBS | RESPIRATION RATE: 20 BRPM | SYSTOLIC BLOOD PRESSURE: 133 MMHG | HEIGHT: 65 IN

## 2022-01-20 DIAGNOSIS — I73.9 PAD (PERIPHERAL ARTERY DISEASE) (HCC): Primary | ICD-10-CM

## 2022-01-20 PROCEDURE — 1036F TOBACCO NON-USER: CPT | Performed by: SURGERY

## 2022-01-20 PROCEDURE — 3017F COLORECTAL CA SCREEN DOC REV: CPT | Performed by: SURGERY

## 2022-01-20 PROCEDURE — G8417 CALC BMI ABV UP PARAM F/U: HCPCS | Performed by: SURGERY

## 2022-01-20 PROCEDURE — G8427 DOCREV CUR MEDS BY ELIG CLIN: HCPCS | Performed by: SURGERY

## 2022-01-20 PROCEDURE — 99204 OFFICE O/P NEW MOD 45 MIN: CPT | Performed by: SURGERY

## 2022-01-20 PROCEDURE — G8482 FLU IMMUNIZE ORDER/ADMIN: HCPCS | Performed by: SURGERY

## 2022-01-20 NOTE — PROGRESS NOTES
Division of Vascular Surgery        New Consult      Physician Requesting Consult:  Dr. Thad Alcaraz    Reason for Consult:   PAD    Chief Complaint:      Claudication, leg pain    History of Present Illness:      Bobby Biswas is a 45 y.o. woman who presents with symptoms of claudication like pain, left worse then right. Cramping starts in her feet and radiates up into her mid calf. Right foot is not as bad, but gets numb every once in a while. The cramping develops when she walks about 1 block, pain will start sometimes in her anterior shin and then start cramping in her calf, when she stops and rests her symptoms improve. She was recently admitted to the hospital for acute kidney injury. She denies symptoms suggestive of ischemic rest pain, she does not have any open wounds or sores on her feet.     Medical History:     Past Medical History:   Diagnosis Date    Chronic deep vein thrombosis (DVT) of proximal vein of left lower extremity (HCC)     DDD (degenerative disc disease), thoracolumbar     Dyslipidemia     Essential hypertension     Gastroesophageal reflux disease without esophagitis     Mood disorder ([facility])     Neuropathy     Trigeminal neuralgia     Type 2 diabetes mellitus with hyperglycemia, with long-term current use of insulin ([facility])        Surgical History:     Past Surgical History:   Procedure Laterality Date    CATARACT REMOVAL WITH IMPLANT      CHOLECYSTECTOMY         Family History:     Family History   Problem Relation Age of Onset    COPD Mother     Other Mother         PAD    Diabetes type 2  Father    Mother had bilateral lower extremity amputations    Allergies:       Latex, Amoxicillin, Atorvastatin, and Pineapple    Medications:      Current Outpatient Medications   Medication Sig Dispense Refill    glimepiride (AMARYL) 2 MG tablet Take 1 tablet by mouth daily (with breakfast) 90 tablet 0    gabapentin (NEURONTIN) 300 MG capsule Take 1 capsule by mouth 3 times daily for 90 days. 270 capsule 0    busPIRone (BUSPAR) 30 MG tablet Take 30 mg by mouth 3 times daily 90 tablet 0    Semaglutide,0.25 or 0.5MG/DOS, (OZEMPIC, 0.25 OR 0.5 MG/DOSE,) 2 MG/1.5ML SOPN DIAL AND INJECT UNDER THE SKIN 0.25 MG WEEKLY (Patient taking differently: DIAL AND INJECT UNDER THE SKIN 0.5 MG WEEKLY) 3 pen 1    pravastatin (PRAVACHOL) 20 MG tablet Take 1 tablet by mouth daily 90 tablet 0    mometasone (ELOCON) 0.1 % cream Apply topically daily. 50 g 0    insulin glargine (BASAGLAR KWIKPEN) 100 UNIT/ML injection pen Inject 30 Units into the skin every evening 15 pen 2    aspirin 81 MG chewable tablet Take 81 mg by mouth daily      dicyclomine (BENTYL) 20 MG tablet Take 1 tablet by mouth 4 times daily 360 tablet 0     No current facility-administered medications for this visit. Social History:     Tobacco:    reports that she quit smoking about 2 years ago. She started smoking about 17 years ago. She has a 15.00 pack-year smoking history. She has never used smokeless tobacco.  Alcohol:      reports no history of alcohol use. Drug Use:  reports current drug use. Drug: Marijuana Gelcatina Johnston). 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 HENT: Negative for congestion. Eyes: Negative for visual disturbance. Respiratory: Negative for chest tightness and shortness of breath. Cardiovascular: Negative for chest pain and leg swelling. Gastrointestinal: Negative for abdominal pain. Endocrine: Negative. Genitourinary: Negative. Musculoskeletal: Negative. Skin: Negative for color change and wound. Allergic/Immunologic: Negative. Neurological: Positive for numbness. Negative for facial asymmetry, speech difficulty and weakness. Hematological: Negative. Psychiatric/Behavioral: Negative. Physical Exam:     Vitals:  /80 (Site: Left Upper Arm, Position: Sitting, Cuff Size: Large Adult)   Pulse 77   Temp 96.8 °F (36 °C) (Temporal)   Resp 20   Ht 5' 5\" (1.651 m)   Wt 238 lb (108 kg)   SpO2 97%   BMI 39.61 kg/m²     Physical Exam  Constitutional:       Appearance: She is well-developed and well-groomed. Eyes:      Extraocular Movements: Extraocular movements intact. Conjunctiva/sclera: Conjunctivae normal.   Neck:      Vascular: No carotid bruit. Cardiovascular:      Rate and Rhythm: Normal rate and regular rhythm. Pulses:           Radial pulses are 2+ on the right side and 2+ on the left side. Dorsalis pedis pulses are detected w/ Doppler on the right side and detected w/ Doppler on the left side. Posterior tibial pulses are detected w/ Doppler on the right side and detected w/ Doppler on the left side. Pulmonary:      Effort: Pulmonary effort is normal. No respiratory distress. Abdominal:      Palpations: Abdomen is soft. Tenderness: There is no abdominal tenderness. Musculoskeletal:      Cervical back: Full passive range of motion without pain. Right lower leg: No swelling or tenderness. No edema. Left lower leg: No swelling or tenderness. No edema. Right foot: Normal capillary refill. No swelling or tenderness. Left foot: Normal capillary refill. No swelling or tenderness. Feet:      Right foot:      Skin integrity: No ulcer or skin breakdown. Left foot:      Skin integrity: No ulcer or skin breakdown. Skin:     General: Skin is warm. Capillary Refill: Capillary refill takes less than 2 seconds. Neurological:      Mental Status: She is alert and oriented to person, place, and time. GCS: GCS eye subscore is 4. GCS verbal subscore is 5. GCS motor subscore is 6. Sensory: Sensation is intact. Motor: Motor function is intact.    Psychiatric:         Mood and Affect: Mood normal.         Speech: Speech normal.         Behavior: Behavior normal.       Imaging/Labs:     Abnormal PVRs   ALIN suggests moderate vascular insufficiency of bilateral lower extremities            Assessment and Plan:     Peripheral arterial disease, claudication  · Optimal medical therapy with aspirin and statins  · Start walking regimen to improve overall cardiovascular health  Walk 5 days a week for 5 minutes. Once the pain/numbness starts try to walk thru it for 30 seconds to a minute. Then take a break and finish the time allocated. Every week increase by 5 minutes. Goal is to get to 30-45 minutes a day to improve total walking distance. · 6 month follow up with repeat PVRs    Electronically signed by Anatoly Rose MD on 1/20/22 at 8:54 AM [address]  Office: [phone]  Cell: [phone]  Email: [email or web address]

## 2022-01-27 ENCOUNTER — OFFICE VISIT (OUTPATIENT)
Dept: FAMILY MEDICINE CLINIC | Age: 52
End: 2022-01-27
Payer: COMMERCIAL

## 2022-01-27 VITALS
DIASTOLIC BLOOD PRESSURE: 82 MMHG | BODY MASS INDEX: 39 KG/M2 | WEIGHT: 234.38 LBS | OXYGEN SATURATION: 96 % | SYSTOLIC BLOOD PRESSURE: 110 MMHG | HEART RATE: 84 BPM

## 2022-01-27 DIAGNOSIS — F43.0 ACUTE STRESS REACTION: ICD-10-CM

## 2022-01-27 DIAGNOSIS — I73.9 PAD (PERIPHERAL ARTERY DISEASE) (HCC): ICD-10-CM

## 2022-01-27 DIAGNOSIS — I10 HTN, GOAL BELOW 130/80: ICD-10-CM

## 2022-01-27 DIAGNOSIS — Z79.4 TYPE 2 DIABETES MELLITUS WITH HYPERGLYCEMIA, WITH LONG-TERM CURRENT USE OF INSULIN (HCC): Primary | ICD-10-CM

## 2022-01-27 DIAGNOSIS — E11.65 TYPE 2 DIABETES MELLITUS WITH HYPERGLYCEMIA, WITH LONG-TERM CURRENT USE OF INSULIN (HCC): Primary | ICD-10-CM

## 2022-01-27 DIAGNOSIS — E78.5 DYSLIPIDEMIA: ICD-10-CM

## 2022-01-27 DIAGNOSIS — Z71.3 DIETARY COUNSELING: ICD-10-CM

## 2022-01-27 PROCEDURE — 2022F DILAT RTA XM EVC RTNOPTHY: CPT | Performed by: FAMILY MEDICINE

## 2022-01-27 PROCEDURE — 3046F HEMOGLOBIN A1C LEVEL >9.0%: CPT | Performed by: FAMILY MEDICINE

## 2022-01-27 PROCEDURE — G8427 DOCREV CUR MEDS BY ELIG CLIN: HCPCS | Performed by: FAMILY MEDICINE

## 2022-01-27 PROCEDURE — G8417 CALC BMI ABV UP PARAM F/U: HCPCS | Performed by: FAMILY MEDICINE

## 2022-01-27 PROCEDURE — 3017F COLORECTAL CA SCREEN DOC REV: CPT | Performed by: FAMILY MEDICINE

## 2022-01-27 PROCEDURE — 1036F TOBACCO NON-USER: CPT | Performed by: FAMILY MEDICINE

## 2022-01-27 PROCEDURE — G8482 FLU IMMUNIZE ORDER/ADMIN: HCPCS | Performed by: FAMILY MEDICINE

## 2022-01-27 PROCEDURE — 99214 OFFICE O/P EST MOD 30 MIN: CPT | Performed by: FAMILY MEDICINE

## 2022-01-27 RX ORDER — CLONAZEPAM 0.5 MG/1
0.5 TABLET ORAL 2 TIMES DAILY PRN
Qty: 28 TABLET | Refills: 0 | Status: SHIPPED | OUTPATIENT
Start: 2022-01-27 | End: 2022-07-08

## 2022-01-27 RX ORDER — PRAVASTATIN SODIUM 40 MG
40 TABLET ORAL DAILY
Qty: 90 TABLET | Refills: 1 | Status: SHIPPED | OUTPATIENT
Start: 2022-01-27 | End: 2022-04-21

## 2022-01-27 ASSESSMENT — COLUMBIA-SUICIDE SEVERITY RATING SCALE - C-SSRS
6. HAVE YOU EVER DONE ANYTHING, STARTED TO DO ANYTHING, OR PREPARED TO DO ANYTHING TO END YOUR LIFE?: NO
2. HAVE YOU ACTUALLY HAD ANY THOUGHTS OF KILLING YOURSELF?: NO
1. WITHIN THE PAST MONTH, HAVE YOU WISHED YOU WERE DEAD OR WISHED YOU COULD GO TO SLEEP AND NOT WAKE UP?: NO

## 2022-01-27 ASSESSMENT — PATIENT HEALTH QUESTIONNAIRE - PHQ9
SUM OF ALL RESPONSES TO PHQ QUESTIONS 1-9: 0
SUM OF ALL RESPONSES TO PHQ QUESTIONS 1-9: 0
SUM OF ALL RESPONSES TO PHQ9 QUESTIONS 1 & 2: 0
1. LITTLE INTEREST OR PLEASURE IN DOING THINGS: 0
SUM OF ALL RESPONSES TO PHQ QUESTIONS 1-9: 0
2. FEELING DOWN, DEPRESSED OR HOPELESS: 0
SUM OF ALL RESPONSES TO PHQ QUESTIONS 1-9: 0

## 2022-01-27 NOTE — PROGRESS NOTES
Progress Note    Breana Diaz is a 45 y.o.  female who presents today alone for evaluation of   Chief Complaint   Patient presents with    Diabetes    Hyperlipidemia    Hypertension    Stress           HPI:   Patient is here for follow up on DM/HTN/dyslipidemia. Patient last HbA1c 6.6 9/2021. Patient -140. Patient states she injects 30 units of basalglar daily and 0.5 mg of Ozempic weekly. Patient states she does have n/t in her feet. Patient denies polyuria/polyphagia/polydipsia. Patient states she does have exertional calf cramping. Patient denies cp/sob/le edema/dizziness/lightheadedness/blurry va/ha. Patient denies PND/orthopnea. Patient did see vascular surgery in regards to her PAD. Patient was recommended to be treated medically with annual follow up at this time. Patient states she is under a lot of stress due to her sister living with her currently. Patient states family issues cause her a lot of anxiety. Health Maintenance Due   Topic Date Due    Diabetic foot exam  Never done    Diabetic retinal exam  Never done    Hepatitis B vaccine (1 of 3 - Risk 3-dose series) Never done    Cervical cancer screen  Never done    DTaP/Tdap/Td vaccine (2 - Td or Tdap) 12/06/2021        Current Medications:     Current Outpatient Medications   Medication Sig Dispense Refill    pravastatin (PRAVACHOL) 40 MG tablet Take 1 tablet by mouth daily 90 tablet 1    clonazePAM (KLONOPIN) 0.5 MG tablet Take 1 tablet by mouth 2 times daily as needed for Anxiety for up to 14 days. 28 tablet 0    glimepiride (AMARYL) 2 MG tablet Take 1 tablet by mouth daily (with breakfast) 90 tablet 0    gabapentin (NEURONTIN) 300 MG capsule Take 1 capsule by mouth 3 times daily for 90 days.  270 capsule 0    busPIRone (BUSPAR) 30 MG tablet Take 30 mg by mouth 3 times daily 90 tablet 0    Semaglutide,0.25 or 0.5MG/DOS, (OZEMPIC, 0.25 OR 0.5 MG/DOSE,) 2 MG/1.5ML SOPN DIAL AND INJECT UNDER THE SKIN 0.25 MG or change in bowel movements. Genitourinary: No change in urinary frequency or dysuria. Skin: No rashes or skin lesions. Neurological: No weakness. No headaches. Last Filed Vitals:  /82   Pulse 84   Wt 234 lb 6 oz (106.3 kg)   SpO2 96%   BMI 39.00 kg/m²      Physical Examination:     GENERAL APPEARANCE: in no acute distress, well developed, well nourished. HEAD: normocephalic, atraumatic. EYES: extraocular movement intact (EOMI), pupils equal, round, reactive to light and accommodation. EARS: normal, tympanic membrane intact, clear, auditory canal clear. NOSE: nares patent, no erythema, sinuses nontender bilaterally, no rhinorrhea. ORAL CAVITY: mucosa moist, no lesions. THROAT: clear, no mass, no exudate. NECK/THYROID: neck supple, full range of motion, no thyromegaly. HEART: no murmurs, regular rate and rhythm, S1, S2 normal.   LUNGS: clear to auscultation bilaterally, no wheezes, rales, rhonchi.    ABDOMEN: normal, bowel sounds present, soft, nontender, nondistended, no rebound guarding or rigidity    Recent Labs/ In Office Testing/ Radiograph review:     Hospital Outpatient Visit on 12/20/2021   Component Date Value Ref Range Status    WBC 12/20/2021 10.4  3.5 - 11.3 k/uL Final    RBC 12/20/2021 3.91* 3.95 - 5.11 m/uL Final    Hemoglobin 12/20/2021 11.9  11.9 - 15.1 g/dL Final    Hematocrit 12/20/2021 37.0  36.3 - 47.1 % Final    MCV 12/20/2021 94.6  82.6 - 102.9 fL Final    MCH 12/20/2021 30.4  25.2 - 33.5 pg Final    MCHC 12/20/2021 32.2  28.4 - 34.8 g/dL Final    RDW 12/20/2021 13.2  11.8 - 14.4 % Final    Platelets 61/16/4572 409  138 - 453 k/uL Final    MPV 12/20/2021 10.6  8.1 - 13.5 fL Final    NRBC Automated 12/20/2021 0.0  0.0 per 100 WBC Final    Differential Type 12/20/2021 NOT REPORTED   Final    Seg Neutrophils 12/20/2021 58  36 - 65 % Final    Lymphocytes 12/20/2021 30  24 - 43 % Final    Monocytes 12/20/2021 8  3 - 12 % Final    Eosinophils % 12/20/2021 2  1 - 4 % Final    Basophils 12/20/2021 1  0 - 2 % Final    Immature Granulocytes 12/20/2021 1* 0 % Final    Segs Absolute 12/20/2021 6.02  1.50 - 8.10 k/uL Final    Absolute Lymph # 12/20/2021 3.17  1.10 - 3.70 k/uL Final    Absolute Mono # 12/20/2021 0.88  0.10 - 1.20 k/uL Final    Absolute Eos # 12/20/2021 0.22  0.00 - 0.44 k/uL Final    Basophils Absolute 12/20/2021 0.05  0.00 - 0.20 k/uL Final    Absolute Immature Granulocyte 12/20/2021 0.09  0.00 - 0.30 k/uL Final    WBC Morphology 12/20/2021 NOT REPORTED   Final    RBC Morphology 12/20/2021 NOT REPORTED   Final    Platelet Estimate 95/00/2807 NOT REPORTED   Final    Magnesium 12/20/2021 2.0  1.6 - 2.6 mg/dL Final    Glucose 12/20/2021 147* 70 - 99 mg/dL Final    BUN 12/20/2021 13  6 - 20 mg/dL Final    CREATININE 12/20/2021 0.71  0.50 - 0.90 mg/dL Final    Bun/Cre Ratio 12/20/2021 NOT REPORTED  9 - 20 Final    Calcium 12/20/2021 8.2* 8.6 - 10.4 mg/dL Final    Sodium 12/20/2021 139  135 - 144 mmol/L Final    Potassium 12/20/2021 3.9  3.7 - 5.3 mmol/L Final    Chloride 12/20/2021 103  98 - 107 mmol/L Final    CO2 12/20/2021 22  20 - 31 mmol/L Final    Anion Gap 12/20/2021 14  9 - 17 mmol/L Final    Alkaline Phosphatase 12/20/2021 74  35 - 104 U/L Final    ALT 12/20/2021 40* 5 - 33 U/L Final    AST 12/20/2021 33* <32 U/L Final    Total Bilirubin 12/20/2021 <0.10* 0.3 - 1.2 mg/dL Final    Total Protein 12/20/2021 6.7  6.4 - 8.3 g/dL Final    Albumin 12/20/2021 4.1  3.5 - 5.2 g/dL Final    Albumin/Globulin Ratio 12/20/2021 1.6  1.0 - 2.5 Final    GFR Non- 12/20/2021 >60  >60 mL/min Final    GFR  12/20/2021 >60  >60 mL/min Final    GFR Comment 12/20/2021        Final    Comment: Average GFR for 52-63 years old:   80 mL/min/1.73sq m  Chronic Kidney Disease:   <60 mL/min/1.73sq m  Kidney failure:   <15 mL/min/1.73sq m              eGFR calculated using average adult body mass.  Additional eGFR calculator available at:        [employer]            GFR Staging 12/20/2021 NOT REPORTED   Final       No results found for this visit on 01/27/22. Assessment/Plan:     Kelly Lainez was seen today for diabetes, hyperlipidemia, hypertension and stress. Diagnoses and all orders for this visit:    Type 2 diabetes mellitus with hyperglycemia, with long-term current use of insulin (HCC)  -     Hemoglobin A1C; Future  -     Microalbumin, Ur; Future    HTN, goal below 130/80  -     Magnesium; Future  -     Renal Function Panel; Future    Dyslipidemia  -     pravastatin (PRAVACHOL) 40 MG tablet; Take 1 tablet by mouth daily  -     ALT; Future  -     AST; Future  -     CBC Auto Differential; Future  -     Lipid Panel; Future    BMI 39.0-39.9,adult    PAD (peripheral artery disease) (HCC)  -     Hemoglobin A1C; Future  -     Lipid Panel; Future    Acute stress reaction  -     clonazePAM (KLONOPIN) 0.5 MG tablet; Take 1 tablet by mouth 2 times daily as needed for Anxiety for up to 14 days. Dietary counseling  -      BMI ABOVE NORMAL F/U    Follow up on labs. Encouraged well balanced diet. Encouraged 150 mins of aerobic activity weekly. Continue current medical regimen. Klonopin for acute anxiety. Patient sister will be leaving their home in a few days and hopefull this will alleviate her stress/anxiety. Reviewed vascular consult. Increased statin. Discussed xarelto bid 2.5 mg for PAD citing the COMPASS trial. Patient is going to inquire with her insurance company to see if xarelto is covered. Patient mentioned at the end of the visit she has been diagnosed in the past with BP1. Patient states she has not had any issues for many years. I discussed the signs of acute art and that she should go to the ED if she feels she is developing these. No red flag signs today. No SI/HI.  Discussed when she calls her insurance company about xarelto she should inquire about an in network psychiatrist and we can get her a referral. Patient agrees and understands. All questions answered and addressed to patient satisfaction. Patient understands and agrees to the plan. The patient was evaluated and treated today based on the osteopathic principle that each person is a unit of body, mind, and spirit, the body is capable of self-regulation, self-healing, and health maintenance and that structure and function are reciprocally interrelated. Follow-up:   Return in about 3 months (around 4/27/2022) for CPE; 40 min appt. Guillermo Olmedo D.O.      BMI was elevated today, and weight loss plan recommended is : conventional weight loss.

## 2022-01-27 NOTE — PATIENT INSTRUCTIONS

## 2022-02-01 ENCOUNTER — TELEPHONE (OUTPATIENT)
Dept: FAMILY MEDICINE CLINIC | Age: 52
End: 2022-02-01

## 2022-02-01 NOTE — TELEPHONE ENCOUNTER
[facility] called in regards to the xarelto that was sent in 1/27/22. The directions states it is taken twice daily but this is typically taken once daily with the 2.5mg. The pharmacy wants to know if it can be changed to once daily or you can up the strength to 5mg twice daily but that is not usually covered by insurance. reference # [id number]

## 2022-02-02 ASSESSMENT — ENCOUNTER SYMPTOMS
ALLERGIC/IMMUNOLOGIC NEGATIVE: 1
CHEST TIGHTNESS: 0
SHORTNESS OF BREATH: 0
ABDOMINAL PAIN: 0
COLOR CHANGE: 0

## 2022-02-11 DIAGNOSIS — Z76.0 MEDICATION REFILL: ICD-10-CM

## 2022-02-11 RX ORDER — GABAPENTIN 300 MG/1
CAPSULE ORAL
Qty: 90 CAPSULE | Refills: 0 | Status: SHIPPED | OUTPATIENT
Start: 2022-02-11 | End: 2022-03-17 | Stop reason: SDUPTHER

## 2022-02-11 NOTE — TELEPHONE ENCOUNTER
Fanny Fowler is calling to request a refill on the following medication(s):    Medication Request:  Requested Prescriptions     Pending Prescriptions Disp Refills    gabapentin (NEURONTIN) 300 MG capsule [Pharmacy Med Name: GABAPENTIN 300 MG CAPSULE] 90 capsule      Sig: TAKE ONE CAPSULE BY MOUTH THREE TIMES A DAY       Last Visit Date (If Applicable):  7/38/5715    Next Visit Date:    4/12/2022

## 2022-02-16 DIAGNOSIS — Z76.0 MEDICATION REFILL: ICD-10-CM

## 2022-02-17 RX ORDER — BUSPIRONE HYDROCHLORIDE 30 MG/1
30 TABLET ORAL 3 TIMES DAILY
Qty: 90 TABLET | Refills: 2 | Status: SHIPPED | OUTPATIENT
Start: 2022-02-17 | End: 2022-03-03 | Stop reason: SDUPTHER

## 2022-02-17 NOTE — TELEPHONE ENCOUNTER
Crispin Espinal is calling to request a refill on the following medication(s):    Medication Request:  Requested Prescriptions     Pending Prescriptions Disp Refills    busPIRone (BUSPAR) 30 MG tablet 90 tablet 0     Sig: Take 30 mg by mouth 3 times daily       Last Visit Date (If Applicable):  8/70/8046    Next Visit Date:    4/12/2022

## 2022-02-28 ENCOUNTER — PATIENT MESSAGE (OUTPATIENT)
Dept: FAMILY MEDICINE CLINIC | Age: 52
End: 2022-02-28

## 2022-02-28 NOTE — TELEPHONE ENCOUNTER
From: Bereket Freeman  To: Dr. Jocelynn Yun  Sent: 2/28/2022 11:23 AM EST  Subject: Prescription Question    Hi,    I am trying the mail order prescription my insurance offers to see if I can get 90 days for less. I will be requesting the medicines over next couple weeks.     Thanks   Hussain Davis

## 2022-03-03 DIAGNOSIS — Z76.0 MEDICATION REFILL: ICD-10-CM

## 2022-03-03 RX ORDER — BUSPIRONE HYDROCHLORIDE 30 MG/1
30 TABLET ORAL 3 TIMES DAILY
Qty: 270 TABLET | Refills: 0 | Status: SHIPPED | OUTPATIENT
Start: 2022-03-03 | End: 2022-03-15 | Stop reason: SDUPTHER

## 2022-03-03 NOTE — TELEPHONE ENCOUNTER
Patient needs script sent to [facility] instead of [facility] please. Medication and pharmacy pending.        Crispin Espinal is calling to request a refill on the following medication(s):    Medication Request:  Requested Prescriptions     Pending Prescriptions Disp Refills    busPIRone (BUSPAR) 30 MG tablet 270 tablet 0     Sig: Take 30 mg by mouth 3 times daily       Last Visit Date (If Applicable):  9/52/4973    Next Visit Date:    4/12/2022

## 2022-03-09 ENCOUNTER — OFFICE VISIT (OUTPATIENT)
Dept: NEUROLOGY | Age: 52
End: 2022-03-09
Payer: COMMERCIAL

## 2022-03-09 VITALS
SYSTOLIC BLOOD PRESSURE: 124 MMHG | HEIGHT: 65 IN | DIASTOLIC BLOOD PRESSURE: 76 MMHG | BODY MASS INDEX: 39.49 KG/M2 | HEART RATE: 95 BPM | WEIGHT: 237 LBS

## 2022-03-09 DIAGNOSIS — E08.42 DIABETIC POLYNEUROPATHY ASSOCIATED WITH DIABETES MELLITUS DUE TO UNDERLYING CONDITION (HCC): Primary | ICD-10-CM

## 2022-03-09 PROCEDURE — 2022F DILAT RTA XM EVC RTNOPTHY: CPT | Performed by: PSYCHIATRY & NEUROLOGY

## 2022-03-09 PROCEDURE — G8427 DOCREV CUR MEDS BY ELIG CLIN: HCPCS | Performed by: PSYCHIATRY & NEUROLOGY

## 2022-03-09 PROCEDURE — 1036F TOBACCO NON-USER: CPT | Performed by: PSYCHIATRY & NEUROLOGY

## 2022-03-09 PROCEDURE — G8482 FLU IMMUNIZE ORDER/ADMIN: HCPCS | Performed by: PSYCHIATRY & NEUROLOGY

## 2022-03-09 PROCEDURE — G8417 CALC BMI ABV UP PARAM F/U: HCPCS | Performed by: PSYCHIATRY & NEUROLOGY

## 2022-03-09 PROCEDURE — 3017F COLORECTAL CA SCREEN DOC REV: CPT | Performed by: PSYCHIATRY & NEUROLOGY

## 2022-03-09 PROCEDURE — 99204 OFFICE O/P NEW MOD 45 MIN: CPT | Performed by: PSYCHIATRY & NEUROLOGY

## 2022-03-09 NOTE — PROGRESS NOTES
46 yo wf with bilateral foot numbness . She reports that left foot feels weird numb and cold which has been for the past year . She had EMG which showed that she had neuropathy felt to be from diabetes . She has been diabetic for 17 years being on insulin , ozempic and glymeperide . Numbness began in March of last year affecting underside of her toes . This has progressed affecting entire foot below the ankle being at current state for the past 4 months . There will be tingling of right toes . There is pain in feet of sharp stabbing which is intermittent couple of times a week of grade 7 to 8 over 10 with baseline burning grade 8 over 10 affecting entire left foot and right toes . She had frost bite in her feet 4 years ago . There is numbnes of outer 2 fingers of left hand . There is no hand pain . Last Hga1c was 6.6 being followed by PMD . There is in neck pain of aching quality intermittent going into shoulders. There will be intermittent low back pain acting up for few days every 4 months . She denies bowel or bladder complaints  . She is on neurontin 300 mg po tid attenauting foot pain by 70 %  . Significant medications neurontin 300 mg po tid .  Testing EMG/NCV prolongued sural sensory latency and borderline normal amplitude left perioneal and fibular motor study suggestive of sensory motor polyneuropathy , November 2021      Past Medical History:   Diagnosis Date    Chronic deep vein thrombosis (DVT) of proximal vein of left lower extremity (HCC)     DDD (degenerative disc disease), thoracolumbar     Dyslipidemia     Essential hypertension     Gastroesophageal reflux disease without esophagitis     Mood disorder (HCC)     Neuropathy     Trigeminal neuralgia     Type 2 diabetes mellitus with hyperglycemia, with long-term current use of insulin ([facility])        Past Surgical History:   Procedure Laterality Date    CATARACT REMOVAL WITH IMPLANT      CHOLECYSTECTOMY         Family History   Problem Relation Age of Onset    COPD Mother    Guajardo Other Mother         PAD    Diabetes type 2  Father        Social History     Socioeconomic History    Marital status:      Spouse name: None    Number of children: None    Years of education: None    Highest education level: None   Occupational History    None   Tobacco Use    Smoking status: Former Smoker     Packs/day: 1.00     Years: 15.00     Pack years: 15.00     Start date:      Quit date: 2019     Years since quittin.5    Smokeless tobacco: Never Used    Tobacco comment: estimated quit date   Vaping Use    Vaping Use: Never used   Substance and Sexual Activity    Alcohol use: Never    Drug use: Yes     Types: Marijuana Kevin Latham)    Sexual activity: None   Other Topics Concern    None   Social History Narrative    None     Social Determinants of Health     Financial Resource Strain: Low Risk     Difficulty of Paying Living Expenses: Not hard at all   Food Insecurity: No Food Insecurity    Worried About Running Out of Food in the Last Year: Never true    [place] of Food in the Last Year: Never true   Transportation Needs: No Transportation Needs    Lack of Transportation (Medical): No    Lack of Transportation (Non-Medical):  No   Physical Activity:     Days of Exercise per Week: Not on file    Minutes of Exercise per Session: Not on file   Stress:     Feeling of Stress : Not on file   Social Connections:     Frequency of Communication with Friends and Family: Not on file    Frequency of Social Gatherings with Friends and Family: Not on file    Attends Mandaen Services: Not on file    Active Member of Clubs or Organizations: Not on file    Attends Club or Organization Meetings: Not on file    Marital Status: Not on file   Intimate Partner Violence:     Fear of Current or Ex-Partner: Not on file    Emotionally Abused: Not on file    Physically Abused: Not on file    Sexually Abused: Not on file   Housing Stability:  Unable to Pay for Housing in the Last Year: Not on file    Number of Places Lived in the Last Year: Not on file    Unstable Housing in the Last Year: Not on file       Current Outpatient Medications   Medication Sig Dispense Refill    busPIRone (BUSPAR) 30 MG tablet Take 30 mg by mouth 3 times daily 270 tablet 0    gabapentin (NEURONTIN) 300 MG capsule TAKE ONE CAPSULE BY MOUTH THREE TIMES A DAY 90 capsule 0    pravastatin (PRAVACHOL) 40 MG tablet Take 1 tablet by mouth daily 90 tablet 1    clonazePAM (KLONOPIN) 0.5 MG tablet Take 1 tablet by mouth 2 times daily as needed for Anxiety for up to 14 days. 28 tablet 0    rivaroxaban (XARELTO) 2.5 MG TABS tablet Take 1 tablet by mouth 2 times daily 180 tablet 3    glimepiride (AMARYL) 2 MG tablet Take 1 tablet by mouth daily (with breakfast) 90 tablet 0    Semaglutide,0.25 or 0.5MG/DOS, (OZEMPIC, 0.25 OR 0.5 MG/DOSE,) 2 MG/1.5ML SOPN DIAL AND INJECT UNDER THE SKIN 0.25 MG WEEKLY (Patient taking differently: DIAL AND INJECT UNDER THE SKIN 0.5 MG WEEKLY) 3 pen 1    mometasone (ELOCON) 0.1 % cream Apply topically daily. 50 g 0    insulin glargine (BASAGLAR KWIKPEN) 100 UNIT/ML injection pen Inject 30 Units into the skin every evening 15 pen 2    aspirin 81 MG chewable tablet Take 81 mg by mouth daily      dicyclomine (BENTYL) 20 MG tablet Take 1 tablet by mouth 4 times daily 360 tablet 0     No current facility-administered medications for this visit. Allergies   Allergen Reactions    Latex Hives and Rash     After prolonged contact    After prolonged contact      Amoxicillin Hives    Atorvastatin Other (See Comments)     Stomach pain  Other reaction(s): Other (See Comments), Other (See Comments)  Stomach pain  Stomach pain      Pineapple Itching         Review of Systems     Vitals:    03/09/22 1413   BP: 124/76   Pulse: 95     weight: 237 lb (107.5 kg)      Review of Systems   Constitutional: Negative. HENT: Negative. Eyes: Negative. Respiratory: Negative. Cardiovascular: Negative. Gastrointestinal: Negative. Endocrine: Negative. Genitourinary: Negative. Musculoskeletal: Negative. Skin: Negative. Allergic/Immunologic: Negative. Neurological: Positive for numbness. Hematological: Negative. Psychiatric/Behavioral: Negative. Neurological Examination  Constitutional .General exam well groomed   Head/Ears /Nose/Throat: external ear . Normal exam  Neck and thyroid . Normal size. No bruits  Respiratory . Breathsounds clear bilaterally  Cardiovascular: Auscultation of heart with regular rate and rhythm  Musculoskeletal. Muscle bulk and tone normal                                                           Muscle strength 5/5 strength throughout                                                                                No dysmetria or dysdiadokinesis  No tremor   Normal fine motor  Gait normal   Orientation Alert and oriented x 3   Attention and concentration normal  Short term memory normal  Language process and speech normal . No aphasia   Cranial nerve 2 normal acuety and visual fields  Cranial nerve 3, 4 and 6 . Extraocular muscles are intact . Pupils are equal and reactive   Cranial nerve 5 . Normal strength of masseter and temporalis . Intact corneal reflex. Normal facial sensation  Cranial nerve 7 normal exam   Cranial nerve 8. Grossly intact hearing   Cranial nerve 9 and 10. Symmetric palate elevation   Cranial nerve 11 , 5 out of 5 strength   Cranial Nerve 12 midline tongue . No atrophy  Sensation . Normal proprioception . Decreased Vibration at toe level  . Decreased pinprick and light distklle left ankle and right mid foot  touch   Deep Tendon Reflexes absent ankle jerks   Plantar response flexor bilaterally      ASSESSMENT/PLAN      Diagnosis Orders   1.  Diabetic polyneuropathy associated with diabetes mellitus due to underlying condition (HCC)  Vitamin B12    Sedimentation rate, automated    HUGO    Folate Electrophoresis Protein, Serum    TSH   Patient's neuropathy is mos probable diabetic having good pain control on neurontin to undergo neuropathic bloodwork      Orders Placed This Encounter   Procedures    Vitamin B12     Standing Status:   Future     Standing Expiration Date:   3/9/2023    Sedimentation rate, automated     Standing Status:   Future     Standing Expiration Date:   3/9/2023    HUGO     Standing Status:   Future     Standing Expiration Date:   3/9/2023    Folate     Standing Status:   Future     Standing Expiration Date:   3/9/2023    Electrophoresis Protein, Serum     Standing Status:   Future     Standing Expiration Date:   3/9/2023    TSH     Standing Status:   Future     Standing Expiration Date:   3/9/2023

## 2022-03-10 ENCOUNTER — TELEPHONE (OUTPATIENT)
Dept: FAMILY MEDICINE CLINIC | Age: 52
End: 2022-03-10

## 2022-03-10 DIAGNOSIS — Z76.0 MEDICATION REFILL: ICD-10-CM

## 2022-03-10 DIAGNOSIS — F41.9 ANXIETY: Primary | ICD-10-CM

## 2022-03-10 ASSESSMENT — ENCOUNTER SYMPTOMS
GASTROINTESTINAL NEGATIVE: 1
EYES NEGATIVE: 1
ALLERGIC/IMMUNOLOGIC NEGATIVE: 1
RESPIRATORY NEGATIVE: 1

## 2022-03-15 RX ORDER — BUSPIRONE HYDROCHLORIDE 30 MG/1
30 TABLET ORAL 3 TIMES DAILY
Qty: 270 TABLET | Refills: 0 | Status: SHIPPED | OUTPATIENT
Start: 2022-03-15 | End: 2022-06-15 | Stop reason: SDUPTHER

## 2022-03-15 NOTE — TELEPHONE ENCOUNTER
[facility] Please let pt know she will need to download good rx wan on her phone or the pharmacy usually has cards.

## 2022-03-15 NOTE — TELEPHONE ENCOUNTER
Pt advised. She stated she was going through them because it was cheaper, but shell just stick with [facility] and her regular rx.

## 2022-03-17 DIAGNOSIS — Z76.0 MEDICATION REFILL: ICD-10-CM

## 2022-03-18 RX ORDER — GABAPENTIN 300 MG/1
300 CAPSULE ORAL 3 TIMES DAILY
Qty: 90 CAPSULE | Refills: 0 | Status: SHIPPED | OUTPATIENT
Start: 2022-03-18 | End: 2022-04-08 | Stop reason: SDUPTHER

## 2022-03-18 NOTE — TELEPHONE ENCOUNTER
Alex Wagoner is calling to request a refill on the following medication(s):    Medication Request:  Requested Prescriptions     Pending Prescriptions Disp Refills    gabapentin (NEURONTIN) 300 MG capsule 90 capsule 0     Sig: Take 1 capsule by mouth 3 times daily for 30 days.        Last Visit Date (If Applicable):  3/48/6216    Next Visit Date:    4/21/2022

## 2022-04-06 DIAGNOSIS — E11.65 TYPE 2 DIABETES MELLITUS WITH HYPERGLYCEMIA, WITH LONG-TERM CURRENT USE OF INSULIN (HCC): ICD-10-CM

## 2022-04-06 DIAGNOSIS — Z79.4 TYPE 2 DIABETES MELLITUS WITH HYPERGLYCEMIA, WITH LONG-TERM CURRENT USE OF INSULIN (HCC): ICD-10-CM

## 2022-04-07 RX ORDER — SEMAGLUTIDE 1.34 MG/ML
INJECTION, SOLUTION SUBCUTANEOUS
Qty: 1.5 ML | Refills: 5 | Status: SHIPPED | OUTPATIENT
Start: 2022-04-07 | End: 2022-04-21

## 2022-04-07 NOTE — TELEPHONE ENCOUNTER
[facility]le[facility] Members is calling to request a refill on the following medication(s):    Medication Request:  Requested Prescriptions     Pending Prescriptions Disp Refills    OZEMPIC, 0.25 OR 0.5 MG/DOSE, 2 MG/1.5ML SOPN [Pharmacy Med Name: OZEMPIC 0.25-0.5 MG/DOSE PEN] 1.5 mL      Sig: DIAL AND INJECT UNDER THE SKIN 0.25 MG WEEKLY       Last Visit Date (If Applicable):  0/01/9376    Next Visit Date:    4/21/2022

## 2022-04-08 DIAGNOSIS — Z76.0 MEDICATION REFILL: ICD-10-CM

## 2022-04-08 RX ORDER — GLIMEPIRIDE 2 MG/1
2 TABLET ORAL
Qty: 90 TABLET | Refills: 0 | Status: SHIPPED | OUTPATIENT
Start: 2022-04-08 | End: 2022-07-18

## 2022-04-08 RX ORDER — GABAPENTIN 300 MG/1
300 CAPSULE ORAL 3 TIMES DAILY
Qty: 90 CAPSULE | Refills: 0 | Status: SHIPPED | OUTPATIENT
Start: 2022-04-08 | End: 2022-05-16

## 2022-04-08 NOTE — TELEPHONE ENCOUNTER
[facility] is calling to request a refill on the following medication(s):    Medication Request:  Requested Prescriptions     Pending Prescriptions Disp Refills    glimepiride (AMARYL) 2 MG tablet 90 tablet 0     Sig: Take 1 tablet by mouth daily (with breakfast)    gabapentin (NEURONTIN) 300 MG capsule 90 capsule 0     Sig: Take 1 capsule by mouth 3 times daily for 30 days.        Last Visit Date (If Applicable):  3/72/3184    Next Visit Date:    4/21/2022

## 2022-04-18 ENCOUNTER — HOSPITAL ENCOUNTER (OUTPATIENT)
Age: 52
Setting detail: SPECIMEN
Discharge: HOME | End: 2022-04-18

## 2022-04-18 DIAGNOSIS — E08.42 DIABETIC POLYNEUROPATHY ASSOCIATED WITH DIABETES MELLITUS DUE TO UNDERLYING CONDITION (HCC): ICD-10-CM

## 2022-04-18 DIAGNOSIS — Z79.4 TYPE 2 DIABETES MELLITUS WITH HYPERGLYCEMIA, WITH LONG-TERM CURRENT USE OF INSULIN (HCC): ICD-10-CM

## 2022-04-18 DIAGNOSIS — I10 HTN, GOAL BELOW 130/80: ICD-10-CM

## 2022-04-18 DIAGNOSIS — E11.65 TYPE 2 DIABETES MELLITUS WITH HYPERGLYCEMIA, WITH LONG-TERM CURRENT USE OF INSULIN (HCC): ICD-10-CM

## 2022-04-18 DIAGNOSIS — E78.5 DYSLIPIDEMIA: ICD-10-CM

## 2022-04-18 DIAGNOSIS — I73.9 PAD (PERIPHERAL ARTERY DISEASE) (HCC): ICD-10-CM

## 2022-04-18 LAB
ABSOLUTE EOS #: 0.27 K/UL (ref 0–0.44)
ABSOLUTE IMMATURE GRANULOCYTE: 0.05 K/UL (ref 0–0.3)
ABSOLUTE LYMPH #: 2.94 K/UL (ref 1.1–3.7)
ABSOLUTE MONO #: 0.93 K/UL (ref 0.1–1.2)
ALBUMIN SERPL-MCNC: 4.3 G/DL (ref 3.5–5.2)
ALT SERPL-CCNC: 38 U/L (ref 5–33)
ANION GAP SERPL CALCULATED.3IONS-SCNC: 19 MMOL/L (ref 9–17)
AST SERPL-CCNC: 32 U/L
BASOPHILS # BLD: 1 % (ref 0–2)
BASOPHILS ABSOLUTE: 0.05 K/UL (ref 0–0.2)
BUN BLDV-MCNC: 17 MG/DL (ref 6–20)
CALCIUM SERPL-MCNC: 9 MG/DL (ref 8.6–10.4)
CHLORIDE BLD-SCNC: 101 MMOL/L (ref 98–107)
CHOLESTEROL/HDL RATIO: 6.2
CHOLESTEROL: 217 MG/DL
CO2: 21 MMOL/L (ref 20–31)
CREAT SERPL-MCNC: 0.84 MG/DL (ref 0.5–0.9)
CREATININE URINE: 130.6 MG/DL (ref 28–217)
EOSINOPHILS RELATIVE PERCENT: 3 % (ref 1–4)
ESTIMATED AVERAGE GLUCOSE: 186 MG/DL
FOLATE: 17 NG/ML
GFR AFRICAN AMERICAN: >60 ML/MIN
GFR NON-AFRICAN AMERICAN: >60 ML/MIN
GFR SERPL CREATININE-BSD FRML MDRD: ABNORMAL ML/MIN/{1.73_M2}
GLUCOSE BLD-MCNC: 194 MG/DL (ref 70–99)
HBA1C MFR BLD: 8.1 % (ref 4–6)
HCT VFR BLD CALC: 42.7 % (ref 36.3–47.1)
HDLC SERPL-MCNC: 35 MG/DL
HEMOGLOBIN: 13.4 G/DL (ref 11.9–15.1)
IMMATURE GRANULOCYTES: 1 %
LDL CHOLESTEROL DIRECT: 116 MG/DL
LDL CHOLESTEROL: ABNORMAL MG/DL (ref 0–130)
LYMPHOCYTES # BLD: 27 % (ref 24–43)
MAGNESIUM: 1.8 MG/DL (ref 1.6–2.6)
MCH RBC QN AUTO: 29.3 PG (ref 25.2–33.5)
MCHC RBC AUTO-ENTMCNC: 31.4 G/DL (ref 28.4–34.8)
MCV RBC AUTO: 93.2 FL (ref 82.6–102.9)
MICROALBUMIN/CREAT 24H UR: <12 MG/L
MICROALBUMIN/CREAT UR-RTO: NORMAL MCG/MG CREAT
MONOCYTES # BLD: 9 % (ref 3–12)
NRBC AUTOMATED: 0 PER 100 WBC
PDW BLD-RTO: 13.1 % (ref 11.8–14.4)
PHOSPHORUS: 2.9 MG/DL (ref 2.6–4.5)
PLATELET # BLD: 319 K/UL (ref 138–453)
PMV BLD AUTO: 10.9 FL (ref 8.1–13.5)
POTASSIUM SERPL-SCNC: 4 MMOL/L (ref 3.7–5.3)
RBC # BLD: 4.58 M/UL (ref 3.95–5.11)
SEDIMENTATION RATE, ERYTHROCYTE: 5 MM/HR (ref 0–30)
SEG NEUTROPHILS: 59 % (ref 36–65)
SEGMENTED NEUTROPHILS ABSOLUTE COUNT: 6.54 K/UL (ref 1.5–8.1)
SODIUM BLD-SCNC: 141 MMOL/L (ref 135–144)
TRIGL SERPL-MCNC: 484 MG/DL
TSH SERPL DL<=0.05 MIU/L-ACNC: 1.55 UIU/ML (ref 0.3–5)
VITAMIN B-12: 320 PG/ML (ref 232–1245)
WBC # BLD: 10.8 K/UL (ref 3.5–11.3)

## 2022-04-19 LAB
ANTI DNA DOUBLE STRANDED: <0.5 IU/ML
ANTI-NUCLEAR ANTIBODY (ANA): NEGATIVE
ENA ANTIBODIES SCREEN: 0.2 U/ML

## 2022-04-20 LAB
ALBUMIN (CALCULATED): 4.4 G/DL (ref 3.2–5.2)
ALBUMIN PERCENT: 66 % (ref 45–65)
ALPHA 1 PERCENT: 2 % (ref 3–6)
ALPHA 2 PERCENT: 13 % (ref 6–13)
ALPHA-1-GLOBULIN: 0.2 G/DL (ref 0.1–0.4)
ALPHA-2-GLOBULIN: 0.8 G/DL (ref 0.5–0.9)
BETA GLOBULIN: 0.7 G/DL (ref 0.5–1.1)
BETA PERCENT: 11 % (ref 11–19)
GAMMA GLOBULIN %: 9 % (ref 9–20)
GAMMA GLOBULIN: 0.6 G/DL (ref 0.5–1.5)
PATHOLOGIST: ABNORMAL
PROTEIN ELECTROPHORESIS, SERUM: ABNORMAL
TOTAL PROT. SUM,%: 101 % (ref 98–102)
TOTAL PROT. SUM: 6.7 G/DL (ref 6.3–8.2)
TOTAL PROTEIN: 6.7 G/DL (ref 6.4–8.3)

## 2022-04-21 ENCOUNTER — OFFICE VISIT (OUTPATIENT)
Dept: FAMILY MEDICINE CLINIC | Age: 52
End: 2022-04-21
Payer: COMMERCIAL

## 2022-04-21 VITALS
BODY MASS INDEX: 39.85 KG/M2 | DIASTOLIC BLOOD PRESSURE: 80 MMHG | SYSTOLIC BLOOD PRESSURE: 116 MMHG | HEART RATE: 84 BPM | WEIGHT: 239.5 LBS | OXYGEN SATURATION: 96 %

## 2022-04-21 DIAGNOSIS — Z71.3 DIETARY COUNSELING: ICD-10-CM

## 2022-04-21 DIAGNOSIS — I10 HTN, GOAL BELOW 130/80: ICD-10-CM

## 2022-04-21 DIAGNOSIS — Z71.82 EXERCISE COUNSELING: ICD-10-CM

## 2022-04-21 DIAGNOSIS — Z12.31 BREAST CANCER SCREENING BY MAMMOGRAM: ICD-10-CM

## 2022-04-21 DIAGNOSIS — E78.5 DYSLIPIDEMIA: ICD-10-CM

## 2022-04-21 DIAGNOSIS — Z79.4 TYPE 2 DIABETES MELLITUS WITH HYPERGLYCEMIA, WITH LONG-TERM CURRENT USE OF INSULIN (HCC): ICD-10-CM

## 2022-04-21 DIAGNOSIS — Z00.00 WELL ADULT EXAM: Primary | ICD-10-CM

## 2022-04-21 DIAGNOSIS — E11.65 TYPE 2 DIABETES MELLITUS WITH HYPERGLYCEMIA, WITH LONG-TERM CURRENT USE OF INSULIN (HCC): ICD-10-CM

## 2022-04-21 DIAGNOSIS — K21.9 GASTROESOPHAGEAL REFLUX DISEASE WITHOUT ESOPHAGITIS: ICD-10-CM

## 2022-04-21 PROCEDURE — 99396 PREV VISIT EST AGE 40-64: CPT | Performed by: FAMILY MEDICINE

## 2022-04-21 PROCEDURE — 99214 OFFICE O/P EST MOD 30 MIN: CPT | Performed by: FAMILY MEDICINE

## 2022-04-21 PROCEDURE — G8417 CALC BMI ABV UP PARAM F/U: HCPCS | Performed by: FAMILY MEDICINE

## 2022-04-21 RX ORDER — OMEPRAZOLE 20 MG/1
20 CAPSULE, DELAYED RELEASE ORAL
Qty: 180 CAPSULE | Refills: 1 | Status: SHIPPED | OUTPATIENT
Start: 2022-04-21 | End: 2022-09-09 | Stop reason: SDUPTHER

## 2022-04-21 RX ORDER — PRAVASTATIN SODIUM 80 MG/1
80 TABLET ORAL DAILY
Qty: 90 TABLET | Refills: 1 | Status: SHIPPED | OUTPATIENT
Start: 2022-04-21 | End: 2022-09-09 | Stop reason: SDUPTHER

## 2022-04-21 RX ORDER — SEMAGLUTIDE 1.34 MG/ML
INJECTION, SOLUTION SUBCUTANEOUS
Qty: 9 ML | Refills: 3 | Status: SHIPPED | OUTPATIENT
Start: 2022-04-21 | End: 2022-07-22

## 2022-04-21 SDOH — ECONOMIC STABILITY: TRANSPORTATION INSECURITY
IN THE PAST 12 MONTHS, HAS THE LACK OF TRANSPORTATION KEPT YOU FROM MEDICAL APPOINTMENTS OR FROM GETTING MEDICATIONS?: NO

## 2022-04-21 SDOH — ECONOMIC STABILITY: FOOD INSECURITY: WITHIN THE PAST 12 MONTHS, THE FOOD YOU BOUGHT JUST DIDN'T LAST AND YOU DIDN'T HAVE MONEY TO GET MORE.: NEVER TRUE

## 2022-04-21 SDOH — ECONOMIC STABILITY: FOOD INSECURITY: WITHIN THE PAST 12 MONTHS, YOU WORRIED THAT YOUR FOOD WOULD RUN OUT BEFORE YOU GOT MONEY TO BUY MORE.: NEVER TRUE

## 2022-04-21 ASSESSMENT — PATIENT HEALTH QUESTIONNAIRE - PHQ9
8. MOVING OR SPEAKING SO SLOWLY THAT OTHER PEOPLE COULD HAVE NOTICED. OR THE OPPOSITE, BEING SO FIGETY OR RESTLESS THAT YOU HAVE BEEN MOVING AROUND A LOT MORE THAN USUAL: 0
SUM OF ALL RESPONSES TO PHQ QUESTIONS 1-9: 1
4. FEELING TIRED OR HAVING LITTLE ENERGY: 0
6. FEELING BAD ABOUT YOURSELF - OR THAT YOU ARE A FAILURE OR HAVE LET YOURSELF OR YOUR FAMILY DOWN: 0
SUM OF ALL RESPONSES TO PHQ9 QUESTIONS 1 & 2: 0
9. THOUGHTS THAT YOU WOULD BE BETTER OFF DEAD, OR OF HURTING YOURSELF: 0
5. POOR APPETITE OR OVEREATING: 0
2. FEELING DOWN, DEPRESSED OR HOPELESS: 0
1. LITTLE INTEREST OR PLEASURE IN DOING THINGS: 0
SUM OF ALL RESPONSES TO PHQ QUESTIONS 1-9: 1
SUM OF ALL RESPONSES TO PHQ QUESTIONS 1-9: 1
3. TROUBLE FALLING OR STAYING ASLEEP: 0
10. IF YOU CHECKED OFF ANY PROBLEMS, HOW DIFFICULT HAVE THESE PROBLEMS MADE IT FOR YOU TO DO YOUR WORK, TAKE CARE OF THINGS AT HOME, OR GET ALONG WITH OTHER PEOPLE: 0
7. TROUBLE CONCENTRATING ON THINGS, SUCH AS READING THE NEWSPAPER OR WATCHING TELEVISION: 1
SUM OF ALL RESPONSES TO PHQ QUESTIONS 1-9: 1

## 2022-04-21 ASSESSMENT — SOCIAL DETERMINANTS OF HEALTH (SDOH): HOW HARD IS IT FOR YOU TO PAY FOR THE VERY BASICS LIKE FOOD, HOUSING, MEDICAL CARE, AND HEATING?: NOT HARD AT ALL

## 2022-04-21 NOTE — PATIENT INSTRUCTIONS
Learning About Obesity  What is obesity? Obesity means having an unhealthy amount of body fat. This puts your health in danger. It can lead to other health problems, such as type 2 diabetes and highblood pressure. How do you know if your weight is in the obesity range? To know if your weight is in the obesity range, your doctor looks at your bodymass index (BMI) and waist size. BMI is a number that is calculated from your weight and your height. To figure out your BMI for yourself, you can use an online tool, such as http://www.KitLocate/ on Skeeble. If your BMI is 30.0 or higher, it falls within the obesity range. Keep in mind that BMI and waist size are only guides. They are not tools to determine yourideal body weight. What causes obesity? When you take in more calories than you burn off, you gain weight. How you eat, how active you are, and other things affect how your body uses calories andwhether you gain weight. If you have family members who have too much body fat, you may have inherited a tendency to gain weight. And your family also helps form your eating andlifestyle habits, which can lead to obesity. Also, our busy lives make it harder to plan and cook healthy meals. For many of us, it's easier to reach for prepared foods, go out to eat, or go to the drive-through. But these foods are often high in saturated fat and calories. Portions are often too large. What can you do to reach a healthy weight? Focus on health, not diets. Diets are hard to stay on and don't work in the long run. It is very hard tostay with a diet that includes lots of big changes in your eating habits. Instead of a diet, focus on lifestyle changes that will improve your health and achieve the right balance of energy and calories. To lose weight, you need to burn more calories than you take in.  You can do it by eating healthy foods in reasonable amounts and becoming more active, even a little bit every day. Making small changes over time can add up to a lot. Make a plan for change. Many people have found that naming their reasons for change and staying focused on their plan can make a big difference. Work with your doctor tocreate a plan that is right for you.  Ask yourself: River Muhammad are my personal, most powerful reasons for wanting this change? What will my life look like when I've made the change? \"   Set your long-term goal. Make it specific, such as \"I will lose x pounds. \"  Yang Locker your long-term goal into smaller, short-term goals. Make these small steps specific and within your reach, things you know you can do. These steps are what keep you going from day to day. Talk with your doctor about other weight-loss options. If you have a BMI in a certain range and have not been able to lose weight with diet and exercise, medicine or surgery may be an option for you. Before your doctor will prescribe medicines or surgery, he or she will probably want you to be more active and follow your healthy eating plan for a period of time. These habits are key lifelong changes for managing your weight, with or without other medical treatment. And these changes can help you avoid weight-relatedhealth problems. How can you stay on your plan for change? Be ready. Choose to start during a time when there are few events like holidays, socialevents, and high-stress periods. These events might trigger slip-ups. Decide on your first few steps. Most people have more success when they make small changes, one step at a time. For example, you might switch a daily candy bar to a piece of fruit, walk10 minutes more, or add more vegetables to a meal.  Line up your support people. Make sure you're not going to be alone as you make this change. Connect with people who understand how important it is to you.  Ask family members and friends for help in keeping with your plan. And think about who could make itharder for you, and how to handle them. Try tracking. People who keep track of what they eat, feel, and do are better at losingweight. Try writing down things like:   What and how much you eat.  How you feel before and after each meal.   Details about each meal (like eating out or at home, eating alone, or with friends or family).  What you do to be active. Look and plan. As you track, look for patterns that you may want to change. Take note of:   When you eat and whether you skip meals.  How often you eat out.  How many fruits and vegetables you eat.  When you eat beyond feeling full.  When and why you eat for reasons other than being hungry. When you stray from your plan, don't get upset. Figure out what made you slipup and how you can fix it. Can you take medicines or have surgery to lose weight? If you have a BMI in a certain range and have not been able to lose weight withdiet and exercise, medicine or surgery may be an option for you. If you have a BMI of at least 30.0 (or a BMI of at least 27.0 and another health problem related to your weight), ask your doctor about weight-loss medicines. They work by making you feel less hungry, making you feel full more quickly, or changing how you digest fat. Medicines are used along with dietchanges and more physical activity to help you make lasting changes. If you have a BMI of 40.0 or more (or a BMI of 35.0 or more and another health problem related to your weight), your doctor may talk with you about surgery. Weight-loss surgery has risks, and you will need to work with your doctor tocompare the risk of having obesity with the risks of surgery. With any option you choose, you will still need to eat a healthy diet and getregular exercise. Follow-up care is a key part of your treatment and safety. Be sure to make and go to all appointments, and call your doctor if you are having problems.  It's also a good idea to know your test results and keep alist of the medicines you take. Where can you learn more? Go to [email or web address] and sign in to your Nor1 account. Enter N111 in the [facility] box to learn more about \"Learning About Obesity. \"     If you do not have an account, please click on the \"Sign Up Now\" link. Current as of: December 27, 2021               Content Version: 13.2  © 2112-2150 Healthwise, Incorporated. Care instructions adapted under license by [facility] ([facility]). If you have questions about a medical condition or this instruction, always ask your healthcare professional. [address] any warranty or liability for your use of this information.

## 2022-04-21 NOTE — PROGRESS NOTES
Progress Note    Rosa Gay is a 45 y.o.  female who presents today alone for evaluation of   Chief Complaint   Patient presents with    Annual Exam           HPI:   Patient is here for CPE today. Patient PMH DM type 2 IDDM, HTN, dyslipidemia, h/o DVT, DDD, OA, obesity, polyneuropathy 2/2 DM, GERD, mood disorder, and seasonal allergies. Patient [address] [place] GB removal and cataract removal. Patient fhx dad DM and mom COPD/PAD. Patient is a former tobacco smoker. Patient denies regular EtOH consumption. Patient denies illicits. Patient denies SIG E CAPS. Patient denies SI/HI. Patient denies anxiety. Patient admits to a low carb diet. Patient states she does walk and ride a stationary bike daily. Patient last mammogram 2021 and was negative. Patient had negative cologuard 2021 and was negative. Patient states she had a PAP about 2-3 yrs ago. Patient is due for PAP. PHQ-9 Total Score: 1 (4/21/2022  2:08 PM)  Thoughts that you would be better off dead, or of hurting yourself in some way: 0 (4/21/2022  2:08 PM)    Patient last HbA1c about 8.1 4/2022. Patient -225. Patient states she takes 30 units of meal time basalglar daily. Patient states she does have n/t in her feet. Patient denies polyuria/polyphagia/polydipsia. Patient denies exertional calf cramping. Patient is due for BRIT. Patient denies cp/sob/le edema/dizziness/lightheadedness/blurry va/ha. Patient denies PND/orthopnea. Patient has been having increased acid reflux and would like to try PPI. Patient denies blood in stool or dark tarry stools. Patient denies abd pain.     Health Maintenance Due   Topic Date Due    Diabetic foot exam  Never done    Diabetic retinal exam  Never done    Hepatitis B vaccine (1 of 3 - Risk 3-dose series) Never done    Cervical cancer screen  Never done    DTaP/Tdap/Td vaccine (2 - Td or Tdap) 12/06/2021        Current Medications:     Current Outpatient Medications   Medication Sig Dispense Refill    Cholecalciferol (DIALYVITE VITAMIN D 5000 PO) Take by mouth daily      Multiple Vitamins-Minerals (ONE-A-DAY WOMENS PO) Take by mouth      pravastatin (PRAVACHOL) 80 MG tablet Take 1 tablet by mouth daily 90 tablet 1    Semaglutide, 1 MG/DOSE, (OZEMPIC, 1 MG/DOSE,) 4 MG/3ML SOPN Inject 1 mg into the skin once weekly 9 mL 3    omeprazole (PRILOSEC) 20 MG delayed release capsule Take 1 capsule by mouth 2 times daily (before meals) 180 capsule 1    glimepiride (AMARYL) 2 MG tablet Take 1 tablet by mouth daily (with breakfast) 90 tablet 0    gabapentin (NEURONTIN) 300 MG capsule Take 1 capsule by mouth 3 times daily for 30 days. 90 capsule 0    busPIRone (BUSPAR) 30 MG tablet Take 30 mg by mouth 3 times daily 270 tablet 0    clonazePAM (KLONOPIN) 0.5 MG tablet Take 1 tablet by mouth 2 times daily as needed for Anxiety for up to 14 days. 28 tablet 0    rivaroxaban (XARELTO) 2.5 MG TABS tablet Take 1 tablet by mouth 2 times daily 180 tablet 3    mometasone (ELOCON) 0.1 % cream Apply topically daily. 50 g 0    insulin glargine (BASAGLAR KWIKPEN) 100 UNIT/ML injection pen Inject 30 Units into the skin every evening 15 pen 2    aspirin 81 MG chewable tablet Take 81 mg by mouth daily      dicyclomine (BENTYL) 20 MG tablet Take 1 tablet by mouth 4 times daily 360 tablet 0     No current facility-administered medications for this visit. Allergies: Allergies   Allergen Reactions    Latex Hives and Rash     After prolonged contact    After prolonged contact      Amoxicillin Hives    Atorvastatin Other (See Comments)     Stomach pain  Other reaction(s):  Other (See Comments), Other (See Comments)  Stomach pain  Stomach pain      Pineapple Itching        Medical History:     Past Medical History:   Diagnosis Date    Chronic deep vein thrombosis (DVT) of proximal vein of left lower extremity ([address]     DDD (degenerative disc disease), thoracolumbar     Dyslipidemia     Essential hypertension     Gastroesophageal reflux disease without esophagitis     Mood disorder (HCC)     Neuropathy     Trigeminal neuralgia     Type 2 diabetes mellitus with hyperglycemia, with long-term current use of insulin (HCC)        Past Surgical History:   Procedure Laterality Date    CATARACT REMOVAL WITH IMPLANT      CHOLECYSTECTOMY         Family History   Problem Relation Age of Onset    COPD Mother     Other Mother         PAD    Diabetes type 2  Father         Social History:     Social History     Socioeconomic History    Marital status:      Spouse name: Not on file    Number of children: Not on file    Years of education: Not on file    Highest education level: Not on file   Occupational History    Not on file   Tobacco Use    Smoking status: Former Smoker     Packs/day: 1.00     Years: 15.00     Pack years: 15.00     Start date:      Quit date: 2019     Years since quittin.6    Smokeless tobacco: Never Used    Tobacco comment: estimated quit date   Vaping Use    Vaping Use: Never used   Substance and Sexual Activity    Alcohol use: Never    Drug use: Yes     Types: Marijuana [place])    Sexual activity: Not on file   Other Topics Concern    Not on file   Social History Narrative    Not on file     Social Determinants of Health     Financial Resource Strain: Low Risk     Difficulty of Paying Living Expenses: Not hard at all   Food Insecurity: No Food Insecurity    Worried About Running Out of Food in the Last Year: Never true    [place] of Food in the Last Year: Never true   Transportation Needs: No Transportation Needs    Lack of Transportation (Medical): No    Lack of Transportation (Non-Medical):  No   Physical Activity:     Days of Exercise per Week: Not on file    Minutes of Exercise per Session: Not on file   Stress:     Feeling of Stress : Not on file   Social Connections:     Frequency of Communication with Friends and Family: Not on file    Frequency of Social Gatherings with Friends and Family: Not on file    Attends Scientology Services: Not on file    Active Member of Clubs or Organizations: Not on file    Attends Club or Organization Meetings: Not on file    Marital Status: Not on file   Intimate Partner Violence:     Fear of Current or Ex-Partner: Not on file    Emotionally Abused: Not on file    Physically Abused: Not on file    Sexually Abused: Not on file   Housing Stability:     Unable to Pay for Housing in the Last Year: Not on file    Number of [place] in the Last Year: Not on file    Unstable Housing in the Last Year: Not on file        ROS:     Constitutional: No fevers, chills, fatigue. ENT: No nasal congestion or sore throat  Respiratory: No difficulty in breathing or cough. Cardiovascular: No chest pain, palpitations or shortness of breath  Gastrointestinal: No abdominal pain or change in bowel movements. +GONZALES  Genitourinary: No change in urinary frequency or dysuria. Skin: No rashes or skin lesions. Neurological: No weakness. No headaches. Last Filed Vitals:  /80   Pulse 84   Wt 239 lb 8 oz (108.6 kg)   SpO2 96%   BMI 39.85 kg/m²      Physical Examination:     GENERAL APPEARANCE: in no acute distress, well developed, well nourished. HEAD: normocephalic, atraumatic. EYES: extraocular movement intact (EOMI), pupils equal, round, reactive to light and accommodation. EARS: normal, tympanic membrane intact, clear, auditory canal clear. NOSE: nares patent, no erythema, sinuses nontender bilaterally, no rhinorrhea. ORAL CAVITY: mucosa moist, no lesions. THROAT: clear, no mass, no exudate. NECK/THYROID: neck supple, full range of motion, no thyromegaly. HEART: no murmurs, regular rate and rhythm, S1, S2 normal.   LUNGS: clear to auscultation bilaterally, no wheezes, rales, rhonchi.    ABDOMEN: normal, bowel sounds present, soft, nontender, nondistended, no rebound guarding or rigidity    Recent Labs/ In Office Testing/ Radiograph review:     Hospital Outpatient Visit on 04/18/2022   Component Date Value Ref Range Status    ALT 04/18/2022 38* 5 - 33 U/L Final    AST 04/18/2022 32* <32 U/L Final    WBC 04/18/2022 10.8  3.5 - 11.3 k/uL Final    RBC 04/18/2022 4.58  3.95 - 5.11 m/uL Final    Hemoglobin 04/18/2022 13.4  11.9 - 15.1 g/dL Final    Hematocrit 04/18/2022 42.7  36.3 - 47.1 % Final    MCV 04/18/2022 93.2  82.6 - 102.9 fL Final    MCH 04/18/2022 29.3  25.2 - 33.5 pg Final    MCHC 04/18/2022 31.4  28.4 - 34.8 g/dL Final    RDW 04/18/2022 13.1  11.8 - 14.4 % Final    Platelets 10/10/1085 319  138 - 453 k/uL Final    MPV 04/18/2022 10.9  8.1 - 13.5 fL Final    NRBC Automated 04/18/2022 0.0  0.0 per 100 WBC Final    Seg Neutrophils 04/18/2022 59  36 - 65 % Final    Lymphocytes 04/18/2022 27  24 - 43 % Final    Monocytes 04/18/2022 9  3 - 12 % Final    Eosinophils % 04/18/2022 3  1 - 4 % Final    Basophils 04/18/2022 1  0 - 2 % Final    Immature Granulocytes 04/18/2022 1* 0 % Final    Segs Absolute 04/18/2022 6.54  1.50 - 8.10 k/uL Final    Absolute Lymph # 04/18/2022 2.94  1.10 - 3.70 k/uL Final    Absolute Mono # 04/18/2022 0.93  0.10 - 1.20 k/uL Final    Absolute Eos # 04/18/2022 0.27  0.00 - 0.44 k/uL Final    Basophils Absolute 04/18/2022 0.05  0.00 - 0.20 k/uL Final    Absolute Immature Granulocyte 04/18/2022 0.05  0.00 - 0.30 k/uL Final    Hemoglobin A1C 04/18/2022 8.1* 4.0 - 6.0 % Final    Estimated Avg Glucose 04/18/2022 186  mg/dL Final    Comment: The ADA and AACC recommend providing the estimated average glucose result to permit better   patient understanding of their HBA1c result.       Cholesterol 04/18/2022 217* <200 mg/dL Final    Comment:    Cholesterol Guidelines:      <200  Desirable   200-240  Borderline      >240  Undesirable         HDL 04/18/2022 35* >40 mg/dL Final    Comment:    HDL Guidelines:    <40     Undesirable   40-59    Borderline    >59     Desirable         LDL Cholesterol 04/18/2022       0 - 130 mg/dL Final    Comment: Calculation not valid for Triglyceride value greater than 400 mg/dL. Direct LDL reflexed           LDL Guidelines:     <100    Desirable   100-129   Near to/above Desirable   130-159   Borderline      >159   Undesirable     Direct (measured) LDL and calculated LDL are not interchangeable tests.  Chol/HDL Ratio 04/18/2022 6.2* <5 Final            Triglycerides 04/18/2022 484* <150 mg/dL Final    Comment:    Triglyceride Guidelines:     <150   Desirable   150-199  Borderline   200-499  High     >499   Very high   Based on AHA Guidelines for fasting triglyceride, October 2012.  Magnesium 04/18/2022 1.8  1.6 - 2.6 mg/dL Final    Microalb, Ur 04/18/2022 <12  <21 mg/L Final    Creatinine, Ur 04/18/2022 130.6  28.0 - 217.0 mg/dL Final    Microalb/Crt. Ratio 04/18/2022 Can not be calculated  <25 mcg/mg creat Final    Glucose 04/18/2022 194* 70 - 99 mg/dL Final    BUN 04/18/2022 17  6 - 20 mg/dL Final    CREATININE 04/18/2022 0.84  0.50 - 0.90 mg/dL Final    Calcium 04/18/2022 9.0  8.6 - 10.4 mg/dL Final    Albumin 04/18/2022 4.3  3.5 - 5.2 g/dL Final    Phosphorus 04/18/2022 2.9  2.6 - 4.5 mg/dL Final    Sodium 04/18/2022 141  135 - 144 mmol/L Final    Potassium 04/18/2022 4.0  3.7 - 5.3 mmol/L Final    Chloride 04/18/2022 101  98 - 107 mmol/L Final    CO2 04/18/2022 21  20 - 31 mmol/L Final    Anion Gap 04/18/2022 19* 9 - 17 mmol/L Final    GFR Non- 04/18/2022 >60  >60 mL/min Final    GFR  04/18/2022 >60  >60 mL/min Final    GFR Comment 04/18/2022        Final    Comment: Average GFR for 52-63 years old:   80 mL/min/1.73sq m  Chronic Kidney Disease:   <60 mL/min/1.73sq m  Kidney failure:   <15 mL/min/1.73sq m              eGFR calculated using average adult body mass.  Additional eGFR calculator available at:        [email or web address]            LDL Direct 04/18/2022 116* <100 mg/dL Final       No results found for this visit on 04/21/22. Assessment/Plan:     Chelsea Reyes was seen today for annual exam.    Diagnoses and all orders for this visit:    Well adult exam    BMI 39.0-39.9,adult    Exercise counseling    Dietary counseling  -      BMI ABOVE NORMAL F/U    Type 2 diabetes mellitus with hyperglycemia, with long-term current use of insulin (HCC)  -     Hemoglobin A1C; Future  -     Microalbumin, Ur; Future  -     Semaglutide, 1 MG/DOSE, (OZEMPIC, 1 MG/DOSE,) 4 MG/3ML SOPN; Inject 1 mg into the skin once weekly    HTN, goal below 130/80  -     Magnesium; Future  -     Renal Function Panel; Future    Dyslipidemia  -     ALT; Future  -     AST; Future  -     CBC with Auto Differential; Future  -     Lipid Panel; Future  -     TSH with Reflex; Future  -     pravastatin (PRAVACHOL) 80 MG tablet; Take 1 tablet by mouth daily    Breast cancer screening by mammogram  -     EBER DIGITAL SCREEN W OR WO CAD BILATERAL; Future    Gastroesophageal reflux disease without esophagitis  -     omeprazole (PRILOSEC) 20 MG delayed release capsule; Take 1 capsule by mouth 2 times daily (before meals)    Follow up on labs and imaging. Refills as above. Increase Ozempic to 1 mg for tighter glycemic control. Encouraged well balanced low carb diet. Encouraged 150 mins of aerobic activity weekly. Increase pravastatin to 80 mg for tighter LDL control. Add PPI for GERD control. All questions answered and addressed to patient satisfaction. Patient understands and agrees to the plan. The patient was evaluated and treated today based on the osteopathic principle that each person is a unit of body, mind, and spirit, the body is capable of self-regulation, self-healing, and health maintenance and that structure and function are reciprocally interrelated. Follow-up:   Return in about 3 months (around 7/21/2022) for dm; 20 min appt.       Wayne Smith D.O.      BMI was elevated today, and weight loss plan recommended is : conventional weight loss.

## 2022-05-07 DIAGNOSIS — Z79.4 TYPE 2 DIABETES MELLITUS WITH HYPERGLYCEMIA, WITH LONG-TERM CURRENT USE OF INSULIN (HCC): ICD-10-CM

## 2022-05-07 DIAGNOSIS — E11.65 TYPE 2 DIABETES MELLITUS WITH HYPERGLYCEMIA, WITH LONG-TERM CURRENT USE OF INSULIN (HCC): ICD-10-CM

## 2022-05-09 RX ORDER — INSULIN GLARGINE 100 [IU]/ML
INJECTION, SOLUTION SUBCUTANEOUS
Qty: 15 ML | Refills: 5 | Status: SHIPPED | OUTPATIENT
Start: 2022-05-09

## 2022-05-09 NOTE — TELEPHONE ENCOUNTER
Bharat Wheeler is calling to request a refill on the following medication(s):    Medication Request:  Requested Prescriptions     Pending Prescriptions Disp Refills    BASAGLAR KWIKPEN 100 UNIT/ML injection pen [Pharmacy Med Name: BASAGLAR 100 UNIT/ML KWIKPEN] 15 mL      Sig: INJECT 30 [address]       Last Visit Date (If Applicable):  7/81/2237    Next Visit Date:    7/22/2022

## 2022-05-10 ENCOUNTER — HOSPITAL ENCOUNTER (OUTPATIENT)
Age: 52
Setting detail: SPECIMEN
Discharge: HOME | End: 2022-05-10

## 2022-05-10 ENCOUNTER — OFFICE VISIT (OUTPATIENT)
Dept: FAMILY MEDICINE CLINIC | Age: 52
End: 2022-05-10
Payer: COMMERCIAL

## 2022-05-10 VITALS
TEMPERATURE: 97 F | HEART RATE: 83 BPM | DIASTOLIC BLOOD PRESSURE: 88 MMHG | WEIGHT: 239 LBS | OXYGEN SATURATION: 97 % | SYSTOLIC BLOOD PRESSURE: 142 MMHG | BODY MASS INDEX: 39.77 KG/M2

## 2022-05-10 DIAGNOSIS — Z01.419 WELL WOMAN EXAM WITH ROUTINE GYNECOLOGICAL EXAM: Primary | ICD-10-CM

## 2022-05-10 PROCEDURE — 99396 PREV VISIT EST AGE 40-64: CPT | Performed by: NURSE PRACTITIONER

## 2022-05-10 NOTE — PROGRESS NOTES
LISA Palumbo  [address]  [address]  [phone]  [phone]    Annual GYN Visit      Kleber Marvin  YOB: 1970    Date of Service:  5/10/2022    Chief Complaint:   Kleber Marvin is a 45 y.o. female who presents for routine annual gynecologic visit. HPI:  Patient is postmenopausal- No LMP recorded. Patient is postmenopausal..  She denies pelvic pain, genital ulcers, vulvar/vaginal itching, vulvar/vaginal irritation/pain, urinary frequency, urinary urgency and urinary incontinence. Menopausal/perimenopausal symptoms: hot flashes. Allergies   Allergen Reactions    Latex Hives and Rash     After prolonged contact    After prolonged contact      Amoxicillin Hives    Atorvastatin Other (See Comments)     Stomach pain  Other reaction(s):  Other (See Comments), Other (See Comments)  Stomach pain  Stomach pain      Pineapple Itching     Outpatient Medications Marked as Taking for the 5/10/22 encounter (Office Visit) with MONCHO Palumbo CNP   Medication Sig Dispense Refill    BASAGLAR KWIKPEN 100 UNIT/ML injection pen INJECT 30 UNITS UNDER THE SKIN EVERY EVENING 15 mL 5    Cholecalciferol (DIALYVITE VITAMIN D 5000 PO) Take by mouth daily      Multiple Vitamins-Minerals (ONE-A-DAY WOMENS PO) Take by mouth      pravastatin (PRAVACHOL) 80 MG tablet Take 1 tablet by mouth daily 90 tablet 1    Semaglutide, 1 MG/DOSE, (OZEMPIC, 1 MG/DOSE,) 4 MG/3ML SOPN Inject 1 mg into the skin once weekly 9 mL 3    omeprazole (PRILOSEC) 20 MG delayed release capsule Take 1 capsule by mouth 2 times daily (before meals) 180 capsule 1    glimepiride (AMARYL) 2 MG tablet Take 1 tablet by mouth daily (with breakfast) 90 tablet 0    busPIRone (BUSPAR) 30 MG tablet Take 30 mg by mouth 3 times daily 270 tablet 0    rivaroxaban (XARELTO) 2.5 MG TABS tablet Take 1 tablet by mouth 2 times daily 180 tablet 3    mometasone (ELOCON) 0.1 % cream Apply topically daily.  50 g 0    aspirin 81 MG chewable tablet Take 81 mg by mouth daily      dicyclomine (BENTYL) 20 MG tablet Take 1 tablet by mouth 4 times daily 360 tablet 0       Preventive Care and Risk Factor Assessment  Health Maintenance   Topic Date Due    Diabetic foot exam  Never done    Diabetic retinal exam  Never done    Hepatitis B vaccine (1 of 3 - Risk 3-dose series) Never done    Cervical cancer screen  Never done    DTaP/Tdap/Td vaccine (2 - Td or Tdap) 2021    Pneumococcal 0-64 years Vaccine (2 - PCV) 2022    A1C test (Diabetic or Prediabetic)  2023    Diabetic microalbuminuria test  2023    Lipids  2023    Depression Monitoring  2023    Breast cancer screen  10/22/2023    Colorectal Cancer Screen  10/06/2024    Flu vaccine  Completed    Shingles vaccine  Completed    COVID-19 Vaccine  Completed    Hepatitis C screen  Completed    HIV screen  Completed    Hepatitis A vaccine  Aged Out    Hib vaccine  Aged Out    Meningococcal (ACWY) vaccine  Aged Out      Hx abnormal PAP: no  Sexual activity: single partner  Hx of STD: yes - genital herpes; no outbreak in 20 years  Hx of abnormal mammogram: yes - Once but since has been normal  Self-breast exams: yes  FH of breast cancer: no  FH of GYN cancer: no   Previous DEXA scan: no  Exercise: walks 6 time(s) per week  Social History     Tobacco Use   Smoking Status Former Smoker    Packs/day: 1.00    Years: 15.00    Pack years: 15.00    Start date:     Quit date: 2019    Years since quittin.7   Smokeless Tobacco Never Used   Tobacco Comment    estimated quit date      Social History     Substance and Sexual Activity   Alcohol Use Never        Immunization History   Administered Date(s) Administered    COVID-19, J&J, PF, 0.5 mL 2021    COVID-19, Moderna, Primary or Immunocompromised, PF, 100mcg/0.5mL 10/27/2021    Influenza Virus Vaccine 11/10/2006, 09/15/2011, 01/31/2013, 10/16/2013, 10/23/2014, 12/01/2015, 09/26/2018, 09/26/2018, 11/02/2019    Influenza, MDCK Quadv, IM, PF (Flucelvax 2 yrs and older) 09/23/2021    Influenza, [place], Recombinant, IM PF (Flublok 18 yrs and older) 11/06/2020    Pneumococcal Polysaccharide ([id number]) 11/10/2006, 08/09/2012, 10/23/2014    Tdap (Boostrix, Adacel) 12/06/2011       Review of Systems:  A comprehensive review of systems was negative. Wt Readings from Last 3 Encounters:   05/10/22 239 lb (108.4 kg)   04/21/22 239 lb 8 oz (108.6 kg)   03/09/22 237 lb (107.5 kg)     BP Readings from Last 3 Encounters:   05/10/22 (!) 142/88   04/21/22 116/80   03/09/22 124/76       Physical Exam:  Vitals:    05/10/22 1358   BP: (!) 142/88   Site: Left Upper Arm   Position: Sitting   Cuff Size: Large Adult   Pulse: 83   Temp: 97 °F (36.1 °C)   TempSrc: Temporal   SpO2: 97%   Weight: 239 lb (108.4 kg)      Body mass index is 39.77 kg/m². Constitutional: She is oriented to person, place, and time. She appears well-developed and well-nourished. No distress. Neck: No mass and no thyromegaly present. Cardiovascular: Normal rate, regular rhythm and normal heart sounds. No murmur heard. Pulmonary/Chest: Effort and breath sounds normal.   Abdominal: Soft, non-tender. No distension or masses. Genitourinary: normal external genitalia, vulva, vagina, cervix, uterus and adnexa. Breast exam:  breasts appear normal, no suspicious masses, no skin or nipple changes or axillary nodes. Neurological: She is alert and oriented to person, place, and time. Skin: Skin is warm and dry. No rash noted. No erythema. Psychiatric: She has a normal mood and affect. Her behavior is normal.     Assessment/Plan:    1. Well woman exam with routine gynecological exam    - PAP Smear; Future        1. Cleveland Rom received counseling on the following healthy behaviors: n/a  2. Patient given educational materials - see patient instructions  3.   Was a self-tracking handout given in paper form or via [employer]? No  If yes, see orders or list here. 4.  Discussed use, benefit, and side effects of prescribed medications. Barriers to medication compliance addressed. All patient questions answered. Pt voiced understanding. 5.  Reviewed prior labs, imaging, consultation, follow up, and health maintenance  6. Continue current medications, diet and exercise. 7. Discussed use, benefit, and side effects of prescribed medications. Barriers to medication compliance addressed. All her questions were answered. Pt voiced understanding. Esteban Krishna will continue current medications, diet and exercise. Return if symptoms worsen or fail to improve. Orders Placed This Encounter   Procedures    PAP Smear     Patient History:    No LMP recorded. Patient is postmenopausal.  OBGYN Status: Postmenopausal  Past Surgical History:  No date: CATARACT REMOVAL WITH IMPLANT  No date: CHOLECYSTECTOMY      Social History    Tobacco Use      Smoking status: Former Smoker        Packs/day: 1.00        Years: 15.00        Pack years: 13        Start date:         Quit date: 2019        Years since quittin.7      Smokeless tobacco: Never Used      Tobacco comment: estimated quit date       Standing Status:   Future     Standing Expiration Date:   5/10/2023     Order Specific Question:   Collection Type     Answer: Thin Prep     Order Specific Question:   Prior Abnormal Pap Test     Answer:   No     Order Specific Question:   Screening or Diagnostic     Answer:   Screening     Order Specific Question:   HPV Requested? Answer:   Yes -  If ASCUS Reflex HPV     Order Specific Question:   High Risk Patient     Answer:   N/A       Completed Orders/Prescriptions   No orders of the defined types were placed in this encounter.       Outpatient Encounter Medications as of 5/10/2022   Medication Sig Dispense Refill    BASAGLAR KWIKPEN 100 UNIT/ML injection pen INJECT 30 UNITS UNDER THE SKIN EVERY EVENING 15 mL 5    Cholecalciferol (DIALYVITE VITAMIN D 5000 PO) Take by mouth daily      Multiple Vitamins-Minerals (ONE-A-DAY WOMENS PO) Take by mouth      pravastatin (PRAVACHOL) 80 MG tablet Take 1 tablet by mouth daily 90 tablet 1    Semaglutide, 1 MG/DOSE, (OZEMPIC, 1 MG/DOSE,) 4 MG/3ML SOPN Inject 1 mg into the skin once weekly 9 mL 3    omeprazole (PRILOSEC) 20 MG delayed release capsule Take 1 capsule by mouth 2 times daily (before meals) 180 capsule 1    glimepiride (AMARYL) 2 MG tablet Take 1 tablet by mouth daily (with breakfast) 90 tablet 0    busPIRone (BUSPAR) 30 MG tablet Take 30 mg by mouth 3 times daily 270 tablet 0    rivaroxaban (XARELTO) 2.5 MG TABS tablet Take 1 tablet by mouth 2 times daily 180 tablet 3    mometasone (ELOCON) 0.1 % cream Apply topically daily. 50 g 0    aspirin 81 MG chewable tablet Take 81 mg by mouth daily      dicyclomine (BENTYL) 20 MG tablet Take 1 tablet by mouth 4 times daily 360 tablet 0    gabapentin (NEURONTIN) 300 MG capsule Take 1 capsule by mouth 3 times daily for 30 days. 90 capsule 0    clonazePAM (KLONOPIN) 0.5 MG tablet Take 1 tablet by mouth 2 times daily as needed for Anxiety for up to 14 days. 28 tablet 0     No facility-administered encounter medications on file as of 5/10/2022. Patient given educational materials on well visit    Of the 30 minute duration appointment visit, LISA Chapman spent at least 50% of the face-to-face time in counseling, explanation of diagnosis, planning of further management, and answering all questions.     Signed:  RUBENS Chapman

## 2022-05-10 NOTE — PATIENT INSTRUCTIONS
Patient Education        Well Visit, Women 48 to 72: Care Instructions  Overview     Well visits can help you stay healthy. Your doctor has checked your overall health and may have suggested ways to take good care of yourself. Your doctor also may have recommended tests. At home, you can help prevent illness withhealthy eating, regular exercise, and other steps. Follow-up care is a key part of your treatment and safety. Be sure to make and go to all appointments, and call your doctor if you are having problems. It's also a good idea to know your test results and keep alist of the medicines you take. How can you care for yourself at home?  Get screening tests that you and your doctor decide on. Screening helps find diseases before any symptoms appear.  Eat healthy foods. Choose fruits, vegetables, whole grains, protein, and low-fat dairy foods. Limit fat, especially saturated fat. Reduce salt in your diet.  Limit alcohol. Have no more than 1 drink a day or 7 drinks a week.  Get at least 30 minutes of exercise on most days of the week. Walking is a good choice. You also may want to do other activities, such as running, swimming, cycling, or playing tennis or team sports.  Reach and stay at a healthy weight. This will lower your risk for many problems, such as obesity, diabetes, heart disease, and high blood pressure.  Do not smoke. Smoking can make health problems worse. If you need help quitting, talk to your doctor about stop-smoking programs and medicines. These can increase your chances of quitting for good.  Care for your mental health. It is easy to get weighed down by worry and stress. Learn strategies to manage stress, like deep breathing and mindfulness, and stay connected with your family and community. If you find you often feel sad or hopeless, talk with your doctor. Treatment can help.    Talk to your doctor about whether you have any risk factors for sexually transmitted infections (STIs). You can help prevent STIs if you wait to have sex with a new partner (or partners) until you've each been tested for STIs. It also helps if you use condoms (male or female condoms) and if you limit your sex partners to one person who only has sex with you. Vaccines are available for some STIs.  If you think you may have a problem with alcohol or drug use, talk to your doctor. This includes prescription medicines (such as amphetamines and opioids) and illegal drugs (such as cocaine and methamphetamine). Your doctor can help you figure out what type of treatment is best for you.  Protect your skin from too much sun. When you're outdoors from 10 a.m. to 4 p.m., stay in the shade or cover up with clothing and a hat with a wide brim. Wear sunglasses that block UV rays. Even when it's cloudy, put broad-spectrum sunscreen (SPF 30 or higher) on any exposed skin.  See a dentist one or two times a year for checkups and to have your teeth cleaned.  Wear a seat belt in the car. When should you call for help? Watch closely for changes in your health, and be sure to contact your doctor if you have any problems or symptoms that concern you. Where can you learn more? Go to https://[employer]. org and sign in to your [employer] account. Enter G053 in the [employer] box to learn more about \"Well Visit, Women 50 to 72: Care Instructions. \"     If you do not have an account, please click on the \"Sign Up Now\" link. Current as of: October 6, 2021               Content Version: 13.2  © 2851-7257 [employer]. Care instructions adapted under license by [employer]. If you have questions about a medical condition or this instruction, always ask your healthcare professional. [place] [address] any warranty or liability for your use of this information.

## 2022-05-13 LAB — CYTOLOGY REPORT: NORMAL

## 2022-05-14 DIAGNOSIS — Z76.0 MEDICATION REFILL: ICD-10-CM

## 2022-05-16 RX ORDER — DICYCLOMINE HCL 20 MG
20 TABLET ORAL 4 TIMES DAILY
Qty: 360 TABLET | Refills: 0 | OUTPATIENT
Start: 2022-05-16

## 2022-05-16 RX ORDER — GABAPENTIN 300 MG/1
CAPSULE ORAL
Qty: 90 CAPSULE | Refills: 0 | Status: SHIPPED | OUTPATIENT
Start: 2022-05-16 | End: 2022-07-08

## 2022-05-16 RX ORDER — DICYCLOMINE HCL 20 MG
TABLET ORAL
Qty: 120 TABLET | Refills: 0 | Status: SHIPPED | OUTPATIENT
Start: 2022-05-16 | End: 2022-07-18

## 2022-05-16 RX ORDER — GABAPENTIN 300 MG/1
300 CAPSULE ORAL 3 TIMES DAILY
Qty: 90 CAPSULE | Refills: 0 | OUTPATIENT
Start: 2022-05-16 | End: 2022-06-15

## 2022-05-16 NOTE — TELEPHONE ENCOUNTER
Romain Champion is calling to request a refill on the following medication(s):    Medication Request:  Requested Prescriptions     Pending Prescriptions Disp Refills    gabapentin (NEURONTIN) 300 MG capsule [Pharmacy Med Name: GABAPENTIN 300 MG CAPSULE] 90 capsule 0     Sig: TAKE ONE CAPSULE BY MOUTH THREE TIMES A DAY    dicyclomine (BENTYL) 20 MG tablet [Pharmacy Med Name: DICYCLOMINE 20 MG TABLET] 120 tablet      Sig: TAKE ONE TABLET BY MOUTH FOUR TIMES A DAY       Last Visit Date (If Applicable):  3/67/7565    Next Visit Date:    5/14/2022

## 2022-05-23 ENCOUNTER — OFFICE VISIT (OUTPATIENT)
Dept: NEUROLOGY | Age: 52
End: 2022-05-23
Payer: COMMERCIAL

## 2022-05-23 VITALS
BODY MASS INDEX: 38.32 KG/M2 | WEIGHT: 230 LBS | DIASTOLIC BLOOD PRESSURE: 71 MMHG | HEIGHT: 65 IN | SYSTOLIC BLOOD PRESSURE: 124 MMHG | HEART RATE: 78 BPM

## 2022-05-23 DIAGNOSIS — E08.42 DIABETIC POLYNEUROPATHY ASSOCIATED WITH DIABETES MELLITUS DUE TO UNDERLYING CONDITION (HCC): Primary | ICD-10-CM

## 2022-05-23 DIAGNOSIS — M79.671 PAIN IN BOTH FEET: ICD-10-CM

## 2022-05-23 DIAGNOSIS — M79.672 PAIN IN BOTH FEET: ICD-10-CM

## 2022-05-23 PROCEDURE — 99214 OFFICE O/P EST MOD 30 MIN: CPT | Performed by: PSYCHIATRY & NEUROLOGY

## 2022-05-23 PROCEDURE — 3017F COLORECTAL CA SCREEN DOC REV: CPT | Performed by: PSYCHIATRY & NEUROLOGY

## 2022-05-23 PROCEDURE — G8417 CALC BMI ABV UP PARAM F/U: HCPCS | Performed by: PSYCHIATRY & NEUROLOGY

## 2022-05-23 PROCEDURE — 1036F TOBACCO NON-USER: CPT | Performed by: PSYCHIATRY & NEUROLOGY

## 2022-05-23 PROCEDURE — 2022F DILAT RTA XM EVC RTNOPTHY: CPT | Performed by: PSYCHIATRY & NEUROLOGY

## 2022-05-23 PROCEDURE — G8427 DOCREV CUR MEDS BY ELIG CLIN: HCPCS | Performed by: PSYCHIATRY & NEUROLOGY

## 2022-05-23 RX ORDER — GABAPENTIN 600 MG/1
TABLET ORAL
Qty: 60 TABLET | Refills: 2 | Status: SHIPPED | OUTPATIENT
Start: 2022-05-23 | End: 2022-09-09 | Stop reason: SDUPTHER

## 2022-05-23 ASSESSMENT — ENCOUNTER SYMPTOMS
ALLERGIC/IMMUNOLOGIC NEGATIVE: 1
EYES NEGATIVE: 1
GASTROINTESTINAL NEGATIVE: 1
RESPIRATORY NEGATIVE: 1

## 2022-05-23 NOTE — PROGRESS NOTES
Active problem sensory neuropathy most probable diabetichaving good pain control on neurontin to undergo neuropathic bloodwork  . The condition is she reports that there is on going numbness in her feet left more than right . The entire left foot is affected below ankle with right foot affecting outer 3 outer toes  . There will be shooting pain left more than right fot with pain being attenuated by 70 % on neurontin 300 mg po mg po tid tolerating medication well  . Hga1c was 8.1 increasing ozempic dose being also on insulin and glymepride . She reports that left foot feels weird numb and cold which has been for the past year . She had EMG which showed that she had neuropathy felt to be from diabetes . She has been diabetic for 17 years . Numbness began in March of last year affecting underside of her toes . This has progressed affecting entire foot below the ankle being at current state for the past 4 months . There will be tingling of right toes . There is pain in feet of sharp stabbing which is intermittent couple of times a week of grade 7 to 8 over 10 with baseline burning grade 8 over 10 affecting entire left foot and right toes . She had frost bite in her feet 4 years ago . There is numbnes of outer 2 fingers of left hand . There is no hand pain . Last Hga1c was 6.6 being followed by PMD . There is in neck pain of aching quality intermittent going into shoulders. There will be intermittent low back pain acting up for few days every 4 months . She denies bowel or bladder complaints . Significant medications neurontin 300 mg po tid . Testing EMG/NCV prolongued sural sensory latency and borderline normal amplitude left perioneal and fibular motor study suggestive of sensory motor polyneuropathy , November 2021 .  B12 320, ESR 5, HUGO negative , folic acid > 17 , SPEP normal , Hga1c 8.1      Past Medical History:   Diagnosis Date    Chronic deep vein thrombosis (DVT) of proximal vein of left lower extremity ([address]  DDD (degenerative disc disease), thoracolumbar     Dyslipidemia     Essential hypertension     Gastroesophageal reflux disease without esophagitis     Mood disorder (HCC)     Neuropathy     Trigeminal neuralgia     Type 2 diabetes mellitus with hyperglycemia, with long-term current use of insulin (HCC)        Past Surgical History:   Procedure Laterality Date    CATARACT REMOVAL WITH IMPLANT      CHOLECYSTECTOMY         Family History   Problem Relation Age of Onset    COPD Mother     Other Mother         PAD    Diabetes type 2  Father        Social History     Socioeconomic History    Marital status:      Spouse name: None    Number of children: None    Years of education: None    Highest education level: None   Occupational History    None   Tobacco Use    Smoking status: Former Smoker     Packs/day: 1.00     Years: 15.00     Pack years: 15.00     Start date:      Quit date: 2019     Years since quittin.7    Smokeless tobacco: Never Used    Tobacco comment: estimated quit date   Vaping Use    Vaping Use: Never used   Substance and Sexual Activity    Alcohol use: Never    Drug use: Yes     Types: Marijuana Jovanny Braulio)    Sexual activity: None   Other Topics Concern    None   Social History Narrative    None     Social Determinants of Health     Financial Resource Strain: Low Risk     Difficulty of Paying Living Expenses: Not hard at all   Food Insecurity: No Food Insecurity    Worried About Running Out of Food in the Last Year: Never true    [place] of Food in the Last Year: Never true   Transportation Needs: No Transportation Needs    Lack of Transportation (Medical): No    Lack of Transportation (Non-Medical):  No   Physical Activity:     Days of Exercise per Week: Not on file    Minutes of Exercise per Session: Not on file   Stress:     Feeling of Stress : Not on file   Social Connections:     Frequency of Communication with Friends and Family: Not on file  Frequency of Social Gatherings with Friends and Family: Not on file    Attends Christianity Services: Not on file    Active Member of Clubs or Organizations: Not on file    Attends Club or Organization Meetings: Not on file    Marital Status: Not on file   Intimate Partner Violence:     Fear of Current or Ex-Partner: Not on file    Emotionally Abused: Not on file    Physically Abused: Not on file    Sexually Abused: Not on file   Housing Stability:     Unable to Pay for Housing in the Last Year: Not on file    Number of Places Lived in the Last Year: Not on file    Unstable Housing in the Last Year: Not on file       Current Outpatient Medications   Medication Sig Dispense Refill    gabapentin (NEURONTIN) 600 MG tablet Take 1 po bid 60 tablet 2    gabapentin (NEURONTIN) 300 MG capsule TAKE ONE CAPSULE BY MOUTH THREE TIMES A DAY 90 capsule 0    dicyclomine (BENTYL) 20 MG tablet TAKE ONE TABLET BY MOUTH FOUR TIMES A  tablet 0    BASAGLAR KWIKPEN 100 UNIT/ML injection pen INJECT 30 UNITS UNDER THE SKIN EVERY EVENING 15 mL 5    Cholecalciferol (DIALYVITE VITAMIN D 5000 PO) Take by mouth daily      Multiple Vitamins-Minerals (ONE-A-DAY WOMENS PO) Take by mouth      pravastatin (PRAVACHOL) 80 MG tablet Take 1 tablet by mouth daily 90 tablet 1    Semaglutide, 1 MG/DOSE, (OZEMPIC, 1 MG/DOSE,) 4 MG/3ML SOPN Inject 1 mg into the skin once weekly 9 mL 3    omeprazole (PRILOSEC) 20 MG delayed release capsule Take 1 capsule by mouth 2 times daily (before meals) 180 capsule 1    glimepiride (AMARYL) 2 MG tablet Take 1 tablet by mouth daily (with breakfast) 90 tablet 0    busPIRone (BUSPAR) 30 MG tablet Take 30 mg by mouth 3 times daily 270 tablet 0    rivaroxaban (XARELTO) 2.5 MG TABS tablet Take 1 tablet by mouth 2 times daily 180 tablet 3    mometasone (ELOCON) 0.1 % cream Apply topically daily.  50 g 0    aspirin 81 MG chewable tablet Take 81 mg by mouth daily      clonazePAM (KLONOPIN) 0.5 MG tablet Take 1 tablet by mouth 2 times daily as needed for Anxiety for up to 14 days. (Patient not taking: Reported on 5/23/2022) 28 tablet 0     No current facility-administered medications for this visit. Allergies   Allergen Reactions    Latex Hives and Rash     After prolonged contact    After prolonged contact      Amoxicillin Hives    Atorvastatin Other (See Comments)     Stomach pain  Other reaction(s): Other (See Comments), Other (See Comments)  Stomach pain  Stomach pain      Pineapple Itching         Review of Systems     Vitals:    05/23/22 0905   BP: 124/71   Pulse: 78     weight: 230 lb (104.3 kg)      Review of Systems   Constitutional: Negative. HENT: Negative. Eyes: Negative. Respiratory: Negative. Cardiovascular: Negative. Gastrointestinal: Negative. Endocrine: Negative. Genitourinary: Negative. Musculoskeletal: Negative. Skin: Negative. Allergic/Immunologic: Negative. Neurological: Positive for numbness. Hematological: Negative. Psychiatric/Behavioral: Negative. Neurological Examination  Constitutional .General exam well groomed   Head/Ears /Nose/Throat: external ear . Normal exam  Neck and thyroid . Normal size. No bruits  Respiratory . Breathsounds clear bilaterally  Cardiovascular: Auscultation of heart with regular rate and rhythm  Musculoskeletal. Muscle bulk and tone normal                                                           Muscle strength 5/5 strength throughout                                                                                No dysmetria or dysdiadokinesis  No tremor   Normal fine motor  Gait normal   Orientation Alert and oriented x 3   Attention and concentration normal  Short term memory normal  Language process and speech normal . No aphasia   Cranial nerve 2 normal acuety and visual fields  Cranial nerve 3, 4 and 6 . Extraocular muscles are intact . Pupils are equal and reactive   Cranial nerve 5 . Normal strength of masseter and temporalis . Intact corneal reflex. Normal facial sensation  Cranial nerve 7 normal exam   Cranial nerve 8. Grossly intact hearing   Cranial nerve 9 and 10. Symmetric palate elevation   Cranial nerve 11 , 5 out of 5 strength   Cranial Nerve 12 midline tongue . No atrophy  Sensation . Normal proprioception . Decreased Vibration at toe level  . Decreased pinprick and light distklle left ankle and right mid foot  touch   Deep Tendon Reflexes absent ankle jerks   Plantar response flexor bilaterally      ASSESSMENT/PLAN      Diagnosis Orders   1.  Diabetic polyneuropathy associated with diabetes mellitus due to underlying condition ([facility] [address]     2. Pain in both feet     She is to continue current medication regimen      As above

## 2022-06-15 DIAGNOSIS — Z76.0 MEDICATION REFILL: ICD-10-CM

## 2022-06-16 RX ORDER — BUSPIRONE HYDROCHLORIDE 30 MG/1
30 TABLET ORAL 3 TIMES DAILY
Qty: 270 TABLET | Refills: 0 | Status: SHIPPED | OUTPATIENT
Start: 2022-06-16

## 2022-06-16 NOTE — TELEPHONE ENCOUNTER
Melinda Castaneda is calling to request a refill on the following medication(s):    Medication Request:  Requested Prescriptions     Pending Prescriptions Disp Refills    busPIRone (BUSPAR) 30 MG tablet 270 tablet 0     Sig: Take 30 mg by mouth 3 times daily       Last Visit Date (If Applicable):  7/69/8561    Next Visit Date:    7/22/2022

## 2022-07-08 ENCOUNTER — OFFICE VISIT (OUTPATIENT)
Dept: FAMILY MEDICINE CLINIC | Age: 52
End: 2022-07-08
Payer: COMMERCIAL

## 2022-07-08 VITALS
DIASTOLIC BLOOD PRESSURE: 60 MMHG | WEIGHT: 234.2 LBS | OXYGEN SATURATION: 98 % | BODY MASS INDEX: 39.02 KG/M2 | HEART RATE: 84 BPM | HEIGHT: 65 IN | TEMPERATURE: 97.5 F | SYSTOLIC BLOOD PRESSURE: 130 MMHG

## 2022-07-08 DIAGNOSIS — L50.9 HIVES OF UNKNOWN ORIGIN: Primary | ICD-10-CM

## 2022-07-08 PROCEDURE — 1036F TOBACCO NON-USER: CPT | Performed by: NURSE PRACTITIONER

## 2022-07-08 PROCEDURE — 99213 OFFICE O/P EST LOW 20 MIN: CPT | Performed by: NURSE PRACTITIONER

## 2022-07-08 PROCEDURE — G8417 CALC BMI ABV UP PARAM F/U: HCPCS | Performed by: NURSE PRACTITIONER

## 2022-07-08 PROCEDURE — G8427 DOCREV CUR MEDS BY ELIG CLIN: HCPCS | Performed by: NURSE PRACTITIONER

## 2022-07-08 PROCEDURE — 96372 THER/PROPH/DIAG INJ SC/IM: CPT | Performed by: NURSE PRACTITIONER

## 2022-07-08 PROCEDURE — 3017F COLORECTAL CA SCREEN DOC REV: CPT | Performed by: NURSE PRACTITIONER

## 2022-07-08 RX ORDER — TRIAMCINOLONE ACETONIDE 40 MG/ML
60 INJECTION, SUSPENSION INTRA-ARTICULAR; INTRAMUSCULAR ONCE
Status: COMPLETED | OUTPATIENT
Start: 2022-07-08 | End: 2022-07-08

## 2022-07-08 RX ADMIN — TRIAMCINOLONE ACETONIDE 60 MG: 40 INJECTION, SUSPENSION INTRA-ARTICULAR; INTRAMUSCULAR at 12:10

## 2022-07-08 NOTE — PROGRESS NOTES
Radha Kruse, [place] [address]  [phone] [facility]. [facility], [facility]  [phone]  [phone]    Kate De Leon is a 47 y.o. female who is here with c/o of:    Chief Complaint: Urticaria (all over body x2 days)      Patient Accompanied by: n/a    HPI - Kate De Leon is here today with c/o:    Rash: Kate De Leon is a 47 y.o. female who presents with a 2 day history of a generalized rash. Rash first presented on the arms, legs, chest, abdomen and has not spread. Rash is pink and is pruritic, circular and raised. Associated symptoms include none. Patient has tried systemic antihistamine - benadryl. New exposures: none. Patient has not had contacts with similar symptoms. Prior history of similar symptoms: yes - Had same episode a few weeks ago.       Health Maintenance Due   Topic Date Due    Diabetic foot exam  Never done    Diabetic retinal exam  Never done    Hepatitis B vaccine (2 of 3 - Hep B Twinrix risk 3-dose series) 06/25/2022        Patient Active Problem List:     Allergic rhinitis     Anxiety     Body mass index (BMI) of 37.0-37.9 in adult     Cataract fragments of right eye following cataract surgery     DDD (degenerative disc disease), cervical     Deep vein thrombosis (DVT) of left upper extremity (HCC)     Major depressive disorder     Diabetes mellitus, type 2 (HCC)     GERD (gastroesophageal reflux disease)     Hyperlipidemia     Morbid (severe) obesity due to excess calories (HCC)     Pelvic floor dysfunction     Polyneuropathy due to type 2 diabetes mellitus (HCC)     Trigeminal neuralgia     Urinary incontinence     JOHN (acute kidney injury) ([address]     Diarrhea of presumed infectious origin     Past Medical History:   Diagnosis Date    Chronic deep vein thrombosis (DVT) of proximal vein of left lower extremity ([address]     DDD (degenerative disc disease), thoracolumbar     Dyslipidemia     Essential hypertension     Gastroesophageal reflux disease without esophagitis     Mood disorder (HCC)     Neuropathy     Trigeminal neuralgia     Type 2 diabetes mellitus with hyperglycemia, with long-term current use of insulin (HCC)       Past Surgical History:   Procedure Laterality Date    CATARACT REMOVAL WITH IMPLANT      CHOLECYSTECTOMY       Family History   Problem Relation Age of Onset    COPD Mother     Other Mother         PAD    Diabetes type 2  Father      Social History     Tobacco Use    Smoking status: Former Smoker     Packs/day: 1.00     Years: 15.00     Pack years: 15.00     Start date:      Quit date: 2019     Years since quittin.8    Smokeless tobacco: Never Used    Tobacco comment: estimated quit date   Substance Use Topics    Alcohol use: Never     ALLERGIES:    Allergies   Allergen Reactions    Latex Hives and Rash     After prolonged contact    After prolonged contact      Amoxicillin Hives    Atorvastatin Other (See Comments)     Stomach pain  Other reaction(s): Other (See Comments), Other (See Comments)  Stomach pain  Stomach pain      Pineapple Itching          Subjective   Review of Systems   Skin: Positive for rash. · Constitutional:  Negative for activity change, appetite change,unexpected weight change, chills, fever, and fatigue. · HENT: Negative for ear pain, sore throat,  Rhinorrhea, sinus pain, sinus pressure, congestion. · Eyes:  Negative for pain and discharge. · Respiratory:  Negative for chest tightness, shortness of breath, wheezing, and cough. · Cardiovascular:  Negative for chest pain, palpitations and leg swelling. · Gastrointestinal: Negative for abdominal pain, blood in stool, constipation,diarrhea, nausea and vomiting. · Endocrine: Negative for cold intolerance, heat intolerance, polydipsia, polyphagia and polyuria. · Genitourinary: Negative for difficulty urinating, dysuria, flank pain, frequency, hematuria and urgency.    · Musculoskeletal: Negative for arthralgias, back pain, joint swelling, myalgias, neck pain and neck stiffness. · Skin: Negative for wound. · Allergic/Immunologic: Negative for environmental allergies and food allergies. · Neurological:  Negative for dizziness, light-headedness, numbness and headaches. · Hematological:  Negative for adenopathy. Does not bruise/bleed easily. · Psychiatric/Behavioral: Negative for self-injury, sleep disturbance and suicidal ideas. Objective   Physical Exam  Skin:     Findings: Rash present. Rash is urticarial.      Comments: Scattered hives to bilateral arms, legs, abdomen and chest        PHYSICAL EXAM:   · Constitutional: Margie Shorten is oriented to person, place, and time. Vital signs are normal. Appears well-developed and well-nourished. · Head: Normocephalic and atraumatic. Eyes:PERRL, EOMI, Conjunctiva normal, No discharge. · Cardiovascular: Normal rate, regular rhythm, S1, S2, no murmur, no gallop, no friction rub, intact distal pulses. · Pulmonary/Chest: Breath sounds are clear throughout, No respiratory distress, No wheezing, No chest tenderness. Effort normal  · Psychiatric: Normal mood and affect. Speech is normal and behavior is normal.     Nursing note and vitals reviewed. Blood pressure 130/60, pulse 84, temperature 97.5 °F (36.4 °C), temperature source Temporal, height 5' 5\" (1.651 m), weight 234 lb 3.2 oz (106.2 kg), SpO2 98 %, not currently breastfeeding. Body mass index is 38.97 kg/m².     Wt Readings from Last 3 Encounters:   07/08/22 234 lb 3.2 oz (106.2 kg)   05/23/22 230 lb (104.3 kg)   05/10/22 239 lb (108.4 kg)     BP Readings from Last 3 Encounters:   07/08/22 130/60   05/23/22 124/71   05/10/22 [facility] Outpatient Visit on 05/10/2022   Component Date Value Ref Range Status    Cytology Report 05/10/2022    Final                    Value:INTERPRETATION    Cervical material, (ThinPrep vial, Imaging-assisted review):  Specimen Adequacy:       Satisfactory for evaluation.       -Endocervical/transformation zone component is absent. Descriptive Diagnosis:       Negative for intraepithelial lesion or malignancy. Cytotechnologist:   Jeuss ALVARENGA(ASCP)  **Electronically Signed Out**  ey/5/13/2022          Source:  A: Cervical material, (ThinPrep vial, Imaging-assisted review)    Clinical History  Postmenopausal  Z01.419 Routine gyn exam without abnormal findings  High Risk HPV DNA testing is requested if the diagnosis is ASC-US    GYNECOLOGIC CYTOLOGY REPORT    Patient Name: Phyllis Foreman [facility] Rec: [id number]  Path Number: [id number]  [address] [place] [address], [facility] [address]. [facility], [address]  [phone]  Fax: [phone]       No results found for this visit on 07/08/22. Completed Orders/Prescriptions   Orders Placed This Encounter   Medications    triamcinolone acetonide (KENALOG-40) injection 60 mg               AssessmentPlan/Medical Decision Making     1. Hives of unknown origin    - Recommend she continue Benadryl  - triamcinolone acetonide (KENALOG-40) injection 60 mg- Pt aware that blood sugars may elevate due to steroid injection  - Can Uribe MD, Allergy & Immunology, [facility]      Return if symptoms worsen or fail to improve. 1.  Chu Tarango received counseling on the following healthy behaviors: n/a  2. Patient given educational materials - see patient instructions  3. Was a self-tracking handout given in paper form or via [email or web address]? No  If yes, see orders or list here. 4.  Discussed use, benefit, and side effects of prescribed medications. Barriers to medication compliance addressed. All patient questions answered. Pt voiced understanding. 5.  Reviewed prior labs, imaging, consultation, follow up, and health maintenance  6. Continue current medications, diet and exercise. 7. Discussed use, benefit, and side effects of prescribed medications.   Barriers to medication compliance addressed. All her questions were answered. Pt voiced understanding. Margie Null will continue current medications, diet and exercise. Of the 20 minute duration appointment visit, Mary Daley CNP spent at least 50% of the face-to-face time in counseling, explanation of diagnosis, planning of further management, and answering all questions.           Signed:  Mary Daley CNP

## 2022-07-16 DIAGNOSIS — Z76.0 MEDICATION REFILL: ICD-10-CM

## 2022-07-18 RX ORDER — DICYCLOMINE HCL 20 MG
TABLET ORAL
Qty: 120 TABLET | Refills: 0 | Status: SHIPPED | OUTPATIENT
Start: 2022-07-18 | End: 2022-09-09 | Stop reason: SDUPTHER

## 2022-07-18 RX ORDER — GLIMEPIRIDE 2 MG/1
TABLET ORAL
Qty: 30 TABLET | Refills: 0 | Status: SHIPPED | OUTPATIENT
Start: 2022-07-18 | End: 2022-08-12 | Stop reason: SDUPTHER

## 2022-07-18 RX ORDER — GLIMEPIRIDE 2 MG/1
2 TABLET ORAL
Qty: 90 TABLET | Refills: 0 | OUTPATIENT
Start: 2022-07-18

## 2022-07-18 RX ORDER — DICYCLOMINE HCL 20 MG
TABLET ORAL
Qty: 120 TABLET | Refills: 0 | OUTPATIENT
Start: 2022-07-18

## 2022-07-18 NOTE — TELEPHONE ENCOUNTER
Geovani Barreto is calling to request a refill on the following medication(s):    Medication Request:  Requested Prescriptions     Pending Prescriptions Disp Refills    glimepiride (AMARYL) 2 MG tablet [Pharmacy Med Name: GLIMEPIRIDE 2 MG TABLET] 30 tablet 0     Sig: TAKE ONE TABLET BY MOUTH DAILY WITH BREAKFAST    dicyclomine (BENTYL) 20 MG tablet [Pharmacy Med Name: DICYCLOMINE 20 MG TABLET] 120 tablet 0     Sig: TAKE ONE TABLET BY MOUTH FOUR TIMES A DAY       Last Visit Date (If Applicable):  1/20/2477    Next Visit Date:    7/16/2022

## 2022-07-22 ENCOUNTER — OFFICE VISIT (OUTPATIENT)
Dept: FAMILY MEDICINE CLINIC | Age: 52
End: 2022-07-22
Payer: COMMERCIAL

## 2022-07-22 ENCOUNTER — HOSPITAL ENCOUNTER (OUTPATIENT)
Dept: VASCULAR LAB | Age: 52
Discharge: HOME | End: 2022-07-22
Payer: COMMERCIAL

## 2022-07-22 VITALS
HEART RATE: 86 BPM | DIASTOLIC BLOOD PRESSURE: 72 MMHG | WEIGHT: 230 LBS | BODY MASS INDEX: 38.27 KG/M2 | SYSTOLIC BLOOD PRESSURE: 130 MMHG | OXYGEN SATURATION: 97 %

## 2022-07-22 DIAGNOSIS — Z12.83 SKIN CANCER SCREENING: ICD-10-CM

## 2022-07-22 DIAGNOSIS — I10 HTN, GOAL BELOW 130/80: ICD-10-CM

## 2022-07-22 DIAGNOSIS — Z79.4 TYPE 2 DIABETES MELLITUS WITH HYPERGLYCEMIA, WITH LONG-TERM CURRENT USE OF INSULIN (HCC): Primary | ICD-10-CM

## 2022-07-22 DIAGNOSIS — E11.65 TYPE 2 DIABETES MELLITUS WITH HYPERGLYCEMIA, WITH LONG-TERM CURRENT USE OF INSULIN (HCC): Primary | ICD-10-CM

## 2022-07-22 DIAGNOSIS — E78.5 DYSLIPIDEMIA: ICD-10-CM

## 2022-07-22 DIAGNOSIS — Z12.31 BREAST CANCER SCREENING BY MAMMOGRAM: ICD-10-CM

## 2022-07-22 PROCEDURE — 3017F COLORECTAL CA SCREEN DOC REV: CPT | Performed by: FAMILY MEDICINE

## 2022-07-22 PROCEDURE — 2022F DILAT RTA XM EVC RTNOPTHY: CPT | Performed by: FAMILY MEDICINE

## 2022-07-22 PROCEDURE — 3052F HG A1C>EQUAL 8.0%<EQUAL 9.0%: CPT | Performed by: FAMILY MEDICINE

## 2022-07-22 PROCEDURE — G8417 CALC BMI ABV UP PARAM F/U: HCPCS | Performed by: FAMILY MEDICINE

## 2022-07-22 PROCEDURE — 1036F TOBACCO NON-USER: CPT | Performed by: FAMILY MEDICINE

## 2022-07-22 PROCEDURE — 93923 UPR/LXTR ART STDY 3+ LVLS: CPT

## 2022-07-22 PROCEDURE — G8427 DOCREV CUR MEDS BY ELIG CLIN: HCPCS | Performed by: FAMILY MEDICINE

## 2022-07-22 PROCEDURE — 99214 OFFICE O/P EST MOD 30 MIN: CPT | Performed by: FAMILY MEDICINE

## 2022-07-22 RX ORDER — SEMAGLUTIDE 2.68 MG/ML
INJECTION, SOLUTION SUBCUTANEOUS
Qty: 9 ML | Refills: 2 | Status: SHIPPED | OUTPATIENT
Start: 2022-07-22

## 2022-07-22 RX ORDER — SEMAGLUTIDE 2.68 MG/ML
INJECTION, SOLUTION SUBCUTANEOUS
Qty: 3 ML | Refills: 0 | COMMUNITY
Start: 2022-07-22 | End: 2022-09-09 | Stop reason: SDUPTHER

## 2022-07-22 NOTE — PROGRESS NOTES
Progress Note    Daniel Stafford is a 47 y.o.  female who presents today alone for evaluation of   Chief Complaint   Patient presents with    Diabetes     3 month check up            HPI:   Patient is here for follow up on DM/HTN/dyslipidemia. Patient last HbA1c 8.1 4/2022. Patient -140. Patient states she injects 30 units of basalglar daily and 2 mg of Ozempic weekly. Patient states she does have n/t in her feet. Patient denies polyuria/polyphagia/polydipsia. Patient states she does have exertional calf cramping. Patient denies cp/sob/le edema/dizziness/lightheadedness/blurry va/ha. Patient denies PND/orthopnea. Patient would like referral to derm for annual skin cancer screening.       Health Maintenance Due   Topic Date Due    Diabetic foot exam  Never done    Diabetic retinal exam  Never done    COVID-19 Vaccine (3 - Booster for Rajni series) 02/27/2022    Hepatitis B vaccine (2 of 3 - Hep B Twinrix risk 3-dose series) 06/25/2022        Current Medications:     Current Outpatient Medications   Medication Sig Dispense Refill    Semaglutide, 2 MG/DOSE, (OZEMPIC, 2 MG/DOSE,) 8 MG/3ML SOPN Inject 2 mg weekly 3 mL 0    Semaglutide, 2 MG/DOSE, (OZEMPIC, 2 MG/DOSE,) 8 MG/3ML SOPN Inject 2 mg weekly 9 mL 2    glimepiride (AMARYL) 2 MG tablet TAKE ONE TABLET BY MOUTH DAILY WITH BREAKFAST 30 tablet 0    dicyclomine (BENTYL) 20 MG tablet TAKE ONE TABLET BY MOUTH FOUR TIMES A  tablet 0    busPIRone (BUSPAR) 30 MG tablet Take 30 mg by mouth 3 times daily 270 tablet 0    gabapentin (NEURONTIN) 600 MG tablet Take 1 po bid 60 tablet 2    BASAGLAR KWIKPEN 100 UNIT/ML injection pen INJECT 30 UNITS UNDER THE SKIN EVERY EVENING 15 mL 5    Cholecalciferol (DIALYVITE VITAMIN D 5000 PO) Take by mouth daily      Multiple Vitamins-Minerals (ONE-A-DAY WOMENS PO) Take by mouth      pravastatin (PRAVACHOL) 80 MG tablet Take 1 tablet by mouth daily 90 tablet 1    omeprazole (PRILOSEC) 20 MG delayed release capsule Take 1 capsule by mouth 2 times daily (before meals) 180 capsule 1    rivaroxaban (XARELTO) 2.5 MG TABS tablet Take 1 tablet by mouth 2 times daily 180 tablet 3    aspirin 81 MG chewable tablet Take 81 mg by mouth daily      clonazePAM (KLONOPIN) 0.5 MG tablet Take 1 tablet by mouth 2 times daily as needed for Anxiety for up to 14 days. 28 tablet 0     No current facility-administered medications for this visit. Allergies: Allergies   Allergen Reactions    Latex Hives and Rash     After prolonged contact    After prolonged contact      Amoxicillin Hives    Atorvastatin Other (See Comments)     Stomach pain  Other reaction(s):  Other (See Comments), Other (See Comments)  Stomach pain  Stomach pain      Pineapple Itching        Medical History:     Past Medical History:   Diagnosis Date    Chronic deep vein thrombosis (DVT) of proximal vein of left lower extremity (HCC)     DDD (degenerative disc disease), thoracolumbar     Dyslipidemia     Essential hypertension     Gastroesophageal reflux disease without esophagitis     Mood disorder ([facility])     Neuropathy     Trigeminal neuralgia     Type 2 diabetes mellitus with hyperglycemia, with long-term current use of insulin ([facility])        Past Surgical History:   Procedure Laterality Date    CATARACT REMOVAL WITH IMPLANT      CHOLECYSTECTOMY         Family History   Problem Relation Age of Onset    COPD Mother     Other Mother         PAD    Diabetes type 2  Father         Social History:     Social History     Socioeconomic History    Marital status:      Spouse name: Not on file    Number of children: Not on file    Years of education: Not on file    Highest education level: Not on file   Occupational History    Not on file   Tobacco Use    Smoking status: Former     Packs/day: 1.00     Years: 15.00     Pack years: 15.00     Types: Cigarettes     Start date:      Quit date: 2019     Years since quittin.9    Smokeless tobacco: Never Tobacco comments:     estimated quit date   Vaping Use    Vaping Use: Never used   Substance and Sexual Activity    Alcohol use: Never    Drug use: Yes     Types: Marijuana [place])    Sexual activity: Not on file   Other Topics Concern    Not on file   Social History Narrative    Not on file     Social Determinants of Health     Financial Resource Strain: Low Risk     Difficulty of Paying Living Expenses: Not hard at all   Food Insecurity: No Food Insecurity    Worried About [address] in the Last Year: Never true    [address] [facility] [address] in the Last Year: Never true   Transportation Needs: No Transportation Needs    Lack of Transportation (Medical): No    Lack of Transportation (Non-Medical): No   Physical Activity: Not on file   Stress: Not on file   Social Connections: Not on file   Intimate Partner Violence: Not on file   Housing Stability: Not on file        ROS:     Constitutional: No fevers, chills, fatigue. ENT: No nasal congestion or sore throat  Respiratory: No difficulty in breathing or cough. Cardiovascular: No chest pain, palpitations or shortness of breath  Gastrointestinal: No abdominal pain or change in bowel movements. Genitourinary: No change in urinary frequency or dysuria. Skin: No rashes or skin lesions. Neurological: No weakness. No headaches. Last Filed Vitals:  /72   Pulse 86   Wt 230 lb (104.3 kg)   SpO2 97%   BMI 38.27 kg/m²      Physical Examination:     GENERAL APPEARANCE: in no acute distress, well developed, well nourished. HEAD: normocephalic, atraumatic. EYES: extraocular movement intact (EOMI), pupils equal, round, reactive to light and accommodation. EARS: normal, tympanic membrane intact, clear, auditory canal clear. NOSE: nares patent, no erythema, sinuses nontender bilaterally, no rhinorrhea. ORAL CAVITY: mucosa moist, no lesions. THROAT: clear, no mass, no exudate. NECK/THYROID: neck supple, full range of motion, no thyromegaly.    HEART: no murmurs, regular rate and rhythm, S1, S2 normal.   LUNGS: clear to auscultation bilaterally, no wheezes, rales, rhonchi. ABDOMEN: normal, bowel sounds present, soft, nontender, nondistended, no rebound guarding or rigidity    Recent Labs/ In Office Testing/ Radiograph review:     Hospital Outpatient Visit on 05/10/2022   Component Date Value Ref Range Status    Cytology Report 05/10/2022    Final                    Value:INTERPRETATION    Cervical material, (ThinPrep vial, Imaging-assisted review):  Specimen Adequacy:       Satisfactory for evaluation.       -Endocervical/transformation zone component is absent. Descriptive Diagnosis:       Negative for intraepithelial lesion or malignancy. Cytotechnologist:   Rachell ALVARENGA(ASCP)  **Electronically Signed Out**  ey/5/13/2022          Source:  A: Cervical material, (ThinPrep vial, Imaging-assisted review)    Clinical History  Postmenopausal  Z01.419 Routine gyn exam without abnormal findings  High Risk HPV DNA testing is requested if the diagnosis is ASC-US    GYNECOLOGIC CYTOLOGY REPORT    Patient Name: Rachelle Alba [facility] Rec: [id number]  Path Number: [id number]  [address] [place] [address]. [place], [address]  [phone]  Fax: [phone]         No results found for this visit on 07/22/22. Assessment/Plan:     Manny Elliott was seen today for diabetes. Diagnoses and all orders for this visit:    Type 2 diabetes mellitus with hyperglycemia, with long-term current use of insulin (HCC)  -     Semaglutide, 2 MG/DOSE, (OZEMPIC, 2 MG/DOSE,) 8 MG/3ML SOPN; Inject 2 mg weekly  -     Hemoglobin A1C; Future  -     Microalbumin, Ur; Future  -     Semaglutide, 2 MG/DOSE, (OZEMPIC, 2 MG/DOSE,) 8 MG/3ML SOPN; Inject 2 mg weekly    Dyslipidemia  -     ALT; Future  -     AST; Future  -     CBC with Auto Differential; Future  -     Lipid Panel;  Future    HTN, goal below 130/80  -     Magnesium; Future  -     Renal Function Panel; Future    Skin cancer screening  -     Fredi Patton MD, Dermatology, [facility] as above. Sample as above. Follow up on labs. Referral to dermatology placed for skin cancer screening. All questions answered and addressed to patient satisfaction. Patient understands and agrees to the plan. The patient was evaluated and treated today based on the osteopathic principle that each person is a unit of body, mind, and spirit, the body is capable of self-regulation, self-healing, and health maintenance and that structure and function are reciprocally interrelated. Follow-up:   Return if symptoms worsen or fail to improve.       Aureliano Mcwilliams D.O.

## 2022-08-12 DIAGNOSIS — Z76.0 MEDICATION REFILL: ICD-10-CM

## 2022-08-13 DIAGNOSIS — Z76.0 MEDICATION REFILL: ICD-10-CM

## 2022-08-15 RX ORDER — GLIMEPIRIDE 2 MG/1
TABLET ORAL
Qty: 30 TABLET | Refills: 0 | OUTPATIENT
Start: 2022-08-15

## 2022-08-15 RX ORDER — GLIMEPIRIDE 2 MG/1
2 TABLET ORAL
Qty: 30 TABLET | Refills: 3 | Status: SHIPPED | OUTPATIENT
Start: 2022-08-15 | End: 2022-09-09 | Stop reason: SDUPTHER

## 2022-08-15 NOTE — TELEPHONE ENCOUNTER
Vanita Patterson is calling to request a refill on the following medication(s):    Medication Request:  Requested Prescriptions     Pending Prescriptions Disp Refills    glimepiride (AMARYL) 2 MG tablet 30 tablet 0     Sig: Take 1 tablet by mouth every morning (before breakfast)       Last Visit Date (If Applicable):  5/56/0936    Next Visit Date:    Has a wan with Alejo Leong on 11-28-22

## 2022-08-25 ENCOUNTER — OFFICE VISIT (OUTPATIENT)
Dept: VASCULAR SURGERY | Age: 52
End: 2022-08-25
Payer: COMMERCIAL

## 2022-08-25 ENCOUNTER — HOSPITAL ENCOUNTER (OUTPATIENT)
Age: 52
Setting detail: SPECIMEN
Discharge: HOME | End: 2022-08-25

## 2022-08-25 VITALS
RESPIRATION RATE: 17 BRPM | TEMPERATURE: 97.3 F | OXYGEN SATURATION: 96 % | DIASTOLIC BLOOD PRESSURE: 80 MMHG | HEIGHT: 65 IN | WEIGHT: 226 LBS | BODY MASS INDEX: 37.65 KG/M2 | HEART RATE: 78 BPM | SYSTOLIC BLOOD PRESSURE: 136 MMHG

## 2022-08-25 DIAGNOSIS — I10 HTN, GOAL BELOW 130/80: ICD-10-CM

## 2022-08-25 DIAGNOSIS — Z79.4 TYPE 2 DIABETES MELLITUS WITH HYPERGLYCEMIA, WITH LONG-TERM CURRENT USE OF INSULIN (HCC): ICD-10-CM

## 2022-08-25 DIAGNOSIS — I73.9 PAD (PERIPHERAL ARTERY DISEASE) (HCC): Primary | ICD-10-CM

## 2022-08-25 DIAGNOSIS — E78.5 DYSLIPIDEMIA: ICD-10-CM

## 2022-08-25 DIAGNOSIS — E11.65 TYPE 2 DIABETES MELLITUS WITH HYPERGLYCEMIA, WITH LONG-TERM CURRENT USE OF INSULIN (HCC): ICD-10-CM

## 2022-08-25 LAB
ABSOLUTE EOS #: 0.81 K/UL (ref 0–0.44)
ABSOLUTE IMMATURE GRANULOCYTE: 0.05 K/UL (ref 0–0.3)
ABSOLUTE LYMPH #: 3.43 K/UL (ref 1.1–3.7)
ABSOLUTE MONO #: 0.84 K/UL (ref 0.1–1.2)
ALBUMIN SERPL-MCNC: 4.5 G/DL (ref 3.5–5.2)
ALT SERPL-CCNC: 42 U/L (ref 5–33)
ANION GAP SERPL CALCULATED.3IONS-SCNC: 14 MMOL/L (ref 9–17)
AST SERPL-CCNC: 43 U/L
BASOPHILS # BLD: 1 % (ref 0–2)
BASOPHILS ABSOLUTE: 0.09 K/UL (ref 0–0.2)
BUN BLDV-MCNC: 13 MG/DL (ref 6–20)
CALCIUM SERPL-MCNC: 9 MG/DL (ref 8.6–10.4)
CHLORIDE BLD-SCNC: 105 MMOL/L (ref 98–107)
CHOLESTEROL/HDL RATIO: 5.4
CHOLESTEROL: 198 MG/DL
CO2: 27 MMOL/L (ref 20–31)
CREAT SERPL-MCNC: 0.65 MG/DL (ref 0.5–0.9)
CREATININE URINE: 149.3 MG/DL (ref 28–217)
EOSINOPHILS RELATIVE PERCENT: 7 % (ref 1–4)
ESTIMATED AVERAGE GLUCOSE: 171 MG/DL
GFR AFRICAN AMERICAN: >60 ML/MIN
GFR NON-AFRICAN AMERICAN: >60 ML/MIN
GFR SERPL CREATININE-BSD FRML MDRD: ABNORMAL ML/MIN/{1.73_M2}
GLUCOSE BLD-MCNC: 132 MG/DL (ref 70–99)
HBA1C MFR BLD: 7.6 % (ref 4–6)
HCT VFR BLD CALC: 45.6 % (ref 36.3–47.1)
HDLC SERPL-MCNC: 37 MG/DL
HEMOGLOBIN: 14.1 G/DL (ref 11.9–15.1)
IMMATURE GRANULOCYTES: 1 %
LDL CHOLESTEROL: 116 MG/DL (ref 0–130)
LYMPHOCYTES # BLD: 31 % (ref 24–43)
MAGNESIUM: 1.8 MG/DL (ref 1.6–2.6)
MCH RBC QN AUTO: 29.6 PG (ref 25.2–33.5)
MCHC RBC AUTO-ENTMCNC: 30.9 G/DL (ref 28.4–34.8)
MCV RBC AUTO: 95.6 FL (ref 82.6–102.9)
MICROALBUMIN/CREAT 24H UR: <12 MG/L
MICROALBUMIN/CREAT UR-RTO: NORMAL MCG/MG CREAT
MONOCYTES # BLD: 8 % (ref 3–12)
NRBC AUTOMATED: 0 PER 100 WBC
PDW BLD-RTO: 12.7 % (ref 11.8–14.4)
PHOSPHORUS: 3.5 MG/DL (ref 2.6–4.5)
PLATELET # BLD: 327 K/UL (ref 138–453)
PMV BLD AUTO: 11.3 FL (ref 8.1–13.5)
POTASSIUM SERPL-SCNC: 4.1 MMOL/L (ref 3.7–5.3)
RBC # BLD: 4.77 M/UL (ref 3.95–5.11)
SEG NEUTROPHILS: 52 % (ref 36–65)
SEGMENTED NEUTROPHILS ABSOLUTE COUNT: 5.71 K/UL (ref 1.5–8.1)
SODIUM BLD-SCNC: 146 MMOL/L (ref 135–144)
TRIGL SERPL-MCNC: 225 MG/DL
WBC # BLD: 10.9 K/UL (ref 3.5–11.3)

## 2022-08-25 PROCEDURE — G8427 DOCREV CUR MEDS BY ELIG CLIN: HCPCS | Performed by: SURGERY

## 2022-08-25 PROCEDURE — 99214 OFFICE O/P EST MOD 30 MIN: CPT | Performed by: SURGERY

## 2022-08-25 PROCEDURE — 3017F COLORECTAL CA SCREEN DOC REV: CPT | Performed by: SURGERY

## 2022-08-25 PROCEDURE — 1036F TOBACCO NON-USER: CPT | Performed by: SURGERY

## 2022-08-25 PROCEDURE — G8417 CALC BMI ABV UP PARAM F/U: HCPCS | Performed by: SURGERY

## 2022-08-25 NOTE — PROGRESS NOTES
Division of Vascular Surgery        Follow Up      Chief Complaint:     Peripheral arterial disease, claudication    History of Present Illness:      Marta Gaviria is a 47 y.o. woman who presents for follow up regarding her peripheral arterial disease and claudication. She has been doing quite well since our last visit. She is walking much farther and able to push herself even more. She is walking up to a mile 5 days a week. Pain does not kick in now until the last few minutes, before it would happen almost immediately. She denies symptoms suggestive of ischemic rest pain, she does not have any open wounds or sores on her feet. She is also walking in the grocery store without using the cart to help her get around. She developed discoloration of her toes 6-7 years ago in [facility], they were planning to do amputation instead improved with antibiotics and toe self amputated. She quit smoking 3 years ago as well and is trying to improve her health overall.     Medical History:     Past Medical History:   Diagnosis Date    Chronic deep vein thrombosis (DVT) of proximal vein of left lower extremity (HCC)     DDD (degenerative disc disease), thoracolumbar     Dyslipidemia     Essential hypertension     Gastroesophageal reflux disease without esophagitis     Mood disorder (HCC)     Neuropathy     Trigeminal neuralgia     Type 2 diabetes mellitus with hyperglycemia, with long-term current use of insulin ([facility])        Surgical History:     Past Surgical History:   Procedure Laterality Date    CATARACT REMOVAL WITH IMPLANT      CHOLECYSTECTOMY         Family History:     Family History   Problem Relation Age of Onset    COPD Mother     Other Mother         PAD    Diabetes type 2  Father        Allergies:       Latex, Amoxicillin, Atorvastatin, and Pineapple    Medications:      Current Outpatient Medications   Medication Sig Dispense Refill    glimepiride (AMARYL) 2 MG tablet Take 1 tablet by mouth every morning (before breakfast) 30 tablet 3    Semaglutide, 2 MG/DOSE, (OZEMPIC, 2 MG/DOSE,) 8 MG/3ML SOPN Inject 2 mg weekly 3 mL 0    Semaglutide, 2 MG/DOSE, (OZEMPIC, 2 MG/DOSE,) 8 MG/3ML SOPN Inject 2 mg weekly 9 mL 2    dicyclomine (BENTYL) 20 MG tablet TAKE ONE TABLET BY MOUTH FOUR TIMES A  tablet 0    busPIRone (BUSPAR) 30 MG tablet Take 30 mg by mouth 3 times daily 270 tablet 0    gabapentin (NEURONTIN) 600 MG tablet Take 1 po bid 60 tablet 2    BASAGLAR KWIKPEN 100 UNIT/ML injection pen INJECT 30 UNITS UNDER THE SKIN EVERY EVENING 15 mL 5    Cholecalciferol (DIALYVITE VITAMIN D 5000 PO) Take by mouth daily      Multiple Vitamins-Minerals (ONE-A-DAY WOMENS PO) Take by mouth      pravastatin (PRAVACHOL) 80 MG tablet Take 1 tablet by mouth daily 90 tablet 1    omeprazole (PRILOSEC) 20 MG delayed release capsule Take 1 capsule by mouth 2 times daily (before meals) 180 capsule 1    rivaroxaban (XARELTO) 2.5 MG TABS tablet Take 1 tablet by mouth 2 times daily 180 tablet 3    aspirin 81 MG chewable tablet Take 81 mg by mouth daily      clonazePAM (KLONOPIN) 0.5 MG tablet Take 1 tablet by mouth 2 times daily as needed for Anxiety for up to 14 days. 28 tablet 0     No current facility-administered medications for this visit. Social History:     Tobacco:    reports that she quit smoking about 3 years ago. Her smoking use included cigarettes. She started smoking about 17 years ago. She has a 15.00 pack-year smoking history. She has never used smokeless tobacco.  Alcohol:      reports no history of alcohol use. Drug Use:  reports current drug use. Drug: Marijuana Champ Cue). 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 HENT:  Negative for congestion. Eyes:  Negative for visual disturbance.    Respiratory:  Negative for chest tightness and shortness of breath. Cardiovascular:  Negative for chest pain and leg swelling. Gastrointestinal:  Negative for abdominal pain. Endocrine: Negative. Genitourinary: Negative. Musculoskeletal: Negative. Skin:  Negative for color change and wound. Allergic/Immunologic: Negative. Neurological:  Negative for facial asymmetry, speech difficulty, weakness and numbness. Hematological: Negative. Psychiatric/Behavioral: Negative. Physical Exam:     Vitals:  /80 (Site: Left Upper Arm, Position: Sitting, Cuff Size: Large Adult)   Pulse 78   Temp 97.3 °F (36.3 °C) (Temporal)   Resp 17   Ht 5' 5\" (1.651 m)   Wt 226 lb (102.5 kg)   SpO2 96%   BMI 37.61 kg/m²     Physical Exam  Constitutional:       Appearance: She is well-developed and well-groomed. Eyes:      Extraocular Movements: Extraocular movements intact. Conjunctiva/sclera: Conjunctivae normal.   Neck:      Vascular: No carotid bruit. Cardiovascular:      Rate and Rhythm: Normal rate and regular rhythm. Pulses:           Radial pulses are 2+ on the right side and 2+ on the left side. Dorsalis pedis pulses are detected w/ Doppler on the right side and detected w/ Doppler on the left side. Posterior tibial pulses are detected w/ Doppler on the right side and detected w/ Doppler on the left side. Pulmonary:      Effort: Pulmonary effort is normal. No respiratory distress. Abdominal:      Palpations: Abdomen is soft. Tenderness: There is no abdominal tenderness. Musculoskeletal:      Cervical back: Full passive range of motion without pain. Right lower leg: No swelling or tenderness. No edema. Left lower leg: No swelling or tenderness. No edema. Right foot: Normal capillary refill. No swelling or tenderness. Left foot: Normal capillary refill. No swelling or tenderness. Feet:      Right foot:      Skin integrity: No ulcer or skin breakdown.       Left foot:      Skin integrity: No ulcer or skin breakdown. Skin:     General: Skin is warm. Capillary Refill: Capillary refill takes less than 2 seconds. Neurological:      Mental Status: She is alert and oriented to person, place, and time. GCS: GCS eye subscore is 4. GCS verbal subscore is 5. GCS motor subscore is 6. Sensory: Sensation is intact. Motor: Motor function is intact. Psychiatric:         Mood and Affect: Mood normal.         Speech: Speech normal.         Behavior: Behavior normal.         Callus at tip of 5th toe cleaned up    Imaging/Labs:         Assessment and Plan:     Peripheral arterial disease, claudication  Optimal medical therapy with aspirin and statins  Continue to work on exercise and walking to improve overall cardiovascular health  Yearly surveillance with PVRs, sooner if symptoms get worse or if she develops wounds that are not healing on her feet    Electronically signed by Aundrea Baron MD on 8/25/22 at 11:44 AM EDT      [address] [facility]: [phone]  C: [phone]  Email: [email or web address]

## 2022-08-27 PROBLEM — I73.9 PAD (PERIPHERAL ARTERY DISEASE) (HCC): Status: ACTIVE | Noted: 2022-08-27

## 2022-08-27 ASSESSMENT — ENCOUNTER SYMPTOMS
CHEST TIGHTNESS: 0
SHORTNESS OF BREATH: 0
ALLERGIC/IMMUNOLOGIC NEGATIVE: 1
COLOR CHANGE: 0
ABDOMINAL PAIN: 0

## 2022-09-09 DIAGNOSIS — E78.5 DYSLIPIDEMIA: ICD-10-CM

## 2022-09-09 DIAGNOSIS — Z76.0 MEDICATION REFILL: ICD-10-CM

## 2022-09-09 DIAGNOSIS — Z79.4 TYPE 2 DIABETES MELLITUS WITH HYPERGLYCEMIA, WITH LONG-TERM CURRENT USE OF INSULIN (HCC): ICD-10-CM

## 2022-09-09 DIAGNOSIS — E11.65 TYPE 2 DIABETES MELLITUS WITH HYPERGLYCEMIA, WITH LONG-TERM CURRENT USE OF INSULIN (HCC): ICD-10-CM

## 2022-09-09 DIAGNOSIS — K21.9 GASTROESOPHAGEAL REFLUX DISEASE WITHOUT ESOPHAGITIS: ICD-10-CM

## 2022-09-12 RX ORDER — GLIMEPIRIDE 2 MG/1
2 TABLET ORAL
Qty: 30 TABLET | Refills: 3 | Status: SHIPPED | OUTPATIENT
Start: 2022-09-12

## 2022-09-12 RX ORDER — DICYCLOMINE HCL 20 MG
20 TABLET ORAL EVERY 6 HOURS
Qty: 120 TABLET | Refills: 0 | Status: SHIPPED | OUTPATIENT
Start: 2022-09-12

## 2022-09-12 RX ORDER — SEMAGLUTIDE 2.68 MG/ML
INJECTION, SOLUTION SUBCUTANEOUS
Qty: 3 ML | Refills: 0 | Status: SHIPPED | OUTPATIENT
Start: 2022-09-12

## 2022-09-12 RX ORDER — OMEPRAZOLE 20 MG/1
20 CAPSULE, DELAYED RELEASE ORAL
Qty: 180 CAPSULE | Refills: 1 | Status: SHIPPED | OUTPATIENT
Start: 2022-09-12

## 2022-09-12 RX ORDER — PRAVASTATIN SODIUM 80 MG/1
80 TABLET ORAL DAILY
Qty: 90 TABLET | Refills: 1 | Status: SHIPPED | OUTPATIENT
Start: 2022-09-12

## 2022-09-12 NOTE — TELEPHONE ENCOUNTER
Radha Jimy is calling to request a refill on the following medication(s):    Medication Request:  Requested Prescriptions     Pending Prescriptions Disp Refills    pravastatin (PRAVACHOL) 80 MG tablet 90 tablet 1     Sig: Take 1 tablet by mouth daily    omeprazole (PRILOSEC) 20 MG delayed release capsule 180 capsule 1     Sig: Take 1 capsule by mouth 2 times daily (before meals)    dicyclomine (BENTYL) 20 MG tablet 120 tablet 0    Semaglutide, 2 MG/DOSE, (OZEMPIC, 2 MG/DOSE,) 8 MG/3ML SOPN 3 mL 0     Sig: Inject 2 mg weekly       Last Visit Date (If Applicable):  3/48/4154    Next Visit Date:    11/28/2022

## 2022-09-12 NOTE — TELEPHONE ENCOUNTER
Pharmacy requesting refill of gabapentin 600 mg.       Medication active on med list yes      Date of last Rx: 5/23/2022 with 2 refills          verified by ALESSIO, STEPHAN      Date of last appointment 5/23/2022    Next Visit Date:  11/15/2022

## 2022-09-13 DIAGNOSIS — E08.42 DIABETIC POLYNEUROPATHY ASSOCIATED WITH DIABETES MELLITUS DUE TO UNDERLYING CONDITION (HCC): Primary | ICD-10-CM

## 2022-09-13 RX ORDER — GABAPENTIN 600 MG/1
TABLET ORAL
Qty: 60 TABLET | Refills: 1 | Status: SHIPPED | OUTPATIENT
Start: 2022-09-13 | End: 2022-09-14 | Stop reason: SDUPTHER

## 2022-09-13 NOTE — TELEPHONE ENCOUNTER
Pt called in stating that the Gabapentin that was sent to the pharmacy in [place] did not go through electronically. She is asking if we can fax a printed script with signature? She said that the pharmacist told her that we can do it this way and they will fill it.

## 2022-09-14 RX ORDER — GABAPENTIN 600 MG/1
TABLET ORAL
Qty: 180 TABLET | Refills: 0 | Status: SHIPPED | OUTPATIENT
Start: 2022-09-14 | End: 2022-12-12

## 2022-10-09 DIAGNOSIS — E08.42 DIABETIC POLYNEUROPATHY ASSOCIATED WITH DIABETES MELLITUS DUE TO UNDERLYING CONDITION (HCC): ICD-10-CM

## 2022-10-10 RX ORDER — GABAPENTIN 600 MG/1
TABLET ORAL
Qty: 60 TABLET | OUTPATIENT
Start: 2022-10-10

## 2022-10-14 DIAGNOSIS — I73.9 PAD (PERIPHERAL ARTERY DISEASE) (HCC): ICD-10-CM

## 2022-10-14 RX ORDER — RIVAROXABAN 2.5 MG/1
2.5 TABLET, FILM COATED ORAL 2 TIMES DAILY
Qty: 180 TABLET | Refills: 1 | Status: SHIPPED | OUTPATIENT
Start: 2022-10-14

## 2022-10-14 NOTE — TELEPHONE ENCOUNTER
[place] is calling to request a refill on the following medication(s):    Medication Request:  Requested Prescriptions     Pending Prescriptions Disp Refills    rivaroxaban (XARELTO) 2.5 MG TABS tablet 180 tablet 3     Sig: Take 1 tablet by mouth 2 times daily       Last Visit Date (If Applicable):  1/13/2508    Next Visit Date:    11/28/2022

## 2022-10-19 ENCOUNTER — TELEPHONE (OUTPATIENT)
Dept: FAMILY MEDICINE CLINIC | Age: 52
End: 2022-10-19

## 2022-10-19 NOTE — TELEPHONE ENCOUNTER
Patient calling, she can't get 2mg ozempic. It is backordered. She can still get the 1mg. Should she continue the 1mg?  Please advise

## 2022-11-10 DIAGNOSIS — Z76.0 MEDICATION REFILL: ICD-10-CM

## 2022-11-10 NOTE — TELEPHONE ENCOUNTER
Patient is out of town needs medicine sent to     [address] [place] [address] # [phone]    Thank you,  Roland Luna

## 2022-11-11 RX ORDER — DICYCLOMINE HCL 20 MG
20 TABLET ORAL EVERY 6 HOURS
Qty: 120 TABLET | Refills: 0 | Status: SHIPPED | OUTPATIENT
Start: 2022-11-11 | End: 2023-01-08 | Stop reason: SDUPTHER

## 2022-11-21 DIAGNOSIS — Z76.0 MEDICATION REFILL: ICD-10-CM

## 2022-11-21 DIAGNOSIS — E08.42 DIABETIC POLYNEUROPATHY ASSOCIATED WITH DIABETES MELLITUS DUE TO UNDERLYING CONDITION (HCC): ICD-10-CM

## 2022-11-21 RX ORDER — BUSPIRONE HYDROCHLORIDE 30 MG/1
30 TABLET ORAL 3 TIMES DAILY
Qty: 270 TABLET | Refills: 0 | Status: SHIPPED | OUTPATIENT
Start: 2022-11-21

## 2022-11-22 RX ORDER — GABAPENTIN 600 MG/1
TABLET ORAL
Qty: 180 TABLET | Refills: 0 | OUTPATIENT
Start: 2022-11-22 | End: 2023-02-18

## 2022-11-28 DIAGNOSIS — E78.5 DYSLIPIDEMIA: ICD-10-CM

## 2022-11-28 RX ORDER — PRAVASTATIN SODIUM 80 MG/1
80 TABLET ORAL DAILY
Qty: 90 TABLET | Refills: 1 | Status: SHIPPED | OUTPATIENT
Start: 2022-11-28

## 2022-11-28 NOTE — TELEPHONE ENCOUNTER
Lita Jenkins is calling to request a refill on the following medication(s):    Last Visit Date (If Applicable):  8/4/8586    Next Visit Date:    1/3/2023    Medication Request:  Requested Prescriptions     Pending Prescriptions Disp Refills    pravastatin (PRAVACHOL) 80 MG tablet 90 tablet 1     Sig: Take 1 tablet by mouth daily

## 2022-12-09 DIAGNOSIS — E08.42 DIABETIC POLYNEUROPATHY ASSOCIATED WITH DIABETES MELLITUS DUE TO UNDERLYING CONDITION (HCC): ICD-10-CM

## 2022-12-09 NOTE — TELEPHONE ENCOUNTER
Pharmacy requesting refill of gabapentin 600 mg.       Medication active on med list yes      Date of last Rx: 9/14/2022 with 0 refills          verified by SB, RMA      Date of last appointment 5/23/2022    Next Visit Date:  Visit date not found

## 2022-12-12 DIAGNOSIS — E08.42 DIABETIC POLYNEUROPATHY ASSOCIATED WITH DIABETES MELLITUS DUE TO UNDERLYING CONDITION (HCC): ICD-10-CM

## 2022-12-12 RX ORDER — GABAPENTIN 600 MG/1
TABLET ORAL
Qty: 180 TABLET | Refills: 0 | Status: SHIPPED | OUTPATIENT
Start: 2022-12-12 | End: 2023-03-08

## 2022-12-12 RX ORDER — GABAPENTIN 600 MG/1
TABLET ORAL
Qty: 60 TABLET | OUTPATIENT
Start: 2022-12-12

## 2023-01-03 ENCOUNTER — OFFICE VISIT (OUTPATIENT)
Dept: FAMILY MEDICINE CLINIC | Age: 53
End: 2023-01-03
Payer: MEDICARE

## 2023-01-03 ENCOUNTER — HOSPITAL ENCOUNTER (OUTPATIENT)
Dept: VASCULAR LAB | Age: 53
Discharge: HOME | End: 2023-01-03
Payer: MEDICARE

## 2023-01-03 ENCOUNTER — HOSPITAL ENCOUNTER (OUTPATIENT)
Age: 53
Setting detail: SPECIMEN
Discharge: HOME | End: 2023-01-03

## 2023-01-03 VITALS
BODY MASS INDEX: 35.82 KG/M2 | TEMPERATURE: 97.1 F | DIASTOLIC BLOOD PRESSURE: 60 MMHG | HEIGHT: 65 IN | OXYGEN SATURATION: 97 % | WEIGHT: 215 LBS | SYSTOLIC BLOOD PRESSURE: 112 MMHG | HEART RATE: 66 BPM

## 2023-01-03 DIAGNOSIS — I73.9 PAD (PERIPHERAL ARTERY DISEASE) (HCC): ICD-10-CM

## 2023-01-03 DIAGNOSIS — E11.65 TYPE 2 DIABETES MELLITUS WITH HYPERGLYCEMIA, WITH LONG-TERM CURRENT USE OF INSULIN (HCC): ICD-10-CM

## 2023-01-03 DIAGNOSIS — E11.65 TYPE 2 DIABETES MELLITUS WITH HYPERGLYCEMIA, WITH LONG-TERM CURRENT USE OF INSULIN (HCC): Primary | ICD-10-CM

## 2023-01-03 DIAGNOSIS — Z76.89 ENCOUNTER TO ESTABLISH CARE WITH NEW DOCTOR: ICD-10-CM

## 2023-01-03 DIAGNOSIS — K21.9 GASTROESOPHAGEAL REFLUX DISEASE WITHOUT ESOPHAGITIS: ICD-10-CM

## 2023-01-03 DIAGNOSIS — E66.01 SEVERE OBESITY (BMI 35.0-39.9) WITH COMORBIDITY (HCC): ICD-10-CM

## 2023-01-03 DIAGNOSIS — Z79.4 TYPE 2 DIABETES MELLITUS WITH HYPERGLYCEMIA, WITH LONG-TERM CURRENT USE OF INSULIN (HCC): Primary | ICD-10-CM

## 2023-01-03 DIAGNOSIS — E78.5 DYSLIPIDEMIA: ICD-10-CM

## 2023-01-03 DIAGNOSIS — F41.9 ANXIETY: ICD-10-CM

## 2023-01-03 DIAGNOSIS — Z79.4 TYPE 2 DIABETES MELLITUS WITH HYPERGLYCEMIA, WITH LONG-TERM CURRENT USE OF INSULIN (HCC): ICD-10-CM

## 2023-01-03 PROBLEM — N17.9 AKI (ACUTE KIDNEY INJURY) (HCC): Status: RESOLVED | Noted: 2021-12-12 | Resolved: 2023-01-03

## 2023-01-03 PROBLEM — R19.7 DIARRHEA OF PRESUMED INFECTIOUS ORIGIN: Status: RESOLVED | Noted: 2021-12-13 | Resolved: 2023-01-03

## 2023-01-03 LAB
ESTIMATED AVERAGE GLUCOSE: 151 MG/DL
HBA1C MFR BLD: 6.9 % (ref 4–6)

## 2023-01-03 PROCEDURE — 99214 OFFICE O/P EST MOD 30 MIN: CPT | Performed by: NURSE PRACTITIONER

## 2023-01-03 PROCEDURE — G8427 DOCREV CUR MEDS BY ELIG CLIN: HCPCS | Performed by: NURSE PRACTITIONER

## 2023-01-03 PROCEDURE — 93923 UPR/LXTR ART STDY 3+ LVLS: CPT

## 2023-01-03 PROCEDURE — 1036F TOBACCO NON-USER: CPT | Performed by: NURSE PRACTITIONER

## 2023-01-03 PROCEDURE — G8484 FLU IMMUNIZE NO ADMIN: HCPCS | Performed by: NURSE PRACTITIONER

## 2023-01-03 PROCEDURE — 3046F HEMOGLOBIN A1C LEVEL >9.0%: CPT | Performed by: NURSE PRACTITIONER

## 2023-01-03 PROCEDURE — G8417 CALC BMI ABV UP PARAM F/U: HCPCS | Performed by: NURSE PRACTITIONER

## 2023-01-03 PROCEDURE — 3017F COLORECTAL CA SCREEN DOC REV: CPT | Performed by: NURSE PRACTITIONER

## 2023-01-03 PROCEDURE — 2022F DILAT RTA XM EVC RTNOPTHY: CPT | Performed by: NURSE PRACTITIONER

## 2023-01-03 RX ORDER — PRAVASTATIN SODIUM 80 MG/1
80 TABLET ORAL DAILY
Qty: 90 TABLET | Refills: 1 | Status: SHIPPED | OUTPATIENT
Start: 2023-01-03

## 2023-01-03 RX ORDER — OMEPRAZOLE 20 MG/1
20 CAPSULE, DELAYED RELEASE ORAL
Qty: 180 CAPSULE | Refills: 1 | Status: SHIPPED | OUTPATIENT
Start: 2023-01-03

## 2023-01-03 RX ORDER — GLIMEPIRIDE 2 MG/1
2 TABLET ORAL
Qty: 30 TABLET | Refills: 3 | Status: SHIPPED | OUTPATIENT
Start: 2023-01-03 | End: 2023-01-04 | Stop reason: SDUPTHER

## 2023-01-03 RX ORDER — BUSPIRONE HYDROCHLORIDE 30 MG/1
30 TABLET ORAL 3 TIMES DAILY
Qty: 270 TABLET | Refills: 0 | Status: SHIPPED | OUTPATIENT
Start: 2023-01-03

## 2023-01-03 RX ORDER — SEMAGLUTIDE 2.68 MG/ML
INJECTION, SOLUTION SUBCUTANEOUS
Qty: 9 ML | Refills: 2 | Status: SHIPPED | OUTPATIENT
Start: 2023-01-03

## 2023-01-03 RX ORDER — INSULIN GLARGINE 100 [IU]/ML
INJECTION, SOLUTION SUBCUTANEOUS
Qty: 15 ML | Refills: 5 | Status: SHIPPED | OUTPATIENT
Start: 2023-01-03

## 2023-01-03 ASSESSMENT — PATIENT HEALTH QUESTIONNAIRE - PHQ9
SUM OF ALL RESPONSES TO PHQ9 QUESTIONS 1 & 2: 0
2. FEELING DOWN, DEPRESSED OR HOPELESS: 0
5. POOR APPETITE OR OVEREATING: 0
1. LITTLE INTEREST OR PLEASURE IN DOING THINGS: 0
3. TROUBLE FALLING OR STAYING ASLEEP: 1
SUM OF ALL RESPONSES TO PHQ QUESTIONS 1-9: 2
4. FEELING TIRED OR HAVING LITTLE ENERGY: 0
7. TROUBLE CONCENTRATING ON THINGS, SUCH AS READING THE NEWSPAPER OR WATCHING TELEVISION: 1
10. IF YOU CHECKED OFF ANY PROBLEMS, HOW DIFFICULT HAVE THESE PROBLEMS MADE IT FOR YOU TO DO YOUR WORK, TAKE CARE OF THINGS AT HOME, OR GET ALONG WITH OTHER PEOPLE: 0
9. THOUGHTS THAT YOU WOULD BE BETTER OFF DEAD, OR OF HURTING YOURSELF: 0
SUM OF ALL RESPONSES TO PHQ QUESTIONS 1-9: 2
8. MOVING OR SPEAKING SO SLOWLY THAT OTHER PEOPLE COULD HAVE NOTICED. OR THE OPPOSITE, BEING SO FIGETY OR RESTLESS THAT YOU HAVE BEEN MOVING AROUND A LOT MORE THAN USUAL: 0
SUM OF ALL RESPONSES TO PHQ QUESTIONS 1-9: 2
SUM OF ALL RESPONSES TO PHQ QUESTIONS 1-9: 2
6. FEELING BAD ABOUT YOURSELF - OR THAT YOU ARE A FAILURE OR HAVE LET YOURSELF OR YOUR FAMILY DOWN: 0

## 2023-01-03 NOTE — PROGRESS NOTES
[place] [address]  [address] [facility]. Dinesh Da Silva  [place] [address]  [phone]  [phone]    Sukh Maurer is a 47 y.o. female who is here with c/o of:    Chief Complaint: Establish Care (Previous pcp Dr Winsome Pandey)      Patient Accompanied by: n/a    HPI - Sukh Maurer is here today with c/o:    Patient here to establish care. Previous PCP: Dr Winsome Pandey  : Yes  Children: Yes  Employed: Disability  Exercise: Yes; Walks  Diet: Tries to eat a balanced diet  Smoker: No  Alcohol: No  Sleep: Averages 6-8 hours    Chronic Conditions:    Type 2 diabetes-  Compliant with medication. Averages 131 fasting. Stays active. Eats a balanced diet. PAD-  Follows with Vascular    Hx DVT-  Follows with Vascular. Compliant with Eliquis. Anxiety-  Compliant with medication. Reports anxiety is controlled. She does get anxiety with all the traveling her and her  are doing. GERD-  Compliant with medication. Denies reflux symptoms.      Specialists:    Neurology  Vascular  Derm    Health Concerns:    None    Health Maintenance Due   Topic Date Due    Diabetic foot exam  Never done    Diabetic Alb to Cr ratio (uACR) test  Never done    COVID-19 Vaccine (4 - Booster for [employer] series) 07/23/2022    Annual Wellness Visit (AWV)  12/27/2022        Patient Active Problem List:     Allergic rhinitis     Anxiety     Body mass index (BMI) of 37.0-37.9 in adult     Cataract fragments of right eye following cataract surgery     DDD (degenerative disc disease), cervical     Deep vein thrombosis (DVT) of left upper extremity (HCC)     Major depressive disorder     Diabetes mellitus, type 2 (HCC)     GERD (gastroesophageal reflux disease)     Hyperlipidemia     Morbid (severe) obesity due to excess calories (HCC)     Pelvic floor dysfunction     Polyneuropathy due to type 2 diabetes mellitus ([address]     Trigeminal neuralgia     Urinary incontinence     JOHN (acute kidney injury) ([address] Diarrhea of presumed infectious origin     PAD (peripheral artery disease) ([facility])     Past Medical History:   Diagnosis Date    Chronic deep vein thrombosis (DVT) of proximal vein of left lower extremity ([facility])     DDD (degenerative disc disease), thoracolumbar     Dyslipidemia     Essential hypertension     Gastroesophageal reflux disease without esophagitis     Mood disorder ([facility])     Neuropathy     Trigeminal neuralgia     Type 2 diabetes mellitus with hyperglycemia, with long-term current use of insulin ([facility])       Past Surgical History:   Procedure Laterality Date    CATARACT REMOVAL WITH IMPLANT      CHOLECYSTECTOMY       Family History   Problem Relation Age of Onset    COPD Mother     Other Mother         PAD    Diabetes type 2  Father      Social History     Tobacco Use    Smoking status: Former     Packs/day: 1.00     Years: 15.00     Pack years: 15.00     Types: Cigarettes     Start date: 2005     Quit date: 8/26/2019     Years since quitting: 3.3    Smokeless tobacco: Never    Tobacco comments:     estimated quit date   Substance Use Topics    Alcohol use: Never     ALLERGIES:    Allergies   Allergen Reactions    Latex Hives and Rash     After prolonged contact    After prolonged contact      Amoxicillin Hives    Atorvastatin Other (See Comments)     Stomach pain  Other reaction(s): Other (See Comments), Other (See Comments)  Stomach pain  Stomach pain      Pineapple Itching          Subjective   Review of Systems   Constitutional:  Negative for activity change, appetite change,unexpected weight change, chills, fever, and fatigue. HENT: Negative for ear pain, sore throat,  Rhinorrhea, sinus pain, sinus pressure, congestion. Eyes:  Negative for pain and discharge. Respiratory:  Negative for chest tightness, shortness of breath, wheezing, and cough. Cardiovascular:  Negative for chest pain, palpitations and leg swelling.    Gastrointestinal: Negative for abdominal pain, blood in stool, constipation,diarrhea, nausea and vomiting. Endocrine: Negative for cold intolerance, heat intolerance, polydipsia, polyphagia and polyuria. Genitourinary: Negative for difficulty urinating, dysuria, flank pain, frequency, hematuria and urgency. Musculoskeletal: Negative for arthralgias, back pain, joint swelling, myalgias, neck pain and neck stiffness. Skin: Negative for rash and wound. Allergic/Immunologic: Negative for environmental allergies and food allergies. Neurological:  Negative for dizziness, light-headedness, numbness and headaches. Hematological:  Negative for adenopathy. Does not bruise/bleed easily. Psychiatric/Behavioral: Negative for self-injury, sleep disturbance and suicidal ideas. Objective   Physical Exam   PHYSICAL EXAM:   Constitutional: Grace Amos is oriented to person, place, and time. Vital signs are normal. Appears well-developed and well-nourished. She is obese  Head: Normocephalic and atraumatic. Eyes:PERRL, EOMI, Conjunctiva normal, No discharge. Neck: Full passive range of motion. Non-tender on palpation. Neck supple. No thyromegaly present. Trachea normal.  Cardiovascular: Normal rate, regular rhythm, S1, S2, no murmur, no gallop, no friction rub, intact distal pulses. Pulmonary/Chest: Breath sounds are clear throughout, No respiratory distress, No wheezing, No chest tenderness. Effort normal  Abdominal: Soft. Normal appearance, bowel sounds are present and normoactive. There is no hepatosplenomegaly. There is no tenderness. There is no CVA tenderness. Musculoskeletal: Extremities appear regular and symmetric. No evident masses, lesions, foreign bodies, or other abnormalities. No edema. No tenderness on palpation. Joints are stable. Full ROM, strength and tone are within normal limits. Neurological: Alert and oriented to person, place, and time. Normal motor function, Normal sensory function, No focal deficits noted. He has normal strength.   Skin: Skin is warm, dry and intact. No obvious lesions on exposed skin  Psychiatric: Normal mood and affect. Speech is normal and behavior is normal.     Nursing note and vitals reviewed. Blood pressure 112/60, pulse 66, temperature 97.1 °F (36.2 °C), temperature source Temporal, height 5' 5\" (1.651 m), weight 215 lb (97.5 kg), SpO2 97 %, not currently breastfeeding. Body mass index is 35.78 kg/m². Wt Readings from Last 3 Encounters:   01/03/23 215 lb (97.5 kg)   08/25/22 226 lb (102.5 kg)   07/22/22 230 lb (104.3 kg)     BP Readings from Last 3 Encounters:   01/03/23 112/60   08/25/22 136/80   07/22/22 130/72       Hospital Outpatient Visit on 08/25/2022   Component Date Value Ref Range Status    Glucose 08/25/2022 132 (A)  70 - 99 mg/dL Final    BUN 08/25/2022 13  6 - 20 mg/dL Final    Creatinine 08/25/2022 0.65  0.50 - 0.90 mg/dL Final    Calcium 08/25/2022 9.0  8.6 - 10.4 mg/dL Final    Albumin 08/25/2022 4.5  3.5 - 5.2 g/dL Final    Phosphorus 08/25/2022 3.5  2.6 - 4.5 mg/dL Final    Sodium 08/25/2022 146 (A)  135 - 144 mmol/L Final    Potassium 08/25/2022 4.1  3.7 - 5.3 mmol/L Final    Chloride 08/25/2022 105  98 - 107 mmol/L Final    CO2 08/25/2022 27  20 - 31 mmol/L Final    Anion Gap 08/25/2022 14  9 - 17 mmol/L Final    GFR Non- 08/25/2022 >60  >60 mL/min Final    GFR  08/25/2022 >60  >60 mL/min Final    GFR Comment 08/25/2022        Final    Comment: Average GFR for 52-63 years old:   80 mL/min/1.73sq m  Chronic Kidney Disease:   <60 mL/min/1.73sq m  Kidney failure:   <15 mL/min/1.73sq m              eGFR calculated using average adult body mass.  Additional eGFR calculator available at:        [email or web address]            Magnesium 08/25/2022 1.8  1.6 - 2.6 mg/dL Final    Cholesterol 08/25/2022 198  <200 mg/dL Final    Comment:    Cholesterol Guidelines:      <200  Desirable   200-240  Borderline      >240  Undesirable         HDL 08/25/2022 37 (A) >40 mg/dL Final    Comment:    HDL Guidelines:    <40     Undesirable   40-59    Borderline    >59     Desirable         LDL Cholesterol 08/25/2022 116  0 - 130 mg/dL Final    Comment:    LDL Guidelines:     <100    Desirable   100-129   Near to/above Desirable   130-159   Borderline      >159   Undesirable     Direct (measured) LDL and calculated LDL are not interchangeable tests. Chol/HDL Ratio 08/25/2022 5.4 (A)  <5 Final            Triglycerides 08/25/2022 225 (A)  <150 mg/dL Final    Comment:    Triglyceride Guidelines:     <150   Desirable   150-199  Borderline   200-499  High     >499   Very high   Based on AHA Guidelines for fasting triglyceride, October 2012. Hemoglobin A1C 08/25/2022 7.6 (A)  4.0 - 6.0 % Final    Estimated Avg Glucose 08/25/2022 171  mg/dL Final    Comment: The ADA and AACC recommend providing the estimated average glucose result to permit better   patient understanding of their HBA1c result.       WBC 08/25/2022 10.9  3.5 - 11.3 k/uL Final    RBC 08/25/2022 4.77  3.95 - 5.11 m/uL Final    Hemoglobin 08/25/2022 14.1  11.9 - 15.1 g/dL Final    Hematocrit 08/25/2022 45.6  36.3 - 47.1 % Final    MCV 08/25/2022 95.6  82.6 - 102.9 fL Final    MCH 08/25/2022 29.6  25.2 - 33.5 pg Final    MCHC 08/25/2022 30.9  28.4 - 34.8 g/dL Final    RDW 08/25/2022 12.7  11.8 - 14.4 % Final    Platelets 97/70/0031 327  138 - 453 k/uL Final    MPV 08/25/2022 11.3  8.1 - 13.5 fL Final    NRBC Automated 08/25/2022 0.0  0.0 per 100 WBC Final    Seg Neutrophils 08/25/2022 52  36 - 65 % Final    Lymphocytes 08/25/2022 31  24 - 43 % Final    Monocytes 08/25/2022 8  3 - 12 % Final    Eosinophils % 08/25/2022 7 (A)  1 - 4 % Final    Basophils 08/25/2022 1  0 - 2 % Final    Immature Granulocytes 08/25/2022 1 (A)  0 % Final    Segs Absolute 08/25/2022 5.71  1.50 - 8.10 k/uL Final    Absolute Lymph # 08/25/2022 3.43  1.10 - 3.70 k/uL Final    Absolute Mono # 08/25/2022 0.84  0.10 - 1.20 k/uL Final    Absolute Eos # 08/25/2022 0.81 (A)  0.00 - 0.44 k/uL Final    Basophils Absolute 08/25/2022 0.09  0.00 - 0.20 k/uL Final    Absolute Immature Granulocyte 08/25/2022 0.05  0.00 - 0.30 k/uL Final    AST 08/25/2022 43 (A)  <32 U/L Final    ALT 08/25/2022 42 (A)  5 - 33 U/L Final    Microalb, Ur 08/25/2022 <12  <21 mg/L Final    Creatinine, Ur 08/25/2022 149.3  28.0 - 217.0 mg/dL Final    Microalb/Crt. Ratio 08/25/2022 Can not be calculated  <25 mcg/mg creat Final     No results found for this visit on 01/03/23. Completed Orders/Prescriptions   Orders Placed This Encounter   Medications    pravastatin (PRAVACHOL) 80 MG tablet     Sig: Take 1 tablet by mouth daily     Dispense:  90 tablet     Refill:  1    omeprazole (PRILOSEC) 20 MG delayed release capsule     Sig: Take 1 capsule by mouth 2 times daily (before meals)     Dispense:  180 capsule     Refill:  1    busPIRone (BUSPAR) 30 MG tablet     Sig: Take 30 mg by mouth 3 times daily     Dispense:  270 tablet     Refill:  0    glimepiride (AMARYL) 2 MG tablet     Sig: Take 1 tablet by mouth every morning (before breakfast)     Dispense:  30 tablet     Refill:  3    insulin glargine (BASAGLAR KWIKPEN) 100 UNIT/ML injection pen     Sig: INJECT 30 UNITS UNDER THE SKIN EVERY EVENING     Dispense:  15 mL     Refill:  5    Semaglutide, 2 MG/DOSE, (OZEMPIC, 2 MG/DOSE,) 8 MG/3ML SOPN     Sig: Inject 2 mg weekly     Dispense:  9 mL     Refill:  2                 AssessmentPlan/Medical Decision Making     1. Type 2 diabetes mellitus with hyperglycemia, with long-term current use of insulin (HCC)    - Hemoglobin A1C; Future  - glimepiride (AMARYL) 2 MG tablet; Take 1 tablet by mouth every morning (before breakfast)  Dispense: 30 tablet;  Refill: 3  - insulin glargine (BASAGLAR KWIKPEN) 100 UNIT/ML injection pen; INJECT 30 UNITS UNDER THE SKIN EVERY EVENING  Dispense: 15 mL; Refill: 5  - Semaglutide, 2 MG/DOSE, (OZEMPIC, 2 MG/DOSE,) 8 MG/3ML SOPN; Inject 2 mg weekly  Dispense: 9 mL; Refill: 2    2. Severe obesity (BMI 35.0-39. 9) with comorbidity ([facility]      3. PAD (peripheral artery disease) ([facility]    - Follows with Vascular    4. Gastroesophageal reflux disease without esophagitis    - Stable  - omeprazole (PRILOSEC) 20 MG delayed release capsule; Take 1 capsule by mouth 2 times daily (before meals)  Dispense: 180 capsule; Refill: 1    5. Anxiety    - Stable  - busPIRone (BUSPAR) 30 MG tablet; Take 30 mg by mouth 3 times daily  Dispense: 270 tablet; Refill: 0    6. Dyslipidemia    - Stable  - pravastatin (PRAVACHOL) 80 MG tablet; Take 1 tablet by mouth daily  Dispense: 90 tablet; Refill: 1    7. Encounter to establish care with new doctor      Return in about 3 months (around 4/3/2023) for chronic conditions. 1.  Lázaro De Leon received counseling on the following healthy behaviors: nutrition, exercise, and medication adherence  2. Patient given educational materials - see patient instructions  3. Was a self-tracking handout given in paper form or via "[employer]? No  If yes, see orders or list here. 4.  Discussed use, benefit, and side effects of prescribed medications. Barriers to medication compliance addressed. All patient questions answered. Pt voiced understanding. 5.  Reviewed prior labs, imaging, consultation, follow up, and health maintenance  6. Continue current medications, diet and exercise. 7. Discussed use, benefit, and side effects of prescribed medications. Barriers to medication compliance addressed. All her questions were answered. Pt voiced understanding. Lázaro De Leon will continue current medications, diet and exercise. Of the  30  minute duration appointment visit, Nirali Peacock CNP spent at least 50% of the face-to-face time in counseling, explanation of diagnosis, planning of further management, and answering all questions.           Signed:  Nirali Peacock CNP

## 2023-01-04 DIAGNOSIS — E11.65 TYPE 2 DIABETES MELLITUS WITH HYPERGLYCEMIA, WITH LONG-TERM CURRENT USE OF INSULIN (HCC): ICD-10-CM

## 2023-01-04 DIAGNOSIS — Z79.4 TYPE 2 DIABETES MELLITUS WITH HYPERGLYCEMIA, WITH LONG-TERM CURRENT USE OF INSULIN (HCC): ICD-10-CM

## 2023-01-04 RX ORDER — GLIMEPIRIDE 2 MG/1
2 TABLET ORAL
Qty: 90 TABLET | Refills: 1 | Status: SHIPPED | OUTPATIENT
Start: 2023-01-04 | End: 2023-04-04

## 2023-01-05 ENCOUNTER — OFFICE VISIT (OUTPATIENT)
Dept: VASCULAR SURGERY | Age: 53
End: 2023-01-05
Payer: MEDICARE

## 2023-01-05 VITALS
WEIGHT: 217 LBS | RESPIRATION RATE: 16 BRPM | DIASTOLIC BLOOD PRESSURE: 79 MMHG | TEMPERATURE: 97.2 F | SYSTOLIC BLOOD PRESSURE: 129 MMHG | HEIGHT: 65 IN | OXYGEN SATURATION: 97 % | BODY MASS INDEX: 36.15 KG/M2 | HEART RATE: 70 BPM

## 2023-01-05 DIAGNOSIS — I73.9 CLAUDICATION IN PERIPHERAL VASCULAR DISEASE (HCC): ICD-10-CM

## 2023-01-05 DIAGNOSIS — M79.676 PAIN OF FIFTH TOE: ICD-10-CM

## 2023-01-05 DIAGNOSIS — I73.9 PAD (PERIPHERAL ARTERY DISEASE) (HCC): Primary | ICD-10-CM

## 2023-01-05 PROCEDURE — G8484 FLU IMMUNIZE NO ADMIN: HCPCS | Performed by: SURGERY

## 2023-01-05 PROCEDURE — 3017F COLORECTAL CA SCREEN DOC REV: CPT | Performed by: SURGERY

## 2023-01-05 PROCEDURE — 1036F TOBACCO NON-USER: CPT | Performed by: SURGERY

## 2023-01-05 PROCEDURE — 99214 OFFICE O/P EST MOD 30 MIN: CPT | Performed by: SURGERY

## 2023-01-05 PROCEDURE — G8417 CALC BMI ABV UP PARAM F/U: HCPCS | Performed by: SURGERY

## 2023-01-05 PROCEDURE — G8427 DOCREV CUR MEDS BY ELIG CLIN: HCPCS | Performed by: SURGERY

## 2023-01-05 ASSESSMENT — ENCOUNTER SYMPTOMS
CHEST TIGHTNESS: 0
ALLERGIC/IMMUNOLOGIC NEGATIVE: 1
ABDOMINAL PAIN: 0
SHORTNESS OF BREATH: 0
COLOR CHANGE: 0

## 2023-01-05 NOTE — PROGRESS NOTES
Division of Vascular Surgery        Follow Up      Chief Complaint:     Pain and discoloration of right 5th toe, PAD, claudication    History of Present Illness:      Dee Dee Carreon is a 47 y.o. woman who presents for evaluation of right 5th toe pain and discoloration. She is well known to me, she has extensive peripheral arterial disease with history of left 5th toe amputation for dry gangrene of the distal tip of her left 5th toe many years ago. She noticed pain and discoloration a few weeks ago where they have been camping in [place] at [facility]. Pain and discoloration has improved since then, she denies any trauma to it. She has been walking quite a bit lately, going on trails and hikes. With the ground level not being even she notices her claudication much sooner. She denies any symptoms suggestive of rest pain, she does not have any open wounds or sores on her feet. Her left 5th toe self amputated years ago and there is a bony outgrowth noted. She has been compliant with her medications, was off her Xarelto for a few weeks because of insurance issues and that is when she noticed the discomfort more as well. Shes been travelling the [place] for the fall and winter, [place], [place], [place], [place], [facility], [place], [place], [place], [place], [place]    Still able to do a mile on treadmill in about 33 minutes, takes it slow    Dee Dee Carreon is a 47 y.o. woman who presents for follow up regarding her peripheral arterial disease and claudication. She has been doing quite well since our last visit. She is walking much farther and able to push herself even more. She is walking up to a mile 5 days a week. Pain does not kick in now until the last few minutes, before it would happen almost immediately. She denies symptoms suggestive of ischemic rest pain, she does not have any open wounds or sores on her feet.   She is also walking in the grocery store without using the cart to help her get around. She developed discoloration of her toes 6-7 years ago in [facility], they were planning to do amputation instead improved with antibiotics and toe self amputated. She quit smoking 3 years ago as well and is trying to improve her health overall.        Medical History:     Past Medical History:   Diagnosis Date    Chronic deep vein thrombosis (DVT) of proximal vein of left lower extremity (HCC)     DDD (degenerative disc disease), thoracolumbar     Dyslipidemia     Essential hypertension     Gastroesophageal reflux disease without esophagitis     Mood disorder ([facility])     Neuropathy     Trigeminal neuralgia     Type 2 diabetes mellitus with hyperglycemia, with long-term current use of insulin ([facility])        Surgical History:     Past Surgical History:   Procedure Laterality Date    CATARACT REMOVAL WITH IMPLANT      CHOLECYSTECTOMY         Family History:     Family History   Problem Relation Age of Onset    COPD Mother     Other Mother         PAD    Diabetes type 2  Father        Allergies:       Latex, Amoxicillin, Atorvastatin, and Pineapple    Medications:      Current Outpatient Medications   Medication Sig Dispense Refill    glimepiride (AMARYL) 2 MG tablet Take 1 tablet by mouth every morning (before breakfast) 90 tablet 1    pravastatin (PRAVACHOL) 80 MG tablet Take 1 tablet by mouth daily 90 tablet 1    omeprazole (PRILOSEC) 20 MG delayed release capsule Take 1 capsule by mouth 2 times daily (before meals) 180 capsule 1    busPIRone (BUSPAR) 30 MG tablet Take 30 mg by mouth 3 times daily 270 tablet 0    insulin glargine (BASAGLAR KWIKPEN) 100 UNIT/ML injection pen INJECT 30 UNITS UNDER THE SKIN EVERY EVENING 15 mL 5    Semaglutide, 2 MG/DOSE, (OZEMPIC, 2 MG/DOSE,) 8 MG/3ML SOPN Inject 2 mg weekly 9 mL 2    gabapentin (NEURONTIN) 600 MG tablet Take 1 po bid 180 tablet 0    dicyclomine (BENTYL) 20 MG tablet Take 1 tablet by mouth in the morning and 1 tablet at noon and 1 tablet in the evening and 1 tablet before bedtime. TAKE ONE TABLET BY MOUTH FOUR TIMES A DAY. 120 tablet 0    rivaroxaban (XARELTO) 2.5 MG TABS tablet Take 1 tablet by mouth 2 times daily 180 tablet 1    Cholecalciferol (DIALYVITE VITAMIN D 5000 PO) Take by mouth daily      Multiple Vitamins-Minerals (ONE-A-DAY WOMENS PO) Take by mouth      aspirin 81 MG chewable tablet Take 81 mg by mouth daily       No current facility-administered medications for this visit. Social History:     Tobacco:    reports that she quit smoking about 3 years ago. Her smoking use included cigarettes. She started smoking about 18 years ago. She has a 15.00 pack-year smoking history. She has never used smokeless tobacco.  Alcohol:      reports no history of alcohol use. Drug Use:  reports current drug use. Drug: Marijuana Crista Alex). 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 HENT:  Negative for congestion. Eyes:  Negative for visual disturbance. Respiratory:  Negative for chest tightness and shortness of breath. Cardiovascular:  Negative for chest pain and leg swelling. Gastrointestinal:  Negative for abdominal pain. Endocrine: Negative. Genitourinary: Negative. Musculoskeletal: Negative. Skin:  Negative for color change and wound. Allergic/Immunologic: Negative. Neurological:  Negative for facial asymmetry, speech difficulty, weakness and numbness. Hematological: Negative. Psychiatric/Behavioral: Negative.      Physical Exam:     Vitals:  /79 (Site: Left Upper Arm, Position: Sitting, Cuff Size: Medium Adult)   Pulse 70   Temp 97.2 °F (36.2 °C)   Resp 16   Ht 5' 5\" (1.651 m)   Wt 217 lb (98.4 kg)   SpO2 97%   BMI 36.11 kg/m²     Physical Exam  Constitutional:       Appearance: She is well-developed and well-groomed. Eyes:      Extraocular Movements: Extraocular movements intact. Conjunctiva/sclera: Conjunctivae normal.   Neck:      Vascular: No carotid bruit. Cardiovascular:      Rate and Rhythm: Normal rate and regular rhythm. Pulses:           Radial pulses are 2+ on the right side and 2+ on the left side. Dorsalis pedis pulses are detected w/ Doppler on the right side and detected w/ Doppler on the left side. Posterior tibial pulses are detected w/ Doppler on the right side and detected w/ Doppler on the left side. Pulmonary:      Effort: Pulmonary effort is normal. No respiratory distress. Abdominal:      Palpations: Abdomen is soft. Tenderness: There is no abdominal tenderness. Musculoskeletal:      Cervical back: Full passive range of motion without pain. Right lower leg: No swelling or tenderness. No edema. Left lower leg: No swelling or tenderness. No edema. Right foot: Normal capillary refill. No swelling or tenderness. Left foot: Normal capillary refill. No swelling or tenderness. Feet:      Right foot:      Skin integrity: No ulcer or skin breakdown. Left foot:      Skin integrity: No ulcer or skin breakdown. Skin:     General: Skin is warm. Capillary Refill: Capillary refill takes less than 2 seconds. Neurological:      Mental Status: She is alert and oriented to person, place, and time. GCS: GCS eye subscore is 4. GCS verbal subscore is 5. GCS motor subscore is 6. Sensory: Sensation is intact. Motor: Motor function is intact.    Psychiatric:         Mood and Affect: Mood normal.         Speech: Speech normal.         Behavior: Behavior normal.               Bony out growth from left 5th toe debrided down sharply with scissors to skin level        Imaging/Labs:     Stable ALIN and PVRs with blunted waveforms          Assessment and Plan:     Peripheral arterial disease, claudication, right 5th toe pain, left 5th toe partial amputation  Arterial disease is stable, no acute signs of ischemia  Suspect being on trails and hiking may have over done it  Continue with aspirin and PAD dosing of Xarelto at 2.5 mg twice a day  Walking and exercise daily to improve overall cardiovascular health  Ok to continue to travel and camp  She will follow up prior to her leaving again next fall unless something else occurs or her symptoms get worse  At this time I do not propose any invasive intervention and will continue medical management     Electronically signed by Callie Velez MD on 1/5/23 at 10:22 AM [address]: [phone]  C: [phone]  Email: [email or web address]

## 2023-01-08 DIAGNOSIS — Z76.0 MEDICATION REFILL: ICD-10-CM

## 2023-01-09 RX ORDER — DICYCLOMINE HCL 20 MG
20 TABLET ORAL EVERY 6 HOURS
Qty: 120 TABLET | Refills: 0 | Status: SHIPPED | OUTPATIENT
Start: 2023-01-09

## 2023-01-09 NOTE — TELEPHONE ENCOUNTER
Finesse Robles is calling to request a refill on the following medication(s):    Medication Request:  Requested Prescriptions     Pending Prescriptions Disp Refills    dicyclomine (BENTYL) 20 MG tablet 120 tablet 0     Sig: Take 1 tablet by mouth in the morning and 1 tablet at noon and 1 tablet in the evening and 1 tablet before bedtime. TAKE ONE TABLET BY MOUTH FOUR TIMES A DAY.        Last Visit Date (If Applicable):  0/9/6681    Next Visit Date:    4/10/2023

## 2023-02-28 DIAGNOSIS — E11.65 TYPE 2 DIABETES MELLITUS WITH HYPERGLYCEMIA, WITH LONG-TERM CURRENT USE OF INSULIN (HCC): ICD-10-CM

## 2023-02-28 DIAGNOSIS — Z79.4 TYPE 2 DIABETES MELLITUS WITH HYPERGLYCEMIA, WITH LONG-TERM CURRENT USE OF INSULIN (HCC): ICD-10-CM

## 2023-02-28 RX ORDER — GLIMEPIRIDE 2 MG/1
TABLET ORAL
Qty: 90 TABLET | Refills: 1 | OUTPATIENT
Start: 2023-02-28

## 2023-03-07 DIAGNOSIS — Z79.4 TYPE 2 DIABETES MELLITUS WITH HYPERGLYCEMIA, WITH LONG-TERM CURRENT USE OF INSULIN (HCC): Primary | ICD-10-CM

## 2023-03-07 DIAGNOSIS — E11.65 TYPE 2 DIABETES MELLITUS WITH HYPERGLYCEMIA, WITH LONG-TERM CURRENT USE OF INSULIN (HCC): Primary | ICD-10-CM

## 2023-03-07 RX ORDER — INSULIN GLARGINE 100 [IU]/ML
30 INJECTION, SOLUTION SUBCUTANEOUS NIGHTLY
Qty: 5 ADJUSTABLE DOSE PRE-FILLED PEN SYRINGE | Refills: 3 | Status: SHIPPED | OUTPATIENT
Start: 2023-03-07

## 2023-03-13 DIAGNOSIS — E08.42 DIABETIC POLYNEUROPATHY ASSOCIATED WITH DIABETES MELLITUS DUE TO UNDERLYING CONDITION (HCC): ICD-10-CM

## 2023-03-13 DIAGNOSIS — Z76.0 MEDICATION REFILL: ICD-10-CM

## 2023-03-13 RX ORDER — GABAPENTIN 600 MG/1
TABLET ORAL
Qty: 180 TABLET | Refills: 0 | Status: SHIPPED | OUTPATIENT
Start: 2023-03-13 | End: 2023-06-07

## 2023-03-13 RX ORDER — DICYCLOMINE HCL 20 MG
TABLET ORAL
Qty: 120 TABLET | Refills: 0 | Status: SHIPPED | OUTPATIENT
Start: 2023-03-13

## 2023-03-13 NOTE — TELEPHONE ENCOUNTER
Pharmacy requesting refill of gabapentin 600 mg.       Medication active on med list yes      Date of last Rx: 12/12/2022 with 0 refills          verified by SB, RMA      Date of last appointment 5/23/2022    Next Visit Date:  4/25/2023

## 2023-03-13 NOTE — TELEPHONE ENCOUNTER
Amanda Pompa is calling to request a refill on the following medication(s):    Medication Request:  Requested Prescriptions     Pending Prescriptions Disp Refills    dicyclomine (BENTYL) 20 MG tablet [Pharmacy Med Name: DICYCLOMINE 20MG TABLETS] 120 tablet 0     Sig: TAKE 1 TABLET BY MOUTH FOUR TIMES DAILY( AM, NOON, PM AND BEFORE BEDTIME)       Last Visit Date (If Applicable):  7/3/4059    Next Visit Date:    4/10/2023

## 2023-04-05 DIAGNOSIS — Z79.4 TYPE 2 DIABETES MELLITUS WITH HYPERGLYCEMIA, WITH LONG-TERM CURRENT USE OF INSULIN (HCC): Primary | ICD-10-CM

## 2023-04-05 DIAGNOSIS — E11.65 TYPE 2 DIABETES MELLITUS WITH HYPERGLYCEMIA, WITH LONG-TERM CURRENT USE OF INSULIN (HCC): Primary | ICD-10-CM

## 2023-04-07 ENCOUNTER — HOSPITAL ENCOUNTER (OUTPATIENT)
Age: 53
Discharge: HOME | End: 2023-04-07
Payer: MEDICARE

## 2023-04-07 DIAGNOSIS — E11.65 TYPE 2 DIABETES MELLITUS WITH HYPERGLYCEMIA, WITH LONG-TERM CURRENT USE OF INSULIN (HCC): ICD-10-CM

## 2023-04-07 DIAGNOSIS — Z79.4 TYPE 2 DIABETES MELLITUS WITH HYPERGLYCEMIA, WITH LONG-TERM CURRENT USE OF INSULIN (HCC): ICD-10-CM

## 2023-04-07 LAB
ABSOLUTE EOS #: 0.23 K/UL (ref 0–0.44)
ABSOLUTE IMMATURE GRANULOCYTE: 0.03 K/UL (ref 0–0.3)
ABSOLUTE LYMPH #: 3.05 K/UL (ref 1.1–3.7)
ABSOLUTE MONO #: 0.75 K/UL (ref 0.1–1.2)
ALBUMIN SERPL-MCNC: 4 G/DL (ref 3.5–5.2)
ALBUMIN/GLOBULIN RATIO: 1.3 (ref 1–2.5)
ALP SERPL-CCNC: 95 U/L (ref 35–104)
ALT SERPL-CCNC: 19 U/L (ref 5–33)
ANION GAP SERPL CALCULATED.3IONS-SCNC: 8 MMOL/L (ref 9–17)
AST SERPL-CCNC: 20 U/L
BASOPHILS # BLD: 1 % (ref 0–2)
BASOPHILS ABSOLUTE: 0.07 K/UL (ref 0–0.2)
BILIRUB SERPL-MCNC: <0.1 MG/DL (ref 0.3–1.2)
BUN SERPL-MCNC: 10 MG/DL (ref 6–20)
CALCIUM SERPL-MCNC: 9.2 MG/DL (ref 8.6–10.4)
CHLORIDE SERPL-SCNC: 101 MMOL/L (ref 98–107)
CHOLEST SERPL-MCNC: 183 MG/DL
CHOLESTEROL/HDL RATIO: 5.9
CO2 SERPL-SCNC: 31 MMOL/L (ref 20–31)
CREAT SERPL-MCNC: 0.72 MG/DL (ref 0.5–0.9)
EOSINOPHILS RELATIVE PERCENT: 3 % (ref 1–4)
EST. AVERAGE GLUCOSE BLD GHB EST-MCNC: 137 MG/DL
GFR SERPL CREATININE-BSD FRML MDRD: >60 ML/MIN/1.73M2
GLUCOSE SERPL-MCNC: 120 MG/DL (ref 70–99)
HBA1C MFR BLD: 6.4 % (ref 4–6)
HCT VFR BLD AUTO: 44 % (ref 36.3–47.1)
HDLC SERPL-MCNC: 31 MG/DL
HGB BLD-MCNC: 14 G/DL (ref 11.9–15.1)
IMMATURE GRANULOCYTES: 0 %
LDLC SERPL CALC-MCNC: 105 MG/DL (ref 0–130)
LYMPHOCYTES # BLD: 33 % (ref 24–43)
MCH RBC QN AUTO: 28.9 PG (ref 25.2–33.5)
MCHC RBC AUTO-ENTMCNC: 31.8 G/DL (ref 28.4–34.8)
MCV RBC AUTO: 90.9 FL (ref 82.6–102.9)
MONOCYTES # BLD: 8 % (ref 3–12)
NRBC AUTOMATED: 0 PER 100 WBC
PDW BLD-RTO: 12.5 % (ref 11.8–14.4)
PLATELET # BLD AUTO: 365 K/UL (ref 138–453)
PMV BLD AUTO: 10.4 FL (ref 8.1–13.5)
POTASSIUM SERPL-SCNC: 3.9 MMOL/L (ref 3.7–5.3)
PROT SERPL-MCNC: 7 G/DL (ref 6.4–8.3)
RBC # BLD: 4.84 M/UL (ref 3.95–5.11)
SEG NEUTROPHILS: 55 % (ref 36–65)
SEGMENTED NEUTROPHILS ABSOLUTE COUNT: 5.07 K/UL (ref 1.5–8.1)
SODIUM SERPL-SCNC: 140 MMOL/L (ref 135–144)
TRIGL SERPL-MCNC: 233 MG/DL
TSH SERPL-ACNC: 1.07 UIU/ML (ref 0.3–5)
WBC # BLD AUTO: 9.2 K/UL (ref 3.5–11.3)

## 2023-04-07 PROCEDURE — 80053 COMPREHEN METABOLIC PANEL: CPT

## 2023-04-07 PROCEDURE — 83036 HEMOGLOBIN GLYCOSYLATED A1C: CPT

## 2023-04-07 PROCEDURE — 85025 COMPLETE CBC W/AUTO DIFF WBC: CPT

## 2023-04-07 PROCEDURE — 80061 LIPID PANEL: CPT

## 2023-04-07 PROCEDURE — 84443 ASSAY THYROID STIM HORMONE: CPT

## 2023-04-07 PROCEDURE — 36415 COLL VENOUS BLD VENIPUNCTURE: CPT

## 2023-04-10 PROBLEM — E11.40 TYPE 2 DIABETES MELLITUS WITH DIABETIC NEUROPATHY (HCC): Status: ACTIVE | Noted: 2023-04-10

## 2023-04-10 PROBLEM — M79.676 PAIN OF FIFTH TOE: Status: RESOLVED | Noted: 2023-01-05 | Resolved: 2023-04-10

## 2023-04-18 DIAGNOSIS — F40.240 CLAUSTROPHOBIA: Primary | ICD-10-CM

## 2023-04-18 RX ORDER — DIAZEPAM 10 MG/1
10 TABLET ORAL ONCE
Qty: 1 TABLET | Refills: 0 | Status: SHIPPED | OUTPATIENT
Start: 2023-04-18 | End: 2023-04-18

## 2023-04-25 ENCOUNTER — OFFICE VISIT (OUTPATIENT)
Dept: NEUROLOGY | Age: 53
End: 2023-04-25
Payer: MEDICARE

## 2023-04-25 VITALS
DIASTOLIC BLOOD PRESSURE: 65 MMHG | SYSTOLIC BLOOD PRESSURE: 113 MMHG | BODY MASS INDEX: 32.92 KG/M2 | WEIGHT: 197.6 LBS | HEIGHT: 65 IN | HEART RATE: 60 BPM

## 2023-04-25 DIAGNOSIS — M54.2 NECK PAIN: ICD-10-CM

## 2023-04-25 DIAGNOSIS — E08.42 DIABETIC POLYNEUROPATHY ASSOCIATED WITH DIABETES MELLITUS DUE TO UNDERLYING CONDITION (HCC): Primary | ICD-10-CM

## 2023-04-25 DIAGNOSIS — M48.061 SPINAL STENOSIS OF LUMBAR REGION, UNSPECIFIED WHETHER NEUROGENIC CLAUDICATION PRESENT: ICD-10-CM

## 2023-04-25 PROCEDURE — 1036F TOBACCO NON-USER: CPT | Performed by: PSYCHIATRY & NEUROLOGY

## 2023-04-25 PROCEDURE — G8427 DOCREV CUR MEDS BY ELIG CLIN: HCPCS | Performed by: PSYCHIATRY & NEUROLOGY

## 2023-04-25 PROCEDURE — G8417 CALC BMI ABV UP PARAM F/U: HCPCS | Performed by: PSYCHIATRY & NEUROLOGY

## 2023-04-25 PROCEDURE — 3017F COLORECTAL CA SCREEN DOC REV: CPT | Performed by: PSYCHIATRY & NEUROLOGY

## 2023-04-25 PROCEDURE — 99214 OFFICE O/P EST MOD 30 MIN: CPT | Performed by: PSYCHIATRY & NEUROLOGY

## 2023-04-25 PROCEDURE — 2022F DILAT RTA XM EVC RTNOPTHY: CPT | Performed by: PSYCHIATRY & NEUROLOGY

## 2023-04-25 ASSESSMENT — ENCOUNTER SYMPTOMS
EYES NEGATIVE: 1
ALLERGIC/IMMUNOLOGIC NEGATIVE: 1
GASTROINTESTINAL NEGATIVE: 1
RESPIRATORY NEGATIVE: 1

## 2023-04-25 NOTE — PROGRESS NOTES
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 The condition is she has modified her diet having lost 50 lbs over the past one year weighing 197 lbs , BMI 32. . Numbness will below ankle bilaterally . She reports that burnng stabbing pain in her feet is 70 % attenuated on neurontin 600 mg po bid tolerating medication well . There is numbness in left foot below ankle with right foot distal mid foot . Hga1c is 6.4 on ozempic, glimepride, insulin and glymepride . There is pain right arm pain with neck pain affecting right neck and right shoulder area of stabbing component grade 6 over 10 having discomfort on turning head to left neck . There has been neck pain with flairup over the last week . She has not had PT . There will be low back pain with ambulation after walking 100 feet in right low back area of grade 8 over 10 attenuated with respite . There is no leg giveway with no radiation down legs . She has MRI of cervical spine pending . There is urinary urgency  Significant medications neurontin 600 mg po bid . Testing EMG/NCV prolongued sural sensory latency and borderline normal amplitude left perioneal and fibular motor study suggestive of sensory motor polyneuropathy , November 2021 . B12 320, ESR 5, HUGO negative , folic acid > 17 , SPEP normal , Hga1c 8.1 . MRI cervical spine diffuse degenerative posterior disc bulge C5-6 with mild central canal narrowing .  C6-7 small central disc protrusion with left foraminal encroachment , June 2019      Past Medical History:   Diagnosis Date    Chronic deep vein thrombosis (DVT) of proximal vein of left lower extremity (HCC)     DDD (degenerative disc disease), thoracolumbar     Dyslipidemia     Essential hypertension     Gastroesophageal reflux disease without esophagitis     Mood disorder (HCC)     Neuropathy     Trigeminal neuralgia     Type 2 diabetes mellitus with hyperglycemia, with long-term current use of insulin ([address]        Past Surgical History:

## 2023-04-26 ENCOUNTER — HOSPITAL ENCOUNTER (OUTPATIENT)
Dept: MRI IMAGING | Age: 53
Discharge: HOME | End: 2023-04-28
Payer: MEDICARE

## 2023-04-26 DIAGNOSIS — M54.2 NECK PAIN: ICD-10-CM

## 2023-04-26 DIAGNOSIS — M50.30 DDD (DEGENERATIVE DISC DISEASE), CERVICAL: ICD-10-CM

## 2023-04-26 DIAGNOSIS — R93.7 ABNORMAL MRI, CERVICAL SPINE: Primary | ICD-10-CM

## 2023-04-26 DIAGNOSIS — M48.061 SPINAL STENOSIS OF LUMBAR REGION, UNSPECIFIED WHETHER NEUROGENIC CLAUDICATION PRESENT: ICD-10-CM

## 2023-04-26 PROCEDURE — 72141 MRI NECK SPINE W/O DYE: CPT

## 2023-04-26 PROCEDURE — 72148 MRI LUMBAR SPINE W/O DYE: CPT

## 2023-04-27 ENCOUNTER — PATIENT MESSAGE (OUTPATIENT)
Dept: NEUROLOGY | Age: 53
End: 2023-04-27

## 2023-05-04 NOTE — TELEPHONE ENCOUNTER
Message to nurse . Already on neurontin 600 mg po bid for neuropathic pain . MRI lumbar spine with bulging L3-4 L4-5 . Suspect it may come down to medical treatment but will see what neurosurgery consultation says . Can go up on neurontin to 600 mg po tid if she wishes. If so send script neurntin 600 g #90 .  Take 1 po tid 2 RF

## 2023-05-04 NOTE — TELEPHONE ENCOUNTER
I called Kiki Baker to explain Dr. Dennis Hahn message. She appreciated the call. She said for now she does not want to increase her gabapentin. She would lke to see if PT might help her out. I did advise her she can call us later if she changes her mind. She voiced understanding.

## 2023-05-06 DIAGNOSIS — Z76.0 MEDICATION REFILL: ICD-10-CM

## 2023-05-06 DIAGNOSIS — I73.9 PAD (PERIPHERAL ARTERY DISEASE) (HCC): ICD-10-CM

## 2023-05-08 RX ORDER — RIVAROXABAN 2.5 MG/1
TABLET, FILM COATED ORAL
Qty: 60 TABLET | Refills: 3 | Status: SHIPPED | OUTPATIENT
Start: 2023-05-08

## 2023-05-08 RX ORDER — DICYCLOMINE HCL 20 MG
TABLET ORAL
Qty: 120 TABLET | Refills: 0 | Status: SHIPPED | OUTPATIENT
Start: 2023-05-08

## 2023-05-08 NOTE — TELEPHONE ENCOUNTER
Jannette Sagastume is calling to request a refill on the following medication(s):    Medication Request:  Requested Prescriptions     Pending Prescriptions Disp Refills    dicyclomine (BENTYL) 20 MG tablet [Pharmacy Med Name: DICYCLOMINE 20MG TABLETS] 120 tablet 0     Sig: TAKE 1 TABLET BY MOUTH FOUR TIMES DAILY       Last Visit Date (If Applicable):  6/91/30    Next Visit Date:    5/15/23

## 2023-05-08 NOTE — TELEPHONE ENCOUNTER
Ben Strong is calling to request a refill on the following medication(s):    Medication Request:  Requested Prescriptions     Pending Prescriptions Disp Refills    XARELTO 2.5 MG TABS tablet [Pharmacy Med Name: Christophe Aleman 2.5MG TABLETS] 60 tablet 3     Sig: TAKE 1 TABLET BY MOUTH TWICE DAILY       Last Visit Date (If Applicable):  0/12/8467    Next Visit Date:    5/15/2023

## 2023-05-15 ENCOUNTER — OFFICE VISIT (OUTPATIENT)
Dept: FAMILY MEDICINE CLINIC | Age: 53
End: 2023-05-15
Payer: MEDICARE

## 2023-05-15 VITALS
HEART RATE: 78 BPM | TEMPERATURE: 97.4 F | BODY MASS INDEX: 32.05 KG/M2 | SYSTOLIC BLOOD PRESSURE: 104 MMHG | DIASTOLIC BLOOD PRESSURE: 60 MMHG | WEIGHT: 192.6 LBS | OXYGEN SATURATION: 98 %

## 2023-05-15 DIAGNOSIS — E11.65 TYPE 2 DIABETES MELLITUS WITH HYPERGLYCEMIA, WITH LONG-TERM CURRENT USE OF INSULIN (HCC): ICD-10-CM

## 2023-05-15 DIAGNOSIS — Z79.4 TYPE 2 DIABETES MELLITUS WITH HYPERGLYCEMIA, WITH LONG-TERM CURRENT USE OF INSULIN (HCC): ICD-10-CM

## 2023-05-15 DIAGNOSIS — Z00.00 WELCOME TO MEDICARE PREVENTIVE VISIT: Primary | ICD-10-CM

## 2023-05-15 PROCEDURE — G0438 PPPS, INITIAL VISIT: HCPCS | Performed by: NURSE PRACTITIONER

## 2023-05-15 PROCEDURE — 3017F COLORECTAL CA SCREEN DOC REV: CPT | Performed by: NURSE PRACTITIONER

## 2023-05-15 PROCEDURE — 3044F HG A1C LEVEL LT 7.0%: CPT | Performed by: NURSE PRACTITIONER

## 2023-05-15 RX ORDER — SEMAGLUTIDE 2.68 MG/ML
INJECTION, SOLUTION SUBCUTANEOUS
Qty: 9 ML | Refills: 2 | Status: SHIPPED | OUTPATIENT
Start: 2023-05-15

## 2023-05-15 ASSESSMENT — PATIENT HEALTH QUESTIONNAIRE - PHQ9
8. MOVING OR SPEAKING SO SLOWLY THAT OTHER PEOPLE COULD HAVE NOTICED. OR THE OPPOSITE, BEING SO FIGETY OR RESTLESS THAT YOU HAVE BEEN MOVING AROUND A LOT MORE THAN USUAL: 0
9. THOUGHTS THAT YOU WOULD BE BETTER OFF DEAD, OR OF HURTING YOURSELF: 0
4. FEELING TIRED OR HAVING LITTLE ENERGY: 0
10. IF YOU CHECKED OFF ANY PROBLEMS, HOW DIFFICULT HAVE THESE PROBLEMS MADE IT FOR YOU TO DO YOUR WORK, TAKE CARE OF THINGS AT HOME, OR GET ALONG WITH OTHER PEOPLE: 1
5. POOR APPETITE OR OVEREATING: 0
1. LITTLE INTEREST OR PLEASURE IN DOING THINGS: 0
7. TROUBLE CONCENTRATING ON THINGS, SUCH AS READING THE NEWSPAPER OR WATCHING TELEVISION: 2
SUM OF ALL RESPONSES TO PHQ9 QUESTIONS 1 & 2: 0
SUM OF ALL RESPONSES TO PHQ QUESTIONS 1-9: 4
6. FEELING BAD ABOUT YOURSELF - OR THAT YOU ARE A FAILURE OR HAVE LET YOURSELF OR YOUR FAMILY DOWN: 0
3. TROUBLE FALLING OR STAYING ASLEEP: 2
2. FEELING DOWN, DEPRESSED OR HOPELESS: 0
SUM OF ALL RESPONSES TO PHQ QUESTIONS 1-9: 4

## 2023-05-15 ASSESSMENT — LIFESTYLE VARIABLES
HOW OFTEN DO YOU HAVE A DRINK CONTAINING ALCOHOL: NEVER
HOW MANY STANDARD DRINKS CONTAINING ALCOHOL DO YOU HAVE ON A TYPICAL DAY: PATIENT DOES NOT DRINK

## 2023-05-15 NOTE — PROGRESS NOTES
Medicare Annual Wellness Visit    Gus Mckeon is here for Medicare AWV    Assessment & Plan   Welcome to Medicare preventive visit  Type 2 diabetes mellitus with hyperglycemia, with long-term current use of insulin ([address]  -     Semaglutide, 2 MG/DOSE, (OZEMPIC, 2 MG/DOSE,) 8 MG/3ML SOPN; Inject 2 mg weekly, Disp-9 mL, R-2Normal      Recommendations for Preventive Services Due: see orders and patient instructions/AVS.  Recommended screening schedule for the next 5-10 years is provided to the patient in written form: see Patient Instructions/AVS.     Return in about 3 months (around 8/15/2023) for chronic conditions. Subjective       Patient's complete Health Risk Assessment and screening values have been reviewed and are found in Flowsheets. The following problems were reviewed today and where indicated follow up appointments were made and/or referrals ordered. Positive Risk Factor Screenings with Interventions:    Fall Risk:  Do you feel unsteady or are you worried about falling? : (!) yes  2 or more falls in past year?: no  Fall with injury in past year?: no     Interventions:    Patient comments: Worried about her balanced with her chronic neck issues. Drug Use:          Interpretation:  1-2: Low level - Monitor, re-assess at a later date  3-5: Moderate level - Further Investigation  6-8: Substantial level - Intensive Assessment  9-10: Severe level - Intensive Assessment    Interventions:  Patient declined any further intervention or treatment          Weight and Activity:  Physical Activity: Insufficiently Active    Days of Exercise per Week: 2 days    Minutes of Exercise per Session: 30 min     On average, how many days per week do you engage in moderate to strenuous exercise (like a brisk walk)?: 2 days  Have you lost any weight without trying in the past 3 months?: No  Body mass index is 32.05 kg/m².  (!) Abnormal  Obesity Interventions:  Patient comments: continues to lose weight through

## 2023-05-15 NOTE — PATIENT INSTRUCTIONS
have a serious illness that gets worse over time or can't be cured? What are you most afraid of that might happen? (Maybe you're afraid of having pain, losing your independence, or being kept alive by machines.)  Where would you prefer to die? (Your home? A hospital? A nursing home?)  Do you want to donate your organs when you die? Do you want certain Yazidism practices performed before you die? When should you call for help? Be sure to contact your doctor if you have any questions. Where can you learn more? Go to http://www.asencio.com/ and enter R264 to learn more about \"Advance Directives: Care Instructions. \"  Current as of: June 16, 2022               Content Version: 13.6  © 2006-2023 [employer]. Care instructions adapted under license by [facility] ([facility]). If you have questions about a medical condition or this instruction, always ask your healthcare professional. [place] [address] any warranty or liability for your use of this information. A Healthy Heart: Care Instructions  Your Care Instructions     Coronary artery disease, also called heart disease, occurs when a substance called plaque builds up in the vessels that supply oxygen-rich blood to your heart muscle. This can narrow the blood vessels and reduce blood flow. A heart attack happens when blood flow is completely blocked. A high-fat diet, smoking, and other factors increase the risk of heart disease. Your doctor has found that you have a chance of having heart disease. You can do lots of things to keep your heart healthy. It may not be easy, but you can change your diet, exercise more, and quit smoking. These steps really work to lower your chance of heart disease. Follow-up care is a key part of your treatment and safety. Be sure to make and go to all appointments, and call your doctor if you are having problems.  It's also a good idea to know your test results and keep a list of the

## 2023-05-26 ENCOUNTER — HOSPITAL ENCOUNTER (OUTPATIENT)
Dept: PHYSICAL THERAPY | Facility: CLINIC | Age: 53
Setting detail: THERAPIES SERIES
Discharge: HOME | End: 2023-05-26
Payer: MEDICARE

## 2023-05-26 PROCEDURE — 97110 THERAPEUTIC EXERCISES: CPT

## 2023-05-26 PROCEDURE — 97161 PT EVAL LOW COMPLEX 20 MIN: CPT

## 2023-05-26 NOTE — CONSULTS
mAmin  [id number]   [x] Manual Therapy  91816 [] Vasopneumatic cold with compression  67520    [x] Gait Training   73523 [] Ultrasound   71908   [x] Neuromuscular Re-education  24514 [] Electrical Stimulation Unattended  27247   [x]  Therapeutic Activity  86906 [] Electrical Stimulation Attended  87395   [x] Instruction in HEP  [] Lumbar/Cervical Traction  [id number]   [] Aquatic Therapy   21384 [] Cold/hotpack        Frequency:  2 x/week for 20 visits      Todays Treatment:    Exercise   Reps/ Time Weight/ Level Comments         Bike/Nustep            Corner Pectoral Stretch       Seated chin tucks    HEP   Scapular Retractions    HEP   Supine Chin Tucks      Cervical AROM    HEP   Upper Trap Stretch    HEP   Levator Scap Stretch    HEP               Band:                                                            Specific Instructions for next treatment: therex, postural education     Evaluation Complexity:  History (Personal factors, comorbidities) [x] 0 [] 1-2 [] 3+   Exam (limitations, restrictions) [x] 1-2 [] 3 [] 4+   Clinical presentation (progression) [x] Stable [] Evolving  [] Unstable   Decision Making [x] Low [] Moderate [] High    [x] Low Complexity [] Moderate Complexity [] High Complexity       Treatment Charges: Mins Units   [x] Evaluation       [x]  Low       []  Moderate       []  High 30 1   []  Modalities     [x]  Ther Exercise 10 1   []  Manual Therapy     []  Ther Activities     []  Aquatics     []  Vasocompression     []  Other       TOTAL TREATMENT TIME: 40    Time in: 0800      Time out: 3510    Electronically signed by: Maddison Chin PT    Physician Signature:________________________________Date:__________________  By signing above or cosigning this note, I have reviewed this plan of care and certify a need for medically necessary rehabilitation services.      *PLEASE SIGN ABOVE AND FAX BACK ALL PAGES*

## 2023-05-26 NOTE — CARE COORDINATION
Medicare Cap   [x] Physical Therapy  [] Speech Therapy  [] Occupational therapy  *PT and Speech caps combine      $2010 Cap limit < kx modifier needed < $9918 requires pre-cert        Patient Name: Alida Iglesias  YOB: 1970    Note:  This is an estimate of charges billed.                 Date of [address] Name # units/ charge $$$ charge Daily Total Charge Ongoing Total $$$   5-26-23 eval  therex 1  1   97.02  22.29 119.31 119.31

## 2023-05-30 ENCOUNTER — OFFICE VISIT (OUTPATIENT)
Dept: PODIATRY | Age: 53
End: 2023-05-30
Payer: MEDICARE

## 2023-05-30 VITALS — BODY MASS INDEX: 31.16 KG/M2 | WEIGHT: 187 LBS | HEIGHT: 65 IN

## 2023-05-30 DIAGNOSIS — D23.71 BENIGN NEOPLASM OF SKIN OF LOWER LIMB, INCLUDING HIP, RIGHT: ICD-10-CM

## 2023-05-30 DIAGNOSIS — E11.51 TYPE II DIABETES MELLITUS WITH PERIPHERAL CIRCULATORY DISORDER (HCC): Primary | ICD-10-CM

## 2023-05-30 DIAGNOSIS — D23.72 BENIGN NEOPLASM OF SKIN OF LOWER LIMB, INCLUDING HIP, LEFT: ICD-10-CM

## 2023-05-30 DIAGNOSIS — M79.604 BILATERAL LOWER EXTREMITY PAIN: ICD-10-CM

## 2023-05-30 DIAGNOSIS — I73.9 PERIPHERAL VASCULAR DISORDER (HCC): ICD-10-CM

## 2023-05-30 DIAGNOSIS — B35.1 DERMATOPHYTOSIS OF NAIL: ICD-10-CM

## 2023-05-30 DIAGNOSIS — R26.2 DIFFICULTY WALKING: ICD-10-CM

## 2023-05-30 DIAGNOSIS — M79.605 BILATERAL LOWER EXTREMITY PAIN: ICD-10-CM

## 2023-05-30 PROCEDURE — 3044F HG A1C LEVEL LT 7.0%: CPT | Performed by: PODIATRIST

## 2023-05-30 PROCEDURE — 2022F DILAT RTA XM EVC RTNOPTHY: CPT | Performed by: PODIATRIST

## 2023-05-30 PROCEDURE — G8417 CALC BMI ABV UP PARAM F/U: HCPCS | Performed by: PODIATRIST

## 2023-05-30 PROCEDURE — 3017F COLORECTAL CA SCREEN DOC REV: CPT | Performed by: PODIATRIST

## 2023-05-30 PROCEDURE — 1036F TOBACCO NON-USER: CPT | Performed by: PODIATRIST

## 2023-05-30 PROCEDURE — 17110 DESTRUCTION B9 LES UP TO 14: CPT | Performed by: PODIATRIST

## 2023-05-30 PROCEDURE — 99203 OFFICE O/P NEW LOW 30 MIN: CPT | Performed by: PODIATRIST

## 2023-05-30 PROCEDURE — G8427 DOCREV CUR MEDS BY ELIG CLIN: HCPCS | Performed by: PODIATRIST

## 2023-05-30 PROCEDURE — 11721 DEBRIDE NAIL 6 OR MORE: CPT | Performed by: PODIATRIST

## 2023-05-30 RX ORDER — MELOXICAM 7.5 MG/1
7.5 TABLET ORAL DAILY
Qty: 30 TABLET | Refills: 0 | COMMUNITY
Start: 2023-05-02 | End: 2023-06-01

## 2023-05-30 RX ORDER — METHYLPREDNISOLONE 4 MG/1
TABLET ORAL
COMMUNITY
Start: 2023-05-02

## 2023-05-31 ENCOUNTER — HOSPITAL ENCOUNTER (OUTPATIENT)
Dept: PHYSICAL THERAPY | Facility: CLINIC | Age: 53
Setting detail: THERAPIES SERIES
Discharge: HOME | End: 2023-05-31
Payer: MEDICARE

## 2023-05-31 NOTE — CARE COORDINATION
[] [address] [facility] &  Therapy  [address]    [phone]  F: [phone]   [] [address]  [facility]   [address]  P: [phone]  F: [phone]  [] [address] &  Therapy  [address]  P: [phone]  F: [phone] [] [address]  P: [phone]  F: [phone]  [x] [address]  [address] [facility] [address]   [address]   [place]: [phone]  F: [phone]   [] [address]  [place]: [phone]   F: [phone]     Physical Therapy Cancel/No Show note    Date: 2023  Patient: Jason Giraldo  : 1970  MRN: [id number]    Cancels/No Shows to date: 1    For today's appointment patient:    [x]  Cancelled    [] Rescheduled appointment    [] No-show     Reason given by patient:    []  Patient ill    []  Conflicting appointment    [] No transportation      [] Conflict with work    [] No reason given    [] Weather related    [] COVID-19    [x] Other:      Comments:  Staffing issues      [] Next appointment was confirmed    Electronically signed by: Rosa Alarcon PTA

## 2023-06-02 ENCOUNTER — HOSPITAL ENCOUNTER (OUTPATIENT)
Dept: PHYSICAL THERAPY | Facility: CLINIC | Age: 53
Setting detail: THERAPIES SERIES
Discharge: HOME | End: 2023-06-02
Payer: MEDICARE

## 2023-06-02 DIAGNOSIS — E08.42 DIABETIC POLYNEUROPATHY ASSOCIATED WITH DIABETES MELLITUS DUE TO UNDERLYING CONDITION (HCC): ICD-10-CM

## 2023-06-02 DIAGNOSIS — F41.9 ANXIETY: ICD-10-CM

## 2023-06-02 DIAGNOSIS — E78.5 DYSLIPIDEMIA: ICD-10-CM

## 2023-06-02 PROCEDURE — 97110 THERAPEUTIC EXERCISES: CPT

## 2023-06-02 NOTE — FLOWSHEET NOTE
THE [facility] &  Therapy  [facility]   [place]: [phone]  F: [phone]      Physical Therapy Daily Treatment Note    Date:  2023  Patient Name:  Dani Desai    :  1970  MRN: [id number]  Physician: Dr Jonathan Coyne: Medicare (medicare guidelines)  Medical Diagnosis: Neck Pain                     Rehab Codes: M54.2, M62.81 (Muscle Weakness), M62.9 (Disorder of Muscle), M79.1 (Myalgia)  Onset Date:                                Next 's appt. : -  Visit# / total visits: 2/10; Progress note for Medicare patient due at visit 10     Cancels/No Shows: 1      Subjective:    Pain:  [x] Yes  [] No Location: neck pain   Pain Rating: (0-10 scale) 10  Pain altered Tx:  [] No  [] Yes  Action:  Comments: Patient arrives with neck pain and tightness 5/10         Objective:    Precautions: She has had multiple falls in the past from balance and LE strength      Exercise    Neck pain  Reps/ Time Weight/ Level Comments             Bike                   Corner pec major Stretch   30\"x3       Doorway single arm pec minor stretch  30\"x3   HEP   Scapular Retractions  x20   HEP   Supine Chin Tucks x20       Cervical AROM  HEP   HEP   Upper Trap Stretch  30\"x3   HEP   Levator Scap Stretch  30\"x3   HEP   Seated chin tucks  x20                 Bands         Rows x20  green      Extension x20  green      Bicep x20  green      Tricep x20  green                                                     Specific Instructions for next treatment: therex, postural education          Treatment Charges: Mins Units   []  Modalities     [x]  Ther Exercise 33 2   []  Manual Therapy     []  Ther Activities     []  Neuro Re-ed     []  Vasocompression     [] Gait     []  Other     Total Treatment time 33 2       Assessment: [x] Progressing toward goals.  Reviewed HEP, discussed proper form with exercises given at HEP and additional exercises

## 2023-06-05 RX ORDER — BUSPIRONE HYDROCHLORIDE 30 MG/1
TABLET ORAL
Qty: 270 TABLET | Refills: 0 | Status: SHIPPED | OUTPATIENT
Start: 2023-06-05

## 2023-06-05 RX ORDER — GABAPENTIN 600 MG/1
TABLET ORAL
Qty: 180 TABLET | Refills: 0 | OUTPATIENT
Start: 2023-06-05 | End: 2023-08-27

## 2023-06-05 RX ORDER — BUSPIRONE HYDROCHLORIDE 30 MG/1
30 TABLET ORAL 3 TIMES DAILY
Qty: 270 TABLET | Refills: 0 | OUTPATIENT
Start: 2023-06-05

## 2023-06-05 RX ORDER — PRAVASTATIN SODIUM 80 MG/1
80 TABLET ORAL DAILY
Qty: 90 TABLET | Refills: 3 | Status: SHIPPED | OUTPATIENT
Start: 2023-06-05

## 2023-06-05 RX ORDER — GABAPENTIN 600 MG/1
TABLET ORAL
Qty: 180 TABLET | Refills: 0 | Status: SHIPPED | OUTPATIENT
Start: 2023-06-05 | End: 2023-09-03

## 2023-06-05 NOTE — TELEPHONE ENCOUNTER
Mayte Tucker is calling to request a refill on the following medication(s):    Medication Request:  Requested Prescriptions     Pending Prescriptions Disp Refills    pravastatin (PRAVACHOL) 80 MG tablet 90 tablet 3     Sig: Take 1 tablet by mouth daily       Last Visit Date (If Applicable):  0/13/7035    Next Visit Date:    8/21/2023

## 2023-06-05 NOTE — TELEPHONE ENCOUNTER
Pharmacy requesting refill of Gabapentin.       Medication active on med list yes      Date of last fill: 3/13/23  verified on 6/5/2023   verified by [facility] LPN      Date of last appointment 4/25/23    Next Visit Date:  7/3/23

## 2023-06-05 NOTE — TELEPHONE ENCOUNTER
Jim Lowry is calling to request a refill on the following medication(s):    Medication Request:  Requested Prescriptions     Pending Prescriptions Disp Refills    busPIRone (BUSPAR) 30 MG tablet [Pharmacy Med Name: BUSPIRONE 30MG TABLETS] 270 tablet 0     Sig: TAKE 1 TABLET BY MOUTH THREE TIMES DAILY       Last Visit Date (If Applicable):  5/44/3017    Next Visit Date:    6/2/2023

## 2023-06-06 ENCOUNTER — HOSPITAL ENCOUNTER (OUTPATIENT)
Dept: PHYSICAL THERAPY | Facility: CLINIC | Age: 53
Setting detail: THERAPIES SERIES
Discharge: HOME | End: 2023-06-06
Payer: MEDICARE

## 2023-06-06 NOTE — CARE COORDINATION
[] [facility]) [facility] &  Therapy  [address]    [phone]  F: [phone]   [] [address]  [facility] [address]   [address]  P: [phone]  F: [phone]  [] [address] &  Therapy  [address]  P: [phone]  F: [phone] [] [address]  P: [phone]  F: [phone]  [x] [address]  [address] [address] [facility] [address]   [address]   [place]: [phone]  F: [phone]   [] [facility]  [address]  [place]: [phone]   F: [phone]     Physical Therapy Cancel/No Show note    Date: 2023  Patient: Chryl Stable  : 1970  MRN: [id number]    Cancels/No Shows to date: 20    For today's appointment patient:    [x]  Cancelled    [] Rescheduled appointment    [] No-show     Reason given by patient:    []  Patient ill    [x]  Conflicting appointment    [] No transportation      [] Conflict with work    [] No reason given    [] Weather related    [] [id number]    [] Other:      Comments:        [x] Next appointment was confirmed    Electronically signed by: Orion Rush

## 2023-06-09 ENCOUNTER — HOSPITAL ENCOUNTER (OUTPATIENT)
Dept: PHYSICAL THERAPY | Facility: CLINIC | Age: 53
Setting detail: THERAPIES SERIES
Discharge: HOME | End: 2023-06-09
Payer: MEDICARE

## 2023-06-09 PROCEDURE — 97110 THERAPEUTIC EXERCISES: CPT

## 2023-06-09 PROCEDURE — 97140 MANUAL THERAPY 1/> REGIONS: CPT

## 2023-06-09 NOTE — FLOWSHEET NOTE
[facility]  The[facility]  [facility]   [place] Mathewn: [phone]  F: [phone]      Physical Therapy Daily Treatment Note    Date:  2023  Patient Name:  Sharon Scherer    :  1970  MRN: [id number]  Physician: Dr Elida Goldstein: Medicare (medicare guidelines)  Medical Diagnosis: Neck Pain                     Rehab Codes: M54.2, M62.81 (Muscle Weakness), M62.9 (Disorder of Muscle), M79.1 (Myalgia)  Onset Date:                                Next 's appt. : -  Visit# / total visits: 3/10; Progress note for Medicare patient due at visit 10     Cancels/No Shows: 2/0      Subjective:    Pain:  [x] Yes  [] No Location: neck pain   Pain Rating: (0-10 scale) 3/10  Pain altered Tx:  [x] No  [] Yes  Action:  Comments: Patient arrives stating mild neck pain today, is having increased LBP. R side of neck is worse than the L side. Patient plans to get an injection for her neck, awaiting a date to be scheduled.         Objective:    Precautions: She has had multiple falls in the past from balance and LE strength      Exercise    Neck pain  Reps/ Time Weight/ Level Comments             Bike  5'                 Corner pec major Stretch   30\"x3       Doorway single arm pec minor stretch HEP      Scapular Retractions  HEP      Supine Chin Tucks x20       Cervical AROM  HEP   HEP   Upper Trap Stretch  30\"x3   HEP   Levator Scap Stretch  30\"x3   HEP   Seated chin tucks                   Bands         Rows x20  green      Extension x20  green      Bicep x20  green      Tricep x20  green      B. ER x20  green                                      MFR via DI R upper trap/scalene-supine   Occipital release   Gentle cervical distraction      Specific Instructions for next treatment: therex, postural education          Treatment Charges: Mins Units   []  Modalities     [x]  Ther Exercise 20 1   [x]  Manual Therapy 10 1   []  Ther No

## 2023-06-16 ENCOUNTER — HOSPITAL ENCOUNTER (OUTPATIENT)
Dept: PHYSICAL THERAPY | Facility: CLINIC | Age: 53
Setting detail: THERAPIES SERIES
Discharge: HOME | End: 2023-06-16
Payer: MEDICARE

## 2023-07-05 ENCOUNTER — OFFICE VISIT (OUTPATIENT)
Dept: DERMATOLOGY | Age: 53
End: 2023-07-05

## 2023-07-05 VITALS
SYSTOLIC BLOOD PRESSURE: 96 MMHG | RESPIRATION RATE: 100 BRPM | DIASTOLIC BLOOD PRESSURE: 65 MMHG | TEMPERATURE: 97.4 F | HEART RATE: 100 BPM | OXYGEN SATURATION: 98 %

## 2023-07-05 DIAGNOSIS — L82.1 SEBORRHEIC KERATOSES: ICD-10-CM

## 2023-07-05 DIAGNOSIS — D18.01 CHERRY ANGIOMA: ICD-10-CM

## 2023-07-05 DIAGNOSIS — D48.9 NEOPLASM OF UNCERTAIN BEHAVIOR: Primary | ICD-10-CM

## 2023-07-05 RX ORDER — LIDOCAINE HYDROCHLORIDE AND EPINEPHRINE 10; 10 MG/ML; UG/ML
0.5 INJECTION, SOLUTION INFILTRATION; PERINEURAL ONCE
Status: SHIPPED | OUTPATIENT
Start: 2023-07-05

## 2023-07-05 NOTE — PROGRESS NOTES
obtained from the patient or the parent. After cleaning with alcohol the lesion was anesthetized with 1% lidocaine with epinephrine and was removed with a dermablade. Hemostasis was achieved with aluminum chloride and Vaseline and a bandage were applied.   - lidocaine-EPINEPHrine 1 %-1:[id number] injection 0.5 mL  - Surgical Pathology; Future  - NH TANGENTIAL BIOPSY SKIN SINGLE LESION    2. Cherry angioma  - reassurance and education     3. Seborrheic keratoses  - reassurance and education       RTC per path    Future Appointments   Date Time Provider [address]   7/8/2023  7:30 AM STC DIGITAL RM ST[facility] Radiolog   8/21/2023  3:00 PM MONCHO Rogers - CNP [facility]   11/9/2023  9:45 AM Magdalena Moses MD [facility]         There are no Patient Instructions on file for this visit.       Electronically signed by Elizabeth Cherry PA-C on 7/5/23 at 4:39 PM EDT

## 2023-07-06 ENCOUNTER — NURSE ONLY (OUTPATIENT)
Dept: LAB | Age: 53
End: 2023-07-06

## 2023-07-08 ENCOUNTER — HOSPITAL ENCOUNTER (OUTPATIENT)
Dept: WOMENS IMAGING | Age: 53
End: 2023-07-08
Payer: MEDICARE

## 2023-07-08 DIAGNOSIS — Z12.31 VISIT FOR SCREENING MAMMOGRAM: ICD-10-CM

## 2023-07-08 PROCEDURE — 77063 BREAST TOMOSYNTHESIS BI: CPT

## 2023-07-11 DIAGNOSIS — E11.65 TYPE 2 DIABETES MELLITUS WITH HYPERGLYCEMIA, WITH LONG-TERM CURRENT USE OF INSULIN (HCC): ICD-10-CM

## 2023-07-11 DIAGNOSIS — Z79.4 TYPE 2 DIABETES MELLITUS WITH HYPERGLYCEMIA, WITH LONG-TERM CURRENT USE OF INSULIN (HCC): ICD-10-CM

## 2023-07-11 NOTE — RESULT ENCOUNTER NOTE
We have received and reviewed your biopsy results, which demonstrated a benign skin lesion called an irritated seborrheic keratosis. No further Tx is required for this lesion. F/U as scheduled.

## 2023-07-12 RX ORDER — SEMAGLUTIDE 2.68 MG/ML
INJECTION, SOLUTION SUBCUTANEOUS
Qty: 9 ML | Refills: 2 | Status: SHIPPED | OUTPATIENT
Start: 2023-07-12

## 2023-07-12 NOTE — TELEPHONE ENCOUNTER
Adenike Liao is calling to request a refill on the following medication(s):    Medication Request:  Requested Prescriptions     Pending Prescriptions Disp Refills    Semaglutide, 2 MG/DOSE, (OZEMPIC, 2 MG/DOSE,) 8 MG/3ML SOPN 9 mL 2     Sig: Inject 2 mg weekly       Last Visit Date (If Applicable):  9/61/1230    Next Visit Date:    8/21/2023

## 2023-07-19 DIAGNOSIS — Z76.0 MEDICATION REFILL: ICD-10-CM

## 2023-07-20 RX ORDER — DICYCLOMINE HCL 20 MG
20 TABLET ORAL 4 TIMES DAILY
Qty: 120 TABLET | Refills: 1 | Status: SHIPPED | OUTPATIENT
Start: 2023-07-20

## 2023-07-20 NOTE — TELEPHONE ENCOUNTER
Matthew Rueda is calling to request a refill on the following medication(s):    Medication Request:  Requested Prescriptions     Pending Prescriptions Disp Refills    dicyclomine (BENTYL) 20 MG tablet 120 tablet 0     Sig: Take 1 tablet by mouth 4 times daily       Last Visit Date (If Applicable):  1/53/31    Next Visit Date:    8/21/23

## 2023-08-13 DIAGNOSIS — Z79.4 TYPE 2 DIABETES MELLITUS WITH HYPERGLYCEMIA, WITH LONG-TERM CURRENT USE OF INSULIN (HCC): ICD-10-CM

## 2023-08-13 DIAGNOSIS — E11.65 TYPE 2 DIABETES MELLITUS WITH HYPERGLYCEMIA, WITH LONG-TERM CURRENT USE OF INSULIN (HCC): ICD-10-CM

## 2023-08-14 RX ORDER — INSULIN GLARGINE 100 [IU]/ML
INJECTION, SOLUTION SUBCUTANEOUS
Qty: 15 ML | Refills: 3 | Status: SHIPPED | OUTPATIENT
Start: 2023-08-14

## 2023-08-14 NOTE — TELEPHONE ENCOUNTER
Cesario Watts is calling to request a refill on the following medication(s):    Medication Request:  Requested Prescriptions     Pending Prescriptions Disp Refills    LANTUS SOLOSTAR 100 UNIT/ML injection pen [Pharmacy Med Name: LANTUS SOLOSTAR PEN INJ 3ML] 15 mL 3     Sig: ADMINISTER 30 UNITS UNDER THE SKIN EVERY NIGHT       Last Visit Date (If Applicable):  9/24/7543    Next Visit Date:    8/21/2023

## 2023-10-22 DIAGNOSIS — F41.9 ANXIETY: ICD-10-CM

## 2023-10-23 RX ORDER — BUSPIRONE HYDROCHLORIDE 30 MG/1
TABLET ORAL
Qty: 270 TABLET | Refills: 0 | Status: SHIPPED | OUTPATIENT
Start: 2023-10-23

## 2023-10-23 NOTE — TELEPHONE ENCOUNTER
Charlie Zazueta is calling to request a refill on the following medication(s):    Medication Request:  Requested Prescriptions     Pending Prescriptions Disp Refills    busPIRone (BUSPAR) 30 MG tablet [Pharmacy Med Name: BUSPIRONE 30MG TABLETS] 270 tablet 0     Sig: TAKE 1 TABLET BY MOUTH THREE TIMES DAILY       Last Visit Date (If Applicable):  9/58/9931    Next Visit Date:    Visit date not found

## 2023-11-23 DIAGNOSIS — Z76.0 MEDICATION REFILL: ICD-10-CM

## 2023-11-24 RX ORDER — DICYCLOMINE HCL 20 MG
20 TABLET ORAL 4 TIMES DAILY
Qty: 120 TABLET | Refills: 1 | Status: SHIPPED | OUTPATIENT
Start: 2023-11-24

## 2023-11-24 NOTE — TELEPHONE ENCOUNTER
Marcus Ragland is calling to request a refill on the following medication(s):    Medication Request:  Requested Prescriptions     Pending Prescriptions Disp Refills    dicyclomine (BENTYL) 20 MG tablet [Pharmacy Med Name: DICYCLOMINE 20MG TABLETS] 120 tablet 1     Sig: TAKE 1 TABLET BY MOUTH FOUR TIMES DAILY       Last Visit Date (If Applicable):  6/79/2381    Next Visit Date:    Visit date not found

## 2024-01-07 DIAGNOSIS — K21.9 GASTROESOPHAGEAL REFLUX DISEASE WITHOUT ESOPHAGITIS: ICD-10-CM

## 2024-01-07 DIAGNOSIS — E11.65 TYPE 2 DIABETES MELLITUS WITH HYPERGLYCEMIA, WITH LONG-TERM CURRENT USE OF INSULIN (HCC): ICD-10-CM

## 2024-01-07 DIAGNOSIS — Z79.4 TYPE 2 DIABETES MELLITUS WITH HYPERGLYCEMIA, WITH LONG-TERM CURRENT USE OF INSULIN (HCC): ICD-10-CM

## 2024-01-08 DIAGNOSIS — E11.65 TYPE 2 DIABETES MELLITUS WITH HYPERGLYCEMIA, WITH LONG-TERM CURRENT USE OF INSULIN (HCC): ICD-10-CM

## 2024-01-08 DIAGNOSIS — Z79.4 TYPE 2 DIABETES MELLITUS WITH HYPERGLYCEMIA, WITH LONG-TERM CURRENT USE OF INSULIN (HCC): ICD-10-CM

## 2024-01-08 RX ORDER — SEMAGLUTIDE 2.68 MG/ML
INJECTION, SOLUTION SUBCUTANEOUS
Qty: 9 ML | Refills: 2 | OUTPATIENT
Start: 2024-01-08

## 2024-01-08 RX ORDER — OMEPRAZOLE 20 MG/1
CAPSULE, DELAYED RELEASE ORAL
Qty: 180 CAPSULE | Refills: 1 | OUTPATIENT
Start: 2024-01-08

## 2024-01-08 RX ORDER — GLIMEPIRIDE 2 MG/1
TABLET ORAL
Qty: 90 TABLET | Refills: 1 | OUTPATIENT
Start: 2024-01-08

## 2024-01-08 NOTE — TELEPHONE ENCOUNTER
I called patient to schedule follow up visit and she has moved to [place], disregard refill request from pharmacy.

## 2024-01-08 NOTE — TELEPHONE ENCOUNTER
Mary Hollingsworth is calling to request a refill on the following medication(s):    Medication Request:  Requested Prescriptions     Pending Prescriptions Disp Refills    glimepiride (AMARYL) 2 MG tablet [Pharmacy Med Name: GLIMEPIRIDE 2MG TABLETS] 90 tablet 1     Sig: TAKE 1 TABLET BY MOUTH EVERY MORNING BEFORE BREAKFAST    omeprazole (PRILOSEC) 20 MG delayed release capsule [Pharmacy Med Name: OMEPRAZOLE 20MG CAPSULES] 180 capsule 1     Sig: TAKE 1 CAPSULE BY MOUTH TWICE DAILY BEFORE MEALS       Last Visit Date (If Applicable):  5/15/2023    Next Visit Date:    Visit date not found

## 2024-01-08 NOTE — TELEPHONE ENCOUNTER
Mary Hollingsworth is calling to request a refill on the following medication(s):    Medication Request:  Requested Prescriptions     Pending Prescriptions Disp Refills    semaglutide, 2 MG/DOSE, (OZEMPIC, 2 MG/DOSE,) 8 MG/3ML SOPN sc injection [Pharmacy Med Name: OZEMPIC 2MG PER DOSE (8MG/3ML) PFP] 9 mL 2     Sig: INJECT 2 MG SUBCUTANEOUS WEEKLY       Last Visit Date (If Applicable):  5/15/2023    Next Visit Date:    Visit date not found

## 2024-01-20 DIAGNOSIS — F41.9 ANXIETY: ICD-10-CM

## 2024-01-22 RX ORDER — BUSPIRONE HYDROCHLORIDE 30 MG/1
TABLET ORAL
Qty: 270 TABLET | Refills: 0 | OUTPATIENT
Start: 2024-01-22

## 2024-01-22 NOTE — TELEPHONE ENCOUNTER
Mary Hollingsworth is calling to request a refill on the following medication(s):    Medication Request:  Requested Prescriptions     Pending Prescriptions Disp Refills    busPIRone (BUSPAR) 30 MG tablet [Pharmacy Med Name: BUSPIRONE 30MG TABLETS] 270 tablet 0     Sig: TAKE 1 TABLET BY MOUTH THREE TIMES DAILY       Last Visit Date (If Applicable):  5/15/2023    Next Visit Date:    Visit date not found

## 2024-03-08 ENCOUNTER — TELEPHONE (OUTPATIENT)
Dept: FAMILY MEDICINE CLINIC | Age: 54
End: 2024-03-08

## 2024-06-20 DIAGNOSIS — Z76.0 MEDICATION REFILL: ICD-10-CM

## 2024-06-20 RX ORDER — DICYCLOMINE HCL 20 MG
20 TABLET ORAL 4 TIMES DAILY
Qty: 120 TABLET | Refills: 1 | OUTPATIENT
Start: 2024-06-20

## 2025-02-05 NOTE — PATIENT INSTRUCTIONS
February 5, 2025      [facility]  [address] [facility] [address]  Phone: [phone]  Fax: [phone]       Patient: Yasmeen Quintanilla   YOB: 1957  Date of Visit: 02/05/2025    To Whom It May Concern:    MICHAEL Quintanilla  was at [facility] on 02/05/2025. The patient may return to work/school on 02/05/2025 with no restrictions. If you have any questions or concerns, or if I can be of further assistance, please do not hesitate to contact me.    Sincerely,    Branden Gibson MA      Learning About Obesity  What is obesity? Obesity means having an unhealthy amount of body fat. This puts your health in danger. It can lead to other health problems, such as type 2 diabetes and high blood pressure. How do you know if your weight is in the obesity range? To know if your weight is in the obesity range, your doctor looks at your body mass index (BMI) and waist size. BMI is a number that is calculated from your weight and your height. To figure out your BMI for yourself, you can use an online tool, such as http://www.dow.com/ on the [employer] of [employer]. If your BMI is 30.0 or higher, it falls within the obesity range. Keep in mind that BMI and waist size are only guides. They are not tools to determine your ideal body weight. What causes obesity? When you take in more calories than you burn off, you gain weight. How you eat, how active you are, and other things affect how your body uses calories and whether you gain weight. If you have family members who have too much body fat, you may have inherited a tendency to gain weight. And your family also helps form your eating and lifestyle habits, which can lead to obesity. Also, our busy lives make it harder to plan and cook healthy meals. For many of us, it's easier to reach for prepared foods, go out to eat, or go to the drive-through. But these foods are often high in saturated fat and calories. Portions are often too large. What can you do to reach a healthy weight? Focus on health, not diets. Diets are hard to stay on and don't work in the long run. It is very hard to stay with a diet that includes lots of big changes in your eating habits. Instead of a diet, focus on lifestyle changes that will improve your health and achieve the right balance of energy and calories. To lose weight, you need to burn more calories than you take in.  You can do it by eating healthy foods in reasonable amounts and becoming more active, even a little bit every day. Making small changes over time can add up to a lot. Make a plan for change. Many people have found that naming their reasons for change and staying focused on their plan can make a big difference. Work with your doctor to create a plan that is right for you. · Ask yourself: Alejo Elder are my personal, most powerful reasons for wanting this change? What will my life look like when I've made the change? \"  · Set your long-term goal. Make it specific, such as \"I will lose x pounds. \"  · Break your long-term goal into smaller, short-term goals. Make these small steps specific and within your reach, things you know you can do. These steps are what keep you going from day to day. Talk with your doctor about other weight-loss options. If you have a BMI in a certain range and have not been able to lose weight with diet and exercise, medicine or surgery may be an option for you. Before your doctor will prescribe medicines or surgery, he or she will probably want you to be more active and follow your healthy eating plan for a period of time. These habits are key lifelong changes for managing your weight, with or without other medical treatment. And these changes can help you avoid weight-related health problems. How can you stay on your plan for change? Be ready. Choose to start during a time when there are few events like holidays, social events, and high-stress periods. These events might trigger slip-ups. Decide on your first few steps. Most people have more success when they make small changes, one step at a time. For example, you might switch a daily candy bar to a piece of fruit, walk 10 minutes more, or add more vegetables to a meal.  Line up your support people. Make sure you're not going to be alone as you make this change. Connect with people who understand how important it is to you.  Ask family members and friends for help in keeping with your plan. And think about who could make it harder for you, and how to handle them. Try tracking. People who keep track of what they eat, feel, and do are better at losing weight. Try writing down things like:  · What and how much you eat. · How you feel before and after each meal.  · Details about each meal (like eating out or at home, eating alone, or with friends or family). · What you do to be active. Look and plan. As you track, look for patterns that you may want to change. Take note of:  · When you eat and whether you skip meals. · How often you eat out. · How many fruits and vegetables you eat. · When you eat beyond feeling full. · When and why you eat for reasons other than being hungry. When you stray from your plan, don't get upset. Figure out what made you slip up and how you can fix it. Can you take medicines or have surgery to lose weight? If you have a BMI in a certain range and have not been able to lose weight with diet and exercise, medicine or surgery may be an option for you. If you have a BMI of at least 30.0 (or a BMI of at least 27.0 and another health problem related to your weight), ask your doctor about weight-loss medicines. They work by making you feel less hungry, making you feel full more quickly, or changing how you digest fat. Medicines are used along with diet changes and more physical activity to help you make lasting changes. If you have a BMI of 40.0 or more (or a BMI of 35.0 or more and another health problem related to your weight), your doctor may talk with you about surgery. Weight-loss surgery has risks, and you will need to work with your doctor to compare the risk of having obesity with the risks of surgery. With any option you choose, you will still need to eat a healthy diet and get regular exercise. Follow-up care is a key part of your treatment and safety. Be sure to make and go to all appointments, and call your doctor if you are having problems. It's also a good idea to know your test results and keep a list of the medicines you take. Where can you learn more? Go to https://RotaPostpepiceweb.TheStreet. org and sign in to your [employer] account. Enter N111 in the [facility] box to learn more about \"Learning About Obesity. \"     If you do not have an account, please click on the \"Sign Up Now\" link. Current as of: September 23, 2020               Content Version: 12.8  © 2006-2021 [employer]. Care instructions adapted under license by [facility] ([facility]). If you have questions about a medical condition or this instruction, always ask your healthcare professional. [address] any warranty or liability for your use of this information.